# Patient Record
Sex: FEMALE | Race: WHITE | NOT HISPANIC OR LATINO | Employment: UNEMPLOYED | URBAN - METROPOLITAN AREA
[De-identification: names, ages, dates, MRNs, and addresses within clinical notes are randomized per-mention and may not be internally consistent; named-entity substitution may affect disease eponyms.]

---

## 2019-11-12 ENCOUNTER — HOSPITAL ENCOUNTER (OUTPATIENT)
Facility: MEDICAL CENTER | Age: 49
End: 2019-11-12
Attending: FAMILY MEDICINE
Payer: OTHER GOVERNMENT

## 2019-11-12 ENCOUNTER — OFFICE VISIT (OUTPATIENT)
Dept: MEDICAL GROUP | Facility: PHYSICIAN GROUP | Age: 49
End: 2019-11-12
Payer: OTHER GOVERNMENT

## 2019-11-12 VITALS
DIASTOLIC BLOOD PRESSURE: 82 MMHG | OXYGEN SATURATION: 97 % | RESPIRATION RATE: 18 BRPM | HEART RATE: 74 BPM | WEIGHT: 172 LBS | HEIGHT: 65 IN | SYSTOLIC BLOOD PRESSURE: 140 MMHG | TEMPERATURE: 97.8 F | BODY MASS INDEX: 28.66 KG/M2

## 2019-11-12 DIAGNOSIS — Z92.29 HISTORY OF OPIATE THERAPY: ICD-10-CM

## 2019-11-12 DIAGNOSIS — Z13.1 SCREENING FOR DIABETES MELLITUS: ICD-10-CM

## 2019-11-12 DIAGNOSIS — Z12.11 SCREENING FOR COLORECTAL CANCER: ICD-10-CM

## 2019-11-12 DIAGNOSIS — G44.219 EPISODIC TENSION-TYPE HEADACHE, NOT INTRACTABLE: ICD-10-CM

## 2019-11-12 DIAGNOSIS — C43.9 MALIGNANT MELANOMA, UNSPECIFIED SITE (HCC): ICD-10-CM

## 2019-11-12 DIAGNOSIS — Z12.31 ENCOUNTER FOR SCREENING MAMMOGRAM FOR BREAST CANCER: ICD-10-CM

## 2019-11-12 DIAGNOSIS — E78.49 OTHER HYPERLIPIDEMIA: ICD-10-CM

## 2019-11-12 DIAGNOSIS — E04.1 THYROID NODULE: ICD-10-CM

## 2019-11-12 DIAGNOSIS — I10 ESSENTIAL HYPERTENSION: ICD-10-CM

## 2019-11-12 DIAGNOSIS — Z12.12 SCREENING FOR COLORECTAL CANCER: ICD-10-CM

## 2019-11-12 PROBLEM — G44.209 TENSION TYPE HEADACHE: Status: ACTIVE | Noted: 2019-11-12

## 2019-11-12 PROCEDURE — 82274 ASSAY TEST FOR BLOOD FECAL: CPT

## 2019-11-12 PROCEDURE — 99203 OFFICE O/P NEW LOW 30 MIN: CPT | Performed by: FAMILY MEDICINE

## 2019-11-12 RX ORDER — LOSARTAN POTASSIUM 25 MG/1
25 TABLET ORAL DAILY
COMMUNITY
End: 2019-11-12

## 2019-11-12 RX ORDER — EZETIMIBE 10 MG/1
10 TABLET ORAL DAILY
COMMUNITY
End: 2019-11-12

## 2019-11-12 RX ORDER — AMITRIPTYLINE HYDROCHLORIDE 50 MG/1
50 TABLET, FILM COATED ORAL NIGHTLY
COMMUNITY
End: 2019-11-12 | Stop reason: SDUPTHER

## 2019-11-12 RX ORDER — BUTALBITAL, ACETAMINOPHEN AND CAFFEINE 50; 325; 40 MG/1; MG/1; MG/1
1 TABLET ORAL EVERY 4 HOURS PRN
Qty: 30 TAB | Refills: 0 | Status: SHIPPED | OUTPATIENT
Start: 2019-11-12 | End: 2019-12-12

## 2019-11-12 RX ORDER — AMITRIPTYLINE HYDROCHLORIDE 50 MG/1
50 TABLET, FILM COATED ORAL EVERY EVENING
Qty: 90 TAB | Refills: 3 | Status: SHIPPED | OUTPATIENT
Start: 2019-11-12 | End: 2020-12-14 | Stop reason: SDUPTHER

## 2019-11-12 RX ORDER — LOSARTAN POTASSIUM 50 MG/1
50 TABLET ORAL DAILY
Qty: 90 TAB | Refills: 3 | Status: SHIPPED | OUTPATIENT
Start: 2019-11-12 | End: 2020-06-15

## 2019-11-12 RX ORDER — PROPRANOLOL HYDROCHLORIDE 120 MG/1
120 CAPSULE, EXTENDED RELEASE ORAL DAILY
COMMUNITY
End: 2019-11-12 | Stop reason: SDUPTHER

## 2019-11-12 RX ORDER — BUTALBITAL, ACETAMINOPHEN AND CAFFEINE 50; 325; 40 MG/1; MG/1; MG/1
1 TABLET ORAL EVERY 4 HOURS PRN
COMMUNITY
End: 2019-11-12 | Stop reason: SDUPTHER

## 2019-11-12 RX ORDER — PROPRANOLOL HYDROCHLORIDE 120 MG/1
120 CAPSULE, EXTENDED RELEASE ORAL DAILY
Qty: 90 CAP | Refills: 3 | Status: SHIPPED | OUTPATIENT
Start: 2019-11-12 | End: 2020-11-30 | Stop reason: SDUPTHER

## 2019-11-12 RX ORDER — ATORVASTATIN CALCIUM 10 MG/1
10 TABLET, FILM COATED ORAL DAILY
Qty: 90 TAB | Refills: 3 | Status: SHIPPED | OUTPATIENT
Start: 2019-11-12 | End: 2020-06-15

## 2019-11-12 SDOH — HEALTH STABILITY: MENTAL HEALTH: HOW OFTEN DO YOU HAVE A DRINK CONTAINING ALCOHOL?: MONTHLY OR LESS

## 2019-11-12 ASSESSMENT — PATIENT HEALTH QUESTIONNAIRE - PHQ9: CLINICAL INTERPRETATION OF PHQ2 SCORE: 0

## 2019-11-12 NOTE — PROGRESS NOTES
cc: Medication refills      Subjective:     Asher Cortez is a 49 y.o. female presenting for the following:     In April 2018 patient did have a changing skin lesion that was removed and found to be melanoma.  This was on her upper back.  She treated this with excision only and it was recommended that she have a wide excision but she declined this.  She declines any further referrals currently.    Severe tension-type headaches: Chronic recurrent headaches that she was a teenager.  She was previously just on Fioricet as needed but was taking this too regularly.  She was then started on propranolol and amitriptyline at night and this was very helpful.  She now only needs Fioricet a few times a month.  She is gone through about 25 tablets in the last 5 months.     Patient was previously on regular twice daily Norco.  She did stop this medication and did have some withdrawal effects.  Takes kratom semi-regularly, she did start this for the withdrawal effects from opiates.    Patient does have a diagnosis of hyperlipidemia.  She claims her total number was higher than 300 on the last check.  She has been on a statin in the past and does not remember why she was switched to Zetia but she denies having any side effects with simvastatin.    Review of systems:  All others reviewed and are negative.       Current Outpatient Medications:   •  atorvastatin (LIPITOR) 10 MG Tab, Take 1 Tab by mouth every day., Disp: 90 Tab, Rfl: 3  •  amitriptyline (ELAVIL) 50 MG Tab, Take 1 Tab by mouth every evening., Disp: 90 Tab, Rfl: 3  •  losartan (COZAAR) 50 MG Tab, Take 1 Tab by mouth every day., Disp: 90 Tab, Rfl: 3  •  propranolol CR (INDERAL LA) 120 MG CAPSULE SR 24 HR, Take 1 Cap by mouth every day., Disp: 90 Cap, Rfl: 3  •  acetaminophen/caffeine/butalbital 325-40-50 mg (FIORICET) -40 MG Tab, Take 1 Tab by mouth every four hours as needed for Headache for up to 30 days., Disp: 30 Tab, Rfl: 0    Allergies, past medical  "history, past surgical history, family history, social history reviewed and updated    Objective:     Vitals: /82 (BP Location: Left arm, Patient Position: Sitting, BP Cuff Size: Adult)   Pulse 74   Temp 36.6 °C (97.8 °F) (Temporal)   Resp 18   Ht 1.651 m (5' 5\")   Wt 78 kg (172 lb)   SpO2 97%   BMI 28.62 kg/m²   General: Alert, pleasant, NAD  HEENT: Normocephalic.   EOMI, no icterus or pallor.  Conjunctivae and lids normal. External ears normal. Oropharynx non-erythematous, mucous membranes moist.    Neck supple.  No thyromegaly or masses palpated. No cervical or supraclavicular lymphadenopathy.  Heart: Regular rate and rhythm.  S1 and S2 normal.  No murmurs appreciated.  Respiratory: Normal respiratory effort.  Clear to auscultation bilaterally.  Abdomen: Non-distended, soft  Skin: Warm, dry, no rashes.  Well-healed scar roughly 2 inches long on back.  Musculoskeletal: Gait is normal.  Moves all extremities well.  Extremities: No leg edema.    Neurological: No tremors, sensation grossly intact,  tone/strength normal, gait is normal, CN2-12 grossly intact  Psych:  Affect is normal, judgement is good, memory is intact, grooming is appropriate.    Assessment/Plan:     Asher was seen today for establish care and medication refill.    Diagnoses and all orders for this visit:    Malignant melanoma, unspecified site (HCC): Per patient report a wide excision and lymph node biopsy was recommended to her in April 2019 but she declined this and she declines any referral currently.    Episodic tension-type headache, not intractable: Patient taking regular amitriptyline and propranolol for prophylaxis and Fioricet only as needed.  Averaging 4-5 tablets/month.  She is aware that this is an addictive drug and not to take this with alcohol.  -     acetaminophen/caffeine/butalbital 325-40-50 mg (FIORICET) -40 MG Tab; Take 1 Tab by mouth every four hours as needed for Headache for up to 30 days.    Other " hyperlipidemia: Very elevated in the past.  Will stop Zetia and restart a statin, as patient denied having any side effects with them in the past.  -     Lipid Profile; Future    Essential hypertension well-controlled chronic problem with losartan.  -     Comp Metabolic Panel; Future    Screening for diabetes mellitus  -     HEMOGLOBIN A1C; Future    Encounter for screening mammogram for breast cancer: Denies breast pain or masses, breast skin changes, or nipple discharge.   -     MA-SCREENING MAMMO BILAT W/TOMOSYNTHESIS W/CAD; Future    Screening for colorectal cancer  -     OCCULT BLOOD FECES IMMUNOASSAY (FIT); Future    History of opiate therapy: Previously on regular Norco did stop this.    Thyroid nodule: Per patient report she does have a thyroid nodule found incidentally on MRI.  This has not been monitored in more than a year.  Will check now.  -     US-SOFT TISSUES OF HEAD - NECK; Future    Other orders  -     atorvastatin (LIPITOR) 10 MG Tab; Take 1 Tab by mouth every day.  -     amitriptyline (ELAVIL) 50 MG Tab; Take 1 Tab by mouth every evening.  -     losartan (COZAAR) 50 MG Tab; Take 1 Tab by mouth every day.  -     propranolol CR (INDERAL LA) 120 MG CAPSULE SR 24 HR; Take 1 Cap by mouth every day.        Return in about 6 months (around 5/12/2020), or if symptoms worsen or fail to improve.

## 2019-11-14 DIAGNOSIS — Z12.12 SCREENING FOR COLORECTAL CANCER: ICD-10-CM

## 2019-11-14 DIAGNOSIS — Z12.11 SCREENING FOR COLORECTAL CANCER: ICD-10-CM

## 2019-11-14 LAB — HEMOCCULT STL QL IA: NEGATIVE

## 2019-12-10 ENCOUNTER — HOSPITAL ENCOUNTER (OUTPATIENT)
Dept: RADIOLOGY | Facility: MEDICAL CENTER | Age: 49
End: 2019-12-10
Attending: FAMILY MEDICINE
Payer: OTHER GOVERNMENT

## 2019-12-10 DIAGNOSIS — E04.1 THYROID NODULE: ICD-10-CM

## 2019-12-10 DIAGNOSIS — Z12.31 ENCOUNTER FOR SCREENING MAMMOGRAM FOR BREAST CANCER: ICD-10-CM

## 2019-12-10 PROCEDURE — 76536 US EXAM OF HEAD AND NECK: CPT

## 2019-12-10 PROCEDURE — 77067 SCR MAMMO BI INCL CAD: CPT

## 2019-12-12 ENCOUNTER — TELEPHONE (OUTPATIENT)
Dept: RADIOLOGY | Facility: MEDICAL CENTER | Age: 49
End: 2019-12-12

## 2020-06-10 ENCOUNTER — HOSPITAL ENCOUNTER (OUTPATIENT)
Dept: LAB | Facility: MEDICAL CENTER | Age: 50
End: 2020-06-10
Attending: FAMILY MEDICINE
Payer: OTHER GOVERNMENT

## 2020-06-10 DIAGNOSIS — I10 ESSENTIAL HYPERTENSION: ICD-10-CM

## 2020-06-10 DIAGNOSIS — E78.49 OTHER HYPERLIPIDEMIA: ICD-10-CM

## 2020-06-10 DIAGNOSIS — Z13.1 SCREENING FOR DIABETES MELLITUS: ICD-10-CM

## 2020-06-10 PROCEDURE — 80053 COMPREHEN METABOLIC PANEL: CPT

## 2020-06-10 PROCEDURE — 83036 HEMOGLOBIN GLYCOSYLATED A1C: CPT

## 2020-06-10 PROCEDURE — 80061 LIPID PANEL: CPT

## 2020-06-10 PROCEDURE — 36415 COLL VENOUS BLD VENIPUNCTURE: CPT

## 2020-06-11 LAB
ALBUMIN SERPL BCP-MCNC: 4.4 G/DL (ref 3.2–4.9)
ALBUMIN/GLOB SERPL: 1.6 G/DL
ALP SERPL-CCNC: 119 U/L (ref 30–99)
ALT SERPL-CCNC: 12 U/L (ref 2–50)
ANION GAP SERPL CALC-SCNC: 8 MMOL/L (ref 7–16)
AST SERPL-CCNC: 16 U/L (ref 12–45)
BILIRUB SERPL-MCNC: 0.3 MG/DL (ref 0.1–1.5)
BUN SERPL-MCNC: 12 MG/DL (ref 8–22)
CALCIUM SERPL-MCNC: 9.3 MG/DL (ref 8.5–10.5)
CHLORIDE SERPL-SCNC: 106 MMOL/L (ref 96–112)
CHOLEST SERPL-MCNC: 384 MG/DL (ref 100–199)
CO2 SERPL-SCNC: 23 MMOL/L (ref 20–33)
CREAT SERPL-MCNC: 0.81 MG/DL (ref 0.5–1.4)
EST. AVERAGE GLUCOSE BLD GHB EST-MCNC: 114 MG/DL
GLOBULIN SER CALC-MCNC: 2.7 G/DL (ref 1.9–3.5)
GLUCOSE SERPL-MCNC: 76 MG/DL (ref 65–99)
HBA1C MFR BLD: 5.6 % (ref 0–5.6)
HDLC SERPL-MCNC: 58 MG/DL
LDLC SERPL CALC-MCNC: 298 MG/DL
POTASSIUM SERPL-SCNC: 4.6 MMOL/L (ref 3.6–5.5)
PROT SERPL-MCNC: 7.1 G/DL (ref 6–8.2)
SODIUM SERPL-SCNC: 137 MMOL/L (ref 135–145)
TRIGL SERPL-MCNC: 140 MG/DL (ref 0–149)

## 2020-06-15 ENCOUNTER — OFFICE VISIT (OUTPATIENT)
Dept: MEDICAL GROUP | Facility: PHYSICIAN GROUP | Age: 50
End: 2020-06-15
Payer: OTHER GOVERNMENT

## 2020-06-15 VITALS
HEART RATE: 64 BPM | BODY MASS INDEX: 28.66 KG/M2 | OXYGEN SATURATION: 94 % | WEIGHT: 172 LBS | DIASTOLIC BLOOD PRESSURE: 92 MMHG | HEIGHT: 65 IN | TEMPERATURE: 97.5 F | SYSTOLIC BLOOD PRESSURE: 146 MMHG

## 2020-06-15 DIAGNOSIS — E78.01 FAMILIAL HYPERCHOLESTEREMIA: ICD-10-CM

## 2020-06-15 DIAGNOSIS — N95.1 MENOPAUSAL AND FEMALE CLIMACTERIC STATES: ICD-10-CM

## 2020-06-15 DIAGNOSIS — F41.9 ANXIETY: ICD-10-CM

## 2020-06-15 DIAGNOSIS — G44.219 EPISODIC TENSION-TYPE HEADACHE, NOT INTRACTABLE: ICD-10-CM

## 2020-06-15 DIAGNOSIS — I10 ESSENTIAL HYPERTENSION: ICD-10-CM

## 2020-06-15 PROCEDURE — 99214 OFFICE O/P EST MOD 30 MIN: CPT | Performed by: FAMILY MEDICINE

## 2020-06-15 RX ORDER — ATORVASTATIN CALCIUM 40 MG/1
40 TABLET, FILM COATED ORAL DAILY
Qty: 90 TAB | Refills: 1 | Status: SHIPPED | OUTPATIENT
Start: 2020-06-15 | End: 2021-03-02

## 2020-06-15 RX ORDER — PROPRANOLOL HCL 60 MG
60 CAPSULE, EXTENDED RELEASE 24HR ORAL EVERY EVENING
Qty: 90 CAP | Refills: 2 | Status: SHIPPED | OUTPATIENT
Start: 2020-06-15 | End: 2020-12-14 | Stop reason: SDUPTHER

## 2020-06-15 RX ORDER — BUTALBITAL, ACETAMINOPHEN AND CAFFEINE 50; 325; 40 MG/1; MG/1; MG/1
1 TABLET ORAL EVERY 4 HOURS PRN
Qty: 30 TAB | Refills: 0 | Status: SHIPPED | OUTPATIENT
Start: 2020-06-15 | End: 2021-05-07 | Stop reason: SDUPTHER

## 2020-06-15 RX ORDER — LOSARTAN POTASSIUM 25 MG/1
25 TABLET ORAL DAILY
Qty: 90 TAB | Refills: 3 | Status: SHIPPED | OUTPATIENT
Start: 2020-06-15 | End: 2020-12-14

## 2020-06-15 RX ORDER — ESTRADIOL 0.1 MG/G
CREAM VAGINAL
Qty: 1 TUBE | Refills: 2 | Status: SHIPPED | OUTPATIENT
Start: 2020-06-15 | End: 2021-05-24

## 2020-06-15 ASSESSMENT — PATIENT HEALTH QUESTIONNAIRE - PHQ9
CLINICAL INTERPRETATION OF PHQ2 SCORE: 5
SUM OF ALL RESPONSES TO PHQ QUESTIONS 1-9: 14
5. POOR APPETITE OR OVEREATING: 3 - NEARLY EVERY DAY

## 2020-06-15 NOTE — PROGRESS NOTES
cc: anxiety       Subjective:     Asher Galeas is a 49 y.o. female presenting for the following:     Anxiety: patient has now been taking propranolol in the morning and this has helped her anxiety quite a bit. Also, she has stopped getting on social media and this has helped dramatically.  She does have some worry but she is now sleeping well and is able to concentrate make decisions.    Hypertension: Patient ran out of the losartan and admits she was not very good at taking it regularly.  However, she did not have side effect when she was taking this.Patient denies any chest pain, shortness of breath, palpitations, swelling, or lightheadedness.    Hyperlipidemia- patient did indeed have elevated cholesterol with an LDL of 298.  She did take her atorvastatin 10 mg without side effect.  She believes she has been on a higher dose of this in the past without side effect.  She does have a brother who has had a bypass graft in his 50s and her father did have a heart attack at a young age.    Review of systems:  All others reviewed and are negative.       Current Outpatient Medications:   •  propranolol LA (INDERAL LA) 60 MG CAPSULE SR 24 HR, Take 1 Cap by mouth every evening., Disp: 90 Cap, Rfl: 2  •  losartan (COZAAR) 25 MG Tab, Take 1 Tab by mouth every day., Disp: 90 Tab, Rfl: 3  •  atorvastatin (LIPITOR) 40 MG Tab, Take 1 Tab by mouth every day., Disp: 90 Tab, Rfl: 1  •  acetaminophen/caffeine/butalbital 325-40-50 mg (FIORICET) -40 MG Tab, Take 1 Tab by mouth every four hours as needed for Headache for up to 30 days., Disp: 30 Tab, Rfl: 0  •  estradiol (ESTRACE) 0.1 MG/GM vaginal cream, 1 g VAGINALLY daily for 2 weeks, then use 1 g 3 times a week for 3 months, Disp: 1 Tube, Rfl: 2  •  aspirin 81 MG tablet, Take 81 mg by mouth every day., Disp: , Rfl:   •  amitriptyline (ELAVIL) 50 MG Tab, Take 1 Tab by mouth every evening., Disp: 90 Tab, Rfl: 3  •  propranolol CR (INDERAL LA) 120 MG CAPSULE  "SR 24 HR, Take 1 Cap by mouth every day., Disp: 90 Cap, Rfl: 3    Allergies, past medical history, past surgical history, family history, social history reviewed and updated    Objective:     Vitals: /92 (BP Location: Left arm, Patient Position: Sitting, BP Cuff Size: Large adult)   Pulse 64   Temp 36.4 °C (97.5 °F)   Ht 1.651 m (5' 5\")   Wt 78 kg (172 lb)   SpO2 94%   BMI 28.62 kg/m²   General: Alert, pleasant, NAD  HEENT: Normocephalic.   EOMI, no icterus or pallor.    Heart: Regular rate and rhythm.  S1 and S2 normal.  No murmurs appreciated.  Respiratory: Normal respiratory effort.  Clear to auscultation bilaterally.   Neurological: sensation grossly intact,  tone/strength normal, gait is normal, CN2-12 grossly intact  Psych:  Affect is normal, judgement is good, grooming is appropriate.    Assessment/Plan:     Asher was seen today for menopause, anxiety, hypertension and migraine.    Diagnoses and all orders for this visit:    Patient feels that her anxiety is much better with propranolol but blood pressure is not well controlled today.  Will add a 60 mg propranolol in the evening and restart losartan at 25 mg.  Patient warned of common side effects of these.  Essential hypertension  -     propranolol LA (INDERAL LA) 60 MG CAPSULE SR 24 HR; Take 1 Cap by mouth every evening.  -     losartan (COZAAR) 25 MG Tab; Take 1 Tab by mouth every day.  Anxiety  -     propranolol LA (INDERAL LA) 60 MG CAPSULE SR 24 HR; Take 1 Cap by mouth every evening.    Episodic tension-type headache, not intractable: Patient uses about 30 tablets/year of Fioricet.  This is very helpful to abort her severe tension headache.  She knows to not take this with alcohol or take more than is prescribed.  She has not had sedation or severe side effect with this in the past.  -     acetaminophen/caffeine/butalbital 325-40-50 mg (FIORICET) -40 MG Tab; Take 1 Tab by mouth every four hours as needed for Headache for up to 30 " days.    Menopausal and female climacteric states: Patient has had vaginal dryness since menopause.  No history of estrogen sensitive cancers.  Will trial topical estrogen cream.  Patient warned of risk benefits of this.  -     estradiol (ESTRACE) 0.1 MG/GM vaginal cream; 1 g VAGINALLY daily for 2 weeks, then use 1 g 3 times a week for 3 months    Familial hypercholesteremia: We will increase atorvastatin from 10 mg to 40 mg.  Patient has taken a baby aspirin daily in the past without side effect so I would recommend she restart this.  Patient declines referral to lipid clinic now.  Will increase statin and recheck cholesterol least yearly.  -     atorvastatin (LIPITOR) 40 MG Tab; Take 1 Tab by mouth every day.      Return in about 4 weeks (around 7/13/2020), or if symptoms worsen or fail to improve, for Pap.

## 2020-06-24 ENCOUNTER — OFFICE VISIT (OUTPATIENT)
Dept: MEDICAL GROUP | Facility: PHYSICIAN GROUP | Age: 50
End: 2020-06-24
Payer: OTHER GOVERNMENT

## 2020-06-24 ENCOUNTER — HOSPITAL ENCOUNTER (OUTPATIENT)
Facility: MEDICAL CENTER | Age: 50
End: 2020-06-24
Attending: FAMILY MEDICINE
Payer: OTHER GOVERNMENT

## 2020-06-24 VITALS
HEIGHT: 66 IN | HEART RATE: 77 BPM | OXYGEN SATURATION: 94 % | SYSTOLIC BLOOD PRESSURE: 132 MMHG | TEMPERATURE: 99.9 F | WEIGHT: 175.3 LBS | BODY MASS INDEX: 28.17 KG/M2 | DIASTOLIC BLOOD PRESSURE: 80 MMHG

## 2020-06-24 DIAGNOSIS — Z12.4 SCREENING FOR MALIGNANT NEOPLASM OF CERVIX: ICD-10-CM

## 2020-06-24 DIAGNOSIS — Z01.419 WELL WOMAN EXAM WITH ROUTINE GYNECOLOGICAL EXAM: ICD-10-CM

## 2020-06-24 DIAGNOSIS — N88.8 MASS OF CERVIX: ICD-10-CM

## 2020-06-24 DIAGNOSIS — E78.01 FAMILIAL HYPERCHOLESTEREMIA: ICD-10-CM

## 2020-06-24 DIAGNOSIS — Z11.51 SCREENING FOR HPV (HUMAN PAPILLOMAVIRUS): ICD-10-CM

## 2020-06-24 PROCEDURE — 99396 PREV VISIT EST AGE 40-64: CPT | Performed by: FAMILY MEDICINE

## 2020-06-24 PROCEDURE — 87624 HPV HI-RISK TYP POOLED RSLT: CPT

## 2020-06-24 PROCEDURE — 88175 CYTOPATH C/V AUTO FLUID REDO: CPT

## 2020-06-24 NOTE — PROGRESS NOTES
S:  Asher Cortez is a 49 y.o.,femalewho presents today for her Pap and GYN exam.  Her LMP was 6 weeks ago.  She is having irregular menses. This has started since 48.  Her form of contraception is  with vasectomy.    She is using estradiol vaginal cream. It is working for the vaginal dryness.     She has had an Abnormal Pap previously. About 5 years ago, and did a colposcopy. She was returned to normal screening.     Her last Mammogram was done: 12/2019    GYN ROS:  normal menses, no abnormal bleeding, pelvic pain or discharge, no breast pain or new or enlarging lumps on self exam    Patient Active Problem List    Diagnosis Date Noted   • Anxiety 06/15/2020   • Menopausal and female climacteric states 06/15/2020   • Melanoma (HCC) 11/12/2019   • Tension type headache 11/12/2019   • Familial hypercholesteremia 11/12/2019   • Essential hypertension 11/12/2019   • History of opiate therapy 11/12/2019   • Thyroid nodule 11/12/2019     Current Outpatient Medications on File Prior to Visit   Medication Sig Dispense Refill   • propranolol LA (INDERAL LA) 60 MG CAPSULE SR 24 HR Take 1 Cap by mouth every evening. 90 Cap 2   • losartan (COZAAR) 25 MG Tab Take 1 Tab by mouth every day. 90 Tab 3   • atorvastatin (LIPITOR) 40 MG Tab Take 1 Tab by mouth every day. 90 Tab 1   • acetaminophen/caffeine/butalbital 325-40-50 mg (FIORICET) -40 MG Tab Take 1 Tab by mouth every four hours as needed for Headache for up to 30 days. 30 Tab 0   • estradiol (ESTRACE) 0.1 MG/GM vaginal cream 1 g VAGINALLY daily for 2 weeks, then use 1 g 3 times a week for 3 months 1 Tube 2   • amitriptyline (ELAVIL) 50 MG Tab Take 1 Tab by mouth every evening. 90 Tab 3   • propranolol CR (INDERAL LA) 120 MG CAPSULE SR 24 HR Take 1 Cap by mouth every day. 90 Cap 3   • aspirin 81 MG tablet Take 81 mg by mouth every day.       No current facility-administered medications on file prior to visit.      Social History     Tobacco Use  "  • Smoking status: Former Smoker     Packs/day: 0.00   • Smokeless tobacco: Never Used   Substance Use Topics   • Alcohol use: Yes     Frequency: Monthly or less       O: /80 (BP Location: Right arm, Patient Position: Sitting, BP Cuff Size: Adult)   Pulse 77   Temp 37.7 °C (99.9 °F) (Temporal)   Ht 1.676 m (5' 6\")   Wt 79.5 kg (175 lb 4.8 oz)   SpO2 94%   BMI 28.29 kg/m²   Vitals Noted and Reviewed  Lungs: normal to auscultation  Heart: regular rate and rhythm  Vulva: grossly unremarkable, no lesions or masses noted  Vagina: no abnormal discharge, normal color  Cervix: nonparous, nonfriable, smooth, ~2mm mass at cervical os. , Pap was performed  Uterus: Normal shape, position and consistency  Bimanual exam: No uteromegaly, negative chandelier sign, adnexa freely movable and without enlargements bilaterally  Rectal: not performed    Assessment/Plan:   Pap processed and sent to the lab.  Small, smooth mass from cervix, likely benign polyp but will refer to gynecology for further evaluation.    -patient is taking atorvastatin daily now. Denies side effect. BP well controlled today.     Recommend follow up 6 months prn      "

## 2020-06-25 LAB
CYTOLOGY REG CYTOL: NORMAL
HPV HR 12 DNA CVX QL NAA+PROBE: NEGATIVE
HPV16 DNA SPEC QL NAA+PROBE: NEGATIVE
HPV18 DNA SPEC QL NAA+PROBE: NEGATIVE
SPECIMEN SOURCE: NORMAL

## 2020-11-30 RX ORDER — PROPRANOLOL HYDROCHLORIDE 120 MG/1
120 CAPSULE, EXTENDED RELEASE ORAL DAILY
Qty: 90 CAP | Refills: 0 | Status: SHIPPED | OUTPATIENT
Start: 2020-11-30 | End: 2020-12-14 | Stop reason: SDUPTHER

## 2020-12-14 ENCOUNTER — OFFICE VISIT (OUTPATIENT)
Dept: MEDICAL GROUP | Facility: PHYSICIAN GROUP | Age: 50
End: 2020-12-14
Payer: OTHER GOVERNMENT

## 2020-12-14 VITALS
TEMPERATURE: 98.6 F | DIASTOLIC BLOOD PRESSURE: 98 MMHG | OXYGEN SATURATION: 97 % | SYSTOLIC BLOOD PRESSURE: 168 MMHG | WEIGHT: 178 LBS | BODY MASS INDEX: 28.61 KG/M2 | HEART RATE: 86 BPM | HEIGHT: 66 IN

## 2020-12-14 DIAGNOSIS — Z12.12 SCREENING FOR COLORECTAL CANCER: ICD-10-CM

## 2020-12-14 DIAGNOSIS — Z12.31 ENCOUNTER FOR SCREENING MAMMOGRAM FOR MALIGNANT NEOPLASM OF BREAST: ICD-10-CM

## 2020-12-14 DIAGNOSIS — F32.A ANXIETY AND DEPRESSION: ICD-10-CM

## 2020-12-14 DIAGNOSIS — F41.9 ANXIETY AND DEPRESSION: ICD-10-CM

## 2020-12-14 DIAGNOSIS — C43.59 MALIGNANT MELANOMA OF TORSO EXCLUDING BREAST (HCC): ICD-10-CM

## 2020-12-14 DIAGNOSIS — G44.219 EPISODIC TENSION-TYPE HEADACHE, NOT INTRACTABLE: ICD-10-CM

## 2020-12-14 DIAGNOSIS — E04.1 THYROID NODULE: ICD-10-CM

## 2020-12-14 DIAGNOSIS — Z12.11 SCREEN FOR COLON CANCER: ICD-10-CM

## 2020-12-14 DIAGNOSIS — I10 ESSENTIAL HYPERTENSION: ICD-10-CM

## 2020-12-14 DIAGNOSIS — E55.9 VITAMIN D DEFICIENCY: ICD-10-CM

## 2020-12-14 DIAGNOSIS — E78.01 FAMILIAL HYPERCHOLESTEREMIA: ICD-10-CM

## 2020-12-14 DIAGNOSIS — Z12.11 SCREENING FOR COLORECTAL CANCER: ICD-10-CM

## 2020-12-14 DIAGNOSIS — Z13.21 ENCOUNTER FOR VITAMIN DEFICIENCY SCREENING: ICD-10-CM

## 2020-12-14 DIAGNOSIS — F41.9 ANXIETY: ICD-10-CM

## 2020-12-14 PROCEDURE — 99214 OFFICE O/P EST MOD 30 MIN: CPT | Performed by: NURSE PRACTITIONER

## 2020-12-14 RX ORDER — AMITRIPTYLINE HYDROCHLORIDE 50 MG/1
50 TABLET, FILM COATED ORAL EVERY EVENING
Qty: 90 TAB | Refills: 3 | Status: SHIPPED | OUTPATIENT
Start: 2020-12-14 | End: 2022-02-02 | Stop reason: SDUPTHER

## 2020-12-14 RX ORDER — PROPRANOLOL HYDROCHLORIDE 120 MG/1
120 CAPSULE, EXTENDED RELEASE ORAL DAILY
Qty: 90 CAP | Refills: 0 | Status: SHIPPED | OUTPATIENT
Start: 2020-12-14 | End: 2021-03-12 | Stop reason: SDUPTHER

## 2020-12-14 RX ORDER — PROPRANOLOL HCL 60 MG
60 CAPSULE, EXTENDED RELEASE 24HR ORAL EVERY EVENING
Qty: 90 CAP | Refills: 2 | Status: SHIPPED | OUTPATIENT
Start: 2020-12-14 | End: 2021-10-08 | Stop reason: SDUPTHER

## 2020-12-14 RX ORDER — LOSARTAN POTASSIUM 50 MG/1
50 TABLET ORAL DAILY
Qty: 90 TAB | Refills: 3 | Status: SHIPPED | OUTPATIENT
Start: 2020-12-14 | End: 2020-12-28

## 2020-12-15 NOTE — PROGRESS NOTES
Chief Complaint   Patient presents with   • Medication Refill     bp meds          Subjective:     Asher Cortez is a 50 y.o. female presenting to establish care.    Melanoma:  Pt reports diagnosis of melanoma in 2019 with local excision done in [place], declined testing lymph at the time, needs to establish with local provider.      Tension headaches / anxiety: tried to taper off amitriptyline but experienced significant rebound anxiety and hypertension. Also takes propranolol routinely. On Kraton.    Hypertension: chronic, uncontrolled. On propranolol but has been out.  Hoping her BP will regulate with amitriptyline and beta blocker on board.  Denies chest pain / HA / swelling.     Hypercholesteremia: persistently elevated lipids; reports significant family history with difficulty controlling regardless of diet and treatment. Is interested in new alternative agents if these are appropriate.    Due for screening colonoscopy and routine labs.         Review of systems:      Denies chest pain, shortness of breath, sore throat, difficulty swallowing, new cough, dizziness, severe headache, altered cognition, changes in bowel or bladder habits,fatigue, myalgias, painful or swollen lymph nodes.       Current Outpatient Medications:   •  losartan (COZAAR) 50 MG Tab, Take 1 Tab by mouth every day., Disp: 90 Tab, Rfl: 3  •  propranolol CR (INDERAL LA) 120 MG CAPSULE SR 24 HR, Take 1 Cap by mouth every day., Disp: 90 Cap, Rfl: 0  •  propranolol LA (INDERAL LA) 60 MG CAPSULE SR 24 HR, Take 1 Cap by mouth every evening., Disp: 90 Cap, Rfl: 2  •  amitriptyline (ELAVIL) 50 MG Tab, Take 1 Tab by mouth every evening., Disp: 90 Tab, Rfl: 3  •  atorvastatin (LIPITOR) 40 MG Tab, Take 1 Tab by mouth every day., Disp: 90 Tab, Rfl: 1  •  estradiol (ESTRACE) 0.1 MG/GM vaginal cream, 1 g VAGINALLY daily for 2 weeks, then use 1 g 3 times a week for 3 months, Disp: 1 Tube, Rfl: 2  •  aspirin 81 MG tablet, Take 81 mg by  "mouth every day., Disp: , Rfl:     Allergies, past medical history, past surgical history, family history, social history reviewed and updated    Objective:     Vitals: BP (!) 168/98 (BP Location: Right arm, Patient Position: Sitting, BP Cuff Size: Adult)   Pulse 86   Temp 37 °C (98.6 °F) (Temporal)   Ht 1.676 m (5' 6\")   Wt 80.7 kg (178 lb)   SpO2 97%   BMI 28.73 kg/m²   General: Alert, cooperative, dressed appropriately for weather / situation  Eyes:Normocephalic.  EOMI, no icterus or pallor.  Conjunctivae clear without erythema / irritation.  ENT:  External ears developed; Bilat TMs visualized; appear pearly without bulging, effusion, or erythema; crisp light reflex    Lymph: Neck supple, absent of cervical or supraclavicular lymphadenopathy.  Thyroid palpated, free of masses or goiter.   Heart: Regular rate and rhythm.  S1 and S2 normal.  No murmurs auscultated; no murmurs / bruits heard over bilateral carotids.  Bilateral radial pulses strong and equal.  Bilateral posterior tibial pulses strong and equal.  Respiratory: Normal respiratory effort.  Clear to auscultation bilaterally.  AP ratio 1:2   Abdomen: Non-distended;   Skin: Visible skin intact, dry, without rash.  Musculoskeletal: Gait is normal.  Bilateral  strength strong equal.  Moves extremities freely and equally bilaterally  Neuro:  AAOx3 Visual tracking intact, no nystagmus;   Psych:  Affect/mood is normal, judgement is good, memory is intact, grooming is appropriate.    Assessment/Plan:     Asher was seen today for medication refill.    Diagnoses and all orders for this visit:    Malignant melanoma of torso excluding breast (HCC)  Comments:  Reviewed importance of close follow up and benefit of additional workup if indicated; referral to derm placed.  Orders:  -     CBC WITH DIFFERENTIAL; Future  -     Comp Metabolic Panel; Future  -     REFERRAL TO DERMATOLOGY    Essential hypertension  Comments:  Restart meds and return for BP check; " may need additional medications for optimal control  Orders:  -     TSH WITH REFLEX TO FT4; Future  -     losartan (COZAAR) 50 MG Tab; Take 1 Tab by mouth every day.  -     propranolol CR (INDERAL LA) 120 MG CAPSULE SR 24 HR; Take 1 Cap by mouth every day.  -     propranolol LA (INDERAL LA) 60 MG CAPSULE SR 24 HR; Take 1 Cap by mouth every evening.    Familial hypercholesteremia  Comments:  Discussed benefit of new agents with statin resistant dyslipidemia.  Pt very interested in discussing these with pharmacist.   Orders:  -     REFERRAL TO PHARMACOTHERAPY SERVICE    Screening for colorectal cancer  -     REFERRAL TO GI FOR COLONOSCOPY    Screen for colon cancer  -     REFERRAL TO GI FOR COLONOSCOPY    Encounter for screening mammogram for malignant neoplasm of breast  -     MA-SCREENING MAMMO BILAT W/TOMOSYNTHESIS W/CAD; Future    Thyroid nodule  -     TSH WITH REFLEX TO FT4; Future    Vitamin D deficiency  -     VITAMIN D,25 HYDROXY; Future    Encounter for vitamin deficiency screening  -     VITAMIN D,25 HYDROXY; Future  -     CBC WITH DIFFERENTIAL; Future  -     Comp Metabolic Panel; Future    Anxiety  -     propranolol LA (INDERAL LA) 60 MG CAPSULE SR 24 HR; Take 1 Cap by mouth every evening.    Episodic tension-type headache, not intractable  -     amitriptyline (ELAVIL) 50 MG Tab; Take 1 Tab by mouth every evening.    Anxiety and depression  -     amitriptyline (ELAVIL) 50 MG Tab; Take 1 Tab by mouth every evening.          Return in about 6 months (around 6/14/2021).    Patient verbalized understanding and agreed to plan of care.  Encouraged to contact me with needs via [employer] or by phone if needed.      I have placed the above orders and discussed them with an approved delegating provider.  The MA is performing the below orders under the direction of Dr Javed.    Please note that this dictation was created using voice recognition software. I have made every reasonable attempt to correct obvious errors,  but I expect that there are errors of grammar and possibly content that I did not discover before finalizing the note.

## 2020-12-15 NOTE — PATIENT INSTRUCTIONS
Drink 2L of water a day    Take 1-2 doses of over the counter miralax for 2 weeks until bowel movements are soft / formed; then slowly decrease

## 2020-12-16 DIAGNOSIS — E78.01 FAMILIAL HYPERCHOLESTEREMIA: ICD-10-CM

## 2020-12-17 NOTE — PROGRESS NOTES
Patient is requesting vascular medicine consultation rather than seeing a pharmacist.  Will arrange.    Michael J Bloch, MD  Certified Clinical Lipid Specialist  Medial Director, [facility]    Cc: TARIQ Shipman

## 2020-12-18 ENCOUNTER — TELEPHONE (OUTPATIENT)
Dept: VASCULAR LAB | Facility: MEDICAL CENTER | Age: 50
End: 2020-12-18

## 2020-12-21 ENCOUNTER — NON-PROVIDER VISIT (OUTPATIENT)
Dept: MEDICAL GROUP | Facility: PHYSICIAN GROUP | Age: 50
End: 2020-12-21
Payer: OTHER GOVERNMENT

## 2020-12-21 VITALS — DIASTOLIC BLOOD PRESSURE: 100 MMHG | SYSTOLIC BLOOD PRESSURE: 146 MMHG

## 2020-12-21 NOTE — PROGRESS NOTES
Asher Hohleen Dana is a 50 y.o. female here for a non-provider visit for Blood pressure check    If abnormal was an in office provider notified today (if so, indicate provider)? Yes  Routed to PCP? Yes

## 2020-12-28 DIAGNOSIS — I10 ESSENTIAL HYPERTENSION: ICD-10-CM

## 2020-12-28 RX ORDER — LOSARTAN POTASSIUM 100 MG/1
100 TABLET ORAL DAILY
Qty: 90 TAB | Refills: 3 | Status: SHIPPED | OUTPATIENT
Start: 2020-12-28 | End: 2022-01-27 | Stop reason: SDUPTHER

## 2021-03-02 ENCOUNTER — OFFICE VISIT (OUTPATIENT)
Dept: VASCULAR LAB | Facility: MEDICAL CENTER | Age: 51
End: 2021-03-02
Attending: INTERNAL MEDICINE
Payer: OTHER GOVERNMENT

## 2021-03-02 VITALS
WEIGHT: 180 LBS | DIASTOLIC BLOOD PRESSURE: 81 MMHG | HEART RATE: 76 BPM | BODY MASS INDEX: 28.93 KG/M2 | SYSTOLIC BLOOD PRESSURE: 124 MMHG | HEIGHT: 66 IN

## 2021-03-02 DIAGNOSIS — I10 ESSENTIAL HYPERTENSION: ICD-10-CM

## 2021-03-02 DIAGNOSIS — E78.01 FAMILIAL HYPERCHOLESTEREMIA: ICD-10-CM

## 2021-03-02 PROCEDURE — 99204 OFFICE O/P NEW MOD 45 MIN: CPT | Performed by: INTERNAL MEDICINE

## 2021-03-02 PROCEDURE — 99212 OFFICE O/P EST SF 10 MIN: CPT

## 2021-03-02 RX ORDER — ROSUVASTATIN CALCIUM 40 MG/1
40 TABLET, COATED ORAL DAILY
Qty: 30 TABLET | Refills: 11 | Status: SHIPPED | OUTPATIENT
Start: 2021-03-02 | End: 2022-09-30 | Stop reason: SDUPTHER

## 2021-03-02 NOTE — PROGRESS NOTES
[facility] - [facility] Visit  Date of Service: 03/02/21    Asher Galeas has been referred for evaluation and management of dyslipidemia    Referral Source: PCP    HPI  History of ASCVD: No  Other Established (non-atherosclerotic) Vascular Disease, if Present: None  Age at Initial Diagnosis of Dyslipidemia: early 30s    Current Prescription Lipid Lowering Medications - including dose:   Statin: atorva 40 - not taking it  Non-Statin: None  Current Lipid Lowering and Related Supplements:   none  Any Current Side Effects Potentially Related to Lipid Lowering therapy?   No  Current Adherence to Lipid Lowering Therapies   Completely non-adherent  Previously Attempted Interventions for Lipids - including outcome  Statin: None    Outcome: N/A  Non-Statin: ezetimibe   Outcome: tolerated well  Any Previous History of Statin Intolerance?   No  Baseline Lipids Prior to Treatment:   Shown here:  LDL-C: 298  HDL-C: 58  Trigs: 140 Date: June 2020  Other Pertinent History:   Doesn't feel that atorvastatin has helped her cholesterol  No known renal disease or endocrine issues  History of other CV risk factors:   HTN - Long h/o high bp - currently on inderal and losartan. Not checking bp at home  No problem with sugars    PAST MEDICAL HISTORY:  has a past medical history of Cancer (HCC), Head ache, Hyperlipidemia, Hypertension, and Migraine.    PAST SURGICAL HISTORY:  has a past surgical history that includes tonsillectomy.    CURRENT MEDICATIONS:   Current Outpatient Medications:   •  losartan, 100 mg, Oral, DAILY, Taking  •  propranolol CR, 120 mg, Oral, DAILY, Taking  •  propranolol LA, 60 mg, Oral, Q EVENING, Taking  •  amitriptyline, 50 mg, Oral, Q EVENING, Taking  •  atorvastatin, 40 mg, Oral, DAILY, Taking  •  estradiol, 1 g VAGINALLY daily for 2 weeks, then use 1 g 3 times a week for 3 months, Taking  •  aspirin, 81 mg, Oral, DAILY    ALLERGIES: Patient has no known allergies.    FAMILY HISTORY:    Mom - cholesterol over 300  2 brothers - both with MI in 40s    SOCIAL HISTORY   Social History     Tobacco Use   Smoking Status Former Smoker   • Packs/day: 0.00   Smokeless Tobacco Never Used   Tobacco Comment    former light smoker; quit when brother had bypass     Change in weight: Stable  Exercise habits: moderate regular exercise program  Diet: relatively heart health    Physical Exam   Constitutional: She is oriented to person, place, and time. No distress.   HENT:   Head: Normocephalic and atraumatic.   Eyes: Conjunctivae and EOM are normal. No scleral icterus.   Neck:   No carotid bruits   Cardiovascular: Normal rate, regular rhythm, normal heart sounds and intact distal pulses.   No murmur heard.  Pulmonary/Chest: Effort normal and breath sounds normal. No respiratory distress. She has no wheezes. She has no rales.   Abdominal: Soft. She exhibits no distension.   Musculoskeletal:         General: Edema present. No tenderness.   Neurological: She is alert and oriented to person, place, and time. No cranial nerve deficit. Coordination normal.   Skin: She is not diaphoretic. No pallor.   Psychiatric: She has a normal mood and affect. Her behavior is normal.   Vitals reviewed.    DATA REVIEW:  Most Recent Lipid Panel:   Lab Results   Component Value Date    CHOLSTRLTOT 384 (H) 06/10/2020    TRIGLYCERIDE 140 06/10/2020    HDL 58 06/10/2020     (H) 06/10/2020     Other Pertinent Blood Work:   Lab Results   Component Value Date    SODIUM 137 06/10/2020    POTASSIUM 4.6 06/10/2020    CHLORIDE 106 06/10/2020    CO2 23 06/10/2020    ANION 8.0 06/10/2020    GLUCOSE 76 06/10/2020    BUN 12 06/10/2020    CREATININE 0.81 06/10/2020    CALCIUM 9.3 06/10/2020    ASTSGOT 16 06/10/2020    ALTSGPT 12 06/10/2020    ALKPHOSPHAT 119 (H) 06/10/2020    TBILIRUBIN 0.3 06/10/2020    ALBUMIN 4.4 06/10/2020    AGRATIO 1.6 06/10/2020     ASSESSMENT AND PLAN  Patient Type, check all that apply:   Primary  Prevention  Established Atherosclerotic Cardiovascular Disease (ASCVD)  No  Other Established (non-atherosclerotic) Vascular Disease, if Present:  None  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):   Yes  Based on high LDL-c at young age and fh of premature cad in multiple family members - pathogenesis, risk and cascade screening discussed in depth  ACC/AHA Indication for Statin Therapy, missael all that apply:  LDL-C at baseline >190 mg/dl: Indication for High intensity statin   Calculated Risk for ASCVD, if applicable    N/A  Other Significant Risk Markers, if any, missael all that apply   Family history of premature ASCVD in first degree relative  Goal LDL-C and nonHDL-C based on Clinic Protocol  LDL-C:   <100 mg/dL  Lifestyle Recommendations From Today’s Visit:    Exercise: continue decent exercise habits  Eating Plan: Concentrate on  Low sat/Trans fat  Statin Therapy Recommendations from Today’s Visit:   - start rosuvastatin 40 mg daily  Non-Statin Medications Recommendations from Today’s Visit:   Consider based on response to statin and pcsk9i  Indication for PCSK9 Inhibitor, if applicable:  FH with suboptimal control of LDL-C despite maximally tolerated statin   - likely recommend adding next visit.  Patient is reluctant. Patient sent to FHFoundation website for further information.   Supplements Recommended at this visit:   None currently  Recommendations for Other Cardiovascular Risk Factors, missael all that apply:   - HTN - unclear level of control.  Feels inderal helps mood and HAs so will continue. Continue losartan. Check lytes, gfr, urine for ma. Start home bp monitoring. Consider addition of dhp ccb if above 130/80 at home  - Antiplatelet therapy - recommend starting low dose asa 81mg daily given CV risk.     Studies Ordered at Todays Visit: None  Blood Work Ordered At Today’s visit: Lipid panel, lp(a), cmp, tsh   Follow-Up: 8 weeks    Total time: 45-59min - chart review/prep, review of other  providers' records, imaging/lab review, face-to-face time for history/examination, ordering, prescribing,  review of results/meds/ treatment plan with patient/family/caregiver, documentation in EMR, care coordination (as needed)    Michael J Bloch, M.D.    CC:  MERRITT Ansari

## 2021-03-12 DIAGNOSIS — I10 ESSENTIAL HYPERTENSION: ICD-10-CM

## 2021-03-16 RX ORDER — PROPRANOLOL HYDROCHLORIDE 120 MG/1
120 CAPSULE, EXTENDED RELEASE ORAL DAILY
Qty: 90 CAPSULE | Refills: 0 | Status: SHIPPED | OUTPATIENT
Start: 2021-03-16 | End: 2021-03-23 | Stop reason: SDUPTHER

## 2021-03-23 DIAGNOSIS — I10 ESSENTIAL HYPERTENSION: ICD-10-CM

## 2021-03-23 RX ORDER — PROPRANOLOL HYDROCHLORIDE 120 MG/1
120 CAPSULE, EXTENDED RELEASE ORAL DAILY
Qty: 90 CAPSULE | Refills: 0 | Status: SHIPPED | OUTPATIENT
Start: 2021-03-23 | End: 2021-10-08 | Stop reason: SDUPTHER

## 2021-05-03 ENCOUNTER — APPOINTMENT (OUTPATIENT)
Dept: VASCULAR LAB | Facility: MEDICAL CENTER | Age: 51
End: 2021-05-03
Attending: INTERNAL MEDICINE
Payer: OTHER GOVERNMENT

## 2021-05-07 ENCOUNTER — OFFICE VISIT (OUTPATIENT)
Dept: MEDICAL GROUP | Facility: PHYSICIAN GROUP | Age: 51
End: 2021-05-07
Payer: OTHER GOVERNMENT

## 2021-05-07 VITALS
BODY MASS INDEX: 28.77 KG/M2 | SYSTOLIC BLOOD PRESSURE: 156 MMHG | HEIGHT: 66 IN | HEART RATE: 75 BPM | TEMPERATURE: 98.7 F | WEIGHT: 179 LBS | DIASTOLIC BLOOD PRESSURE: 100 MMHG | OXYGEN SATURATION: 94 %

## 2021-05-07 DIAGNOSIS — R60.9 LIPEDEMA: ICD-10-CM

## 2021-05-07 DIAGNOSIS — E78.01 FAMILIAL HYPERCHOLESTEREMIA: ICD-10-CM

## 2021-05-07 DIAGNOSIS — G44.219 EPISODIC TENSION-TYPE HEADACHE, NOT INTRACTABLE: ICD-10-CM

## 2021-05-07 DIAGNOSIS — E78.49 OTHER HYPERLIPIDEMIA: ICD-10-CM

## 2021-05-07 DIAGNOSIS — R60.9 SWELLING: ICD-10-CM

## 2021-05-07 DIAGNOSIS — I10 ESSENTIAL HYPERTENSION: ICD-10-CM

## 2021-05-07 PROCEDURE — 99214 OFFICE O/P EST MOD 30 MIN: CPT | Performed by: NURSE PRACTITIONER

## 2021-05-07 RX ORDER — BUTALBITAL, ACETAMINOPHEN AND CAFFEINE 50; 325; 40 MG/1; MG/1; MG/1
1 TABLET ORAL EVERY 4 HOURS PRN
Qty: 30 TABLET | Refills: 0 | Status: SHIPPED | OUTPATIENT
Start: 2021-05-07 | End: 2021-06-06

## 2021-05-07 NOTE — PROGRESS NOTES
Chief Complaint   Patient presents with   • Leg Swelling   • Medication Refill         Subjective:     Asher Galeas is a 50 y.o. female presenting for follow-up.    Familial hypercholesterolemia: Chronic and uncontrolled.  Notably dyslipidemic.  Established with vascular medicine and on high-dose rosuvastatin.  We have discussed importance of increasing physical activity to further reduce blood pressure and cholesterol.  She is considering additional treatment with vascular medicine but is hesitant at this time.  States several of her relatives with significantly high cholesterol have lived well into their 90s and low 100s.    Hypertension: Chronic and uncontrolled.  Notably hypertensive today.  States that her blood pressure is labile despite medication.  She feels quite stressed out and rushed today and she is attributing her blood pressure to that.  Takes propranolol twice daily and losartan in the mornings.  Denies headache, no vision changes, chest pain, claudication.    Knee swelling: New issue for the patient that she has noticed over the last couple of months.  She feels firm fatty tissue on the medial aspects of bilateral knees.  No trauma or injury.  No lower extremity edema below that site.  She is wondering if it could be lipomatosis.    Due for routine labs, orders already in place.  Due for mammogram.    Review of systems:      Denies chest pain, shortness of breath, sore throat, difficulty swallowing, new cough, dizziness, severe headache, altered cognition, changes in bowel or bladder habits, decreased sensation, painful or swollen lymph nodes.       Current Outpatient Medications:   •  butalbital/apap/caffeine -40 mg (FIORICET) -40 MG Tab, Take 1 tablet by mouth every four hours as needed for Headache for up to 30 days., Disp: 30 tablet, Rfl: 0  •  propranolol CR (INDERAL LA) 120 MG CAPSULE SR 24 HR, Take 1 capsule by mouth every day., Disp: 90 capsule, Rfl: 0  •   "rosuvastatin (CRESTOR) 40 MG tablet, Take 1 tablet by mouth every day., Disp: 30 tablet, Rfl: 11  •  losartan (COZAAR) 100 MG Tab, Take 1 Tab by mouth every day., Disp: 90 Tab, Rfl: 3  •  propranolol LA (INDERAL LA) 60 MG CAPSULE SR 24 HR, Take 1 Cap by mouth every evening., Disp: 90 Cap, Rfl: 2  •  amitriptyline (ELAVIL) 50 MG Tab, Take 1 Tab by mouth every evening., Disp: 90 Tab, Rfl: 3  •  estradiol (ESTRACE) 0.1 MG/GM vaginal cream, 1 g VAGINALLY daily for 2 weeks, then use 1 g 3 times a week for 3 months (Patient not taking: Reported on 5/7/2021), Disp: 1 Tube, Rfl: 2  •  aspirin 81 MG tablet, Take 81 mg by mouth every day., Disp: , Rfl:     Allergies, past medical history, past surgical history, family history, social history reviewed and updated    Objective:     Vitals: /100 (BP Location: Left arm, Patient Position: Sitting, BP Cuff Size: Adult)   Pulse 75   Temp 37.1 °C (98.7 °F) (Temporal)   Ht 1.676 m (5' 6\")   Wt 81.2 kg (179 lb)   LMP  (Within Months)   SpO2 94%   BMI 28.89 kg/m²   General: Alert, cooperative, dressed appropriately for weather / situation  Eyes:Normocephalic.  EOMI, no icterus or pallor.  Conjunctivae clear without erythema / irritation.  ENT:  External ears developed;   Heart: Regular rate and rhythm.  S1 and S2 normal.  No murmurs auscultated; no murmurs / bruits heard over bilateral carotids.  Bilateral radial pulses strong and equal.  Bilateral posterior tibial pulses strong and equal.  Respiratory: Normal respiratory effort.  Clear to auscultation bilaterally.  AP ratio 1:2   Abdomen: Non-distended;   Skin: Visible skin intact, dry, without rash.  Musculoskeletal: Significant swelling bilateral medial knees, firm to touch, no induration present, no notable erythema present, no bilateral lower extremity edema below the knees.  Gait is normal.  Bilateral  strength strong equal.  Moves extremities freely and equally bilaterally  Neuro:  AAOx3 Visual tracking intact, " no nystagmus;   Psych:  Affect/mood is normal, judgement is good, memory is intact, grooming is appropriate.    Assessment/Plan:     Asher was seen today for leg swelling and medication refill.    Diagnoses and all orders for this visit:    Familial hypercholesteremia  Comments:  Continue following with vascular, coronary calcium CT ordered for further evaluation.  Continue rosuvastatin and consider additional treatment  Orders:  -     CT-HEART W/O CONT EVAL CALCIUM; Future    Essential hypertension  Comments:  Check blood pressure daily, continue current regimen.  Return for BP check with home machine in 1 week.  Report 1 to 2 weeks of blood pressure data via [employer]  Orders:  -     CT-HEART W/O CONT EVAL CALCIUM; Future    Swelling  Comments:  Will obtain vascular ultrasound bilateral lower extremities to rule out venous disorder or DVT  Orders:  -     US-EXTREMITY VENOUS LOWER BILAT; Future  -     US-EXTREMITY NON VASCULAR BILATERAL; Future    Lipedema  Comments:  Soft tissue ultrasound also ordered for further evaluation.  If negative for any clear abnormalities will consider referral to GEN surge or plastics for input  Orders:  -     US-EXTREMITY VENOUS LOWER BILAT; Future  -     US-EXTREMITY NON VASCULAR BILATERAL; Future    Episodic tension-type headache, not intractable  Comments:  Advised good sleep routine, tight blood pressure control, use migraine medicine as needed sparingly.  Orders:  -     butalbital/apap/caffeine -40 mg (FIORICET) -40 MG Tab; Take 1 tablet by mouth every four hours as needed for Headache for up to 30 days.    Other hyperlipidemia  -     CT-HEART W/O CONT EVAL CALCIUM; Future          Return in about 4 weeks (around 6/4/2021).    Patient verbalized understanding and agreed to plan of care.  Encouraged to contact me with needs via [employer] or by phone if needed.      I have placed the above orders and discussed them with an approved delegating provider.  The MA is  performing the below orders under the direction of Dr Javed.    Please note that this dictation was created using voice recognition software. I have made every reasonable attempt to correct obvious errors, but I expect that there are errors of grammar and possibly content that I did not discover before finalizing the note.

## 2021-05-24 ENCOUNTER — HOSPITAL ENCOUNTER (OUTPATIENT)
Dept: LAB | Facility: MEDICAL CENTER | Age: 51
End: 2021-05-24
Attending: NURSE PRACTITIONER
Payer: OTHER GOVERNMENT

## 2021-05-24 ENCOUNTER — OFFICE VISIT (OUTPATIENT)
Dept: MEDICAL GROUP | Facility: PHYSICIAN GROUP | Age: 51
End: 2021-05-24
Payer: OTHER GOVERNMENT

## 2021-05-24 ENCOUNTER — HOSPITAL ENCOUNTER (OUTPATIENT)
Dept: LAB | Facility: MEDICAL CENTER | Age: 51
End: 2021-05-24
Attending: INTERNAL MEDICINE
Payer: OTHER GOVERNMENT

## 2021-05-24 VITALS
OXYGEN SATURATION: 96 % | HEIGHT: 66 IN | SYSTOLIC BLOOD PRESSURE: 148 MMHG | DIASTOLIC BLOOD PRESSURE: 88 MMHG | HEART RATE: 98 BPM | BODY MASS INDEX: 27.48 KG/M2 | TEMPERATURE: 99.1 F | WEIGHT: 171 LBS

## 2021-05-24 VITALS — DIASTOLIC BLOOD PRESSURE: 106 MMHG | SYSTOLIC BLOOD PRESSURE: 150 MMHG

## 2021-05-24 DIAGNOSIS — I10 ESSENTIAL HYPERTENSION: ICD-10-CM

## 2021-05-24 DIAGNOSIS — F41.9 ANXIETY: ICD-10-CM

## 2021-05-24 DIAGNOSIS — E78.01 FAMILIAL HYPERCHOLESTEREMIA: ICD-10-CM

## 2021-05-24 DIAGNOSIS — E55.9 VITAMIN D DEFICIENCY: ICD-10-CM

## 2021-05-24 DIAGNOSIS — G62.9 POLYNEUROPATHY: ICD-10-CM

## 2021-05-24 DIAGNOSIS — E04.1 THYROID NODULE: ICD-10-CM

## 2021-05-24 DIAGNOSIS — Z13.21 ENCOUNTER FOR VITAMIN DEFICIENCY SCREENING: ICD-10-CM

## 2021-05-24 DIAGNOSIS — C43.59 MALIGNANT MELANOMA OF TORSO EXCLUDING BREAST (HCC): ICD-10-CM

## 2021-05-24 LAB
ALBUMIN SERPL BCP-MCNC: 4.3 G/DL (ref 3.2–4.9)
ALBUMIN SERPL BCP-MCNC: 4.3 G/DL (ref 3.2–4.9)
ALBUMIN/GLOB SERPL: 1.5 G/DL
ALBUMIN/GLOB SERPL: 1.5 G/DL
ALP SERPL-CCNC: 127 U/L (ref 30–99)
ALP SERPL-CCNC: 127 U/L (ref 30–99)
ALT SERPL-CCNC: 13 U/L (ref 2–50)
ALT SERPL-CCNC: 13 U/L (ref 2–50)
ANION GAP SERPL CALC-SCNC: 10 MMOL/L (ref 7–16)
ANION GAP SERPL CALC-SCNC: 9 MMOL/L (ref 7–16)
AST SERPL-CCNC: 12 U/L (ref 12–45)
AST SERPL-CCNC: 13 U/L (ref 12–45)
BASOPHILS # BLD AUTO: 0.7 % (ref 0–1.8)
BASOPHILS # BLD: 0.04 K/UL (ref 0–0.12)
BILIRUB SERPL-MCNC: 0.3 MG/DL (ref 0.1–1.5)
BILIRUB SERPL-MCNC: 0.3 MG/DL (ref 0.1–1.5)
BUN SERPL-MCNC: 11 MG/DL (ref 8–22)
BUN SERPL-MCNC: 14 MG/DL (ref 8–22)
CALCIUM SERPL-MCNC: 9.5 MG/DL (ref 8.5–10.5)
CALCIUM SERPL-MCNC: 9.5 MG/DL (ref 8.5–10.5)
CHLORIDE SERPL-SCNC: 107 MMOL/L (ref 96–112)
CHLORIDE SERPL-SCNC: 107 MMOL/L (ref 96–112)
CHOLEST SERPL-MCNC: 308 MG/DL (ref 100–199)
CO2 SERPL-SCNC: 23 MMOL/L (ref 20–33)
CO2 SERPL-SCNC: 24 MMOL/L (ref 20–33)
CREAT SERPL-MCNC: 0.95 MG/DL (ref 0.5–1.4)
CREAT SERPL-MCNC: 0.99 MG/DL (ref 0.5–1.4)
CREAT UR-MCNC: 60.2 MG/DL
EOSINOPHIL # BLD AUTO: 0.17 K/UL (ref 0–0.51)
EOSINOPHIL NFR BLD: 3 % (ref 0–6.9)
ERYTHROCYTE [DISTWIDTH] IN BLOOD BY AUTOMATED COUNT: 44.9 FL (ref 35.9–50)
FASTING STATUS PATIENT QL REPORTED: NORMAL
FASTING STATUS PATIENT QL REPORTED: NORMAL
GLOBULIN SER CALC-MCNC: 2.8 G/DL (ref 1.9–3.5)
GLOBULIN SER CALC-MCNC: 2.8 G/DL (ref 1.9–3.5)
GLUCOSE SERPL-MCNC: 91 MG/DL (ref 65–99)
GLUCOSE SERPL-MCNC: 94 MG/DL (ref 65–99)
HCT VFR BLD AUTO: 38.5 % (ref 37–47)
HDLC SERPL-MCNC: 57 MG/DL
HGB BLD-MCNC: 12.5 G/DL (ref 12–16)
IMM GRANULOCYTES # BLD AUTO: 0.01 K/UL (ref 0–0.11)
IMM GRANULOCYTES NFR BLD AUTO: 0.2 % (ref 0–0.9)
LDLC SERPL CALC-MCNC: 228 MG/DL
LYMPHOCYTES # BLD AUTO: 2.96 K/UL (ref 1–4.8)
LYMPHOCYTES NFR BLD: 52.1 % (ref 22–41)
MCH RBC QN AUTO: 29.3 PG (ref 27–33)
MCHC RBC AUTO-ENTMCNC: 32.5 G/DL (ref 33.6–35)
MCV RBC AUTO: 90.2 FL (ref 81.4–97.8)
MICROALBUMIN UR-MCNC: <1.2 MG/DL
MICROALBUMIN/CREAT UR: NORMAL MG/G (ref 0–30)
MONOCYTES # BLD AUTO: 0.44 K/UL (ref 0–0.85)
MONOCYTES NFR BLD AUTO: 7.7 % (ref 0–13.4)
NEUTROPHILS # BLD AUTO: 2.06 K/UL (ref 2–7.15)
NEUTROPHILS NFR BLD: 36.3 % (ref 44–72)
NRBC # BLD AUTO: 0 K/UL
NRBC BLD-RTO: 0 /100 WBC
PLATELET # BLD AUTO: 257 K/UL (ref 164–446)
PMV BLD AUTO: 11.2 FL (ref 9–12.9)
POTASSIUM SERPL-SCNC: 3.8 MMOL/L (ref 3.6–5.5)
POTASSIUM SERPL-SCNC: 3.8 MMOL/L (ref 3.6–5.5)
PROT SERPL-MCNC: 7.1 G/DL (ref 6–8.2)
PROT SERPL-MCNC: 7.1 G/DL (ref 6–8.2)
RBC # BLD AUTO: 4.27 M/UL (ref 4.2–5.4)
SODIUM SERPL-SCNC: 140 MMOL/L (ref 135–145)
SODIUM SERPL-SCNC: 140 MMOL/L (ref 135–145)
TRIGL SERPL-MCNC: 113 MG/DL (ref 0–149)
TSH SERPL DL<=0.005 MIU/L-ACNC: 3.8 UIU/ML (ref 0.38–5.33)
TSH SERPL DL<=0.005 MIU/L-ACNC: 3.88 UIU/ML (ref 0.38–5.33)
WBC # BLD AUTO: 5.7 K/UL (ref 4.8–10.8)

## 2021-05-24 PROCEDURE — 80053 COMPREHEN METABOLIC PANEL: CPT

## 2021-05-24 PROCEDURE — 85025 COMPLETE CBC W/AUTO DIFF WBC: CPT

## 2021-05-24 PROCEDURE — 82043 UR ALBUMIN QUANTITATIVE: CPT

## 2021-05-24 PROCEDURE — 84443 ASSAY THYROID STIM HORMONE: CPT | Mod: 91

## 2021-05-24 PROCEDURE — 83695 ASSAY OF LIPOPROTEIN(A): CPT

## 2021-05-24 PROCEDURE — 82306 VITAMIN D 25 HYDROXY: CPT

## 2021-05-24 PROCEDURE — 84443 ASSAY THYROID STIM HORMONE: CPT

## 2021-05-24 PROCEDURE — 80053 COMPREHEN METABOLIC PANEL: CPT | Mod: 91

## 2021-05-24 PROCEDURE — 80061 LIPID PANEL: CPT

## 2021-05-24 PROCEDURE — 36415 COLL VENOUS BLD VENIPUNCTURE: CPT

## 2021-05-24 PROCEDURE — 99214 OFFICE O/P EST MOD 30 MIN: CPT | Performed by: NURSE PRACTITIONER

## 2021-05-24 PROCEDURE — 82570 ASSAY OF URINE CREATININE: CPT

## 2021-05-24 RX ORDER — NIFEDIPINE 30 MG
30 TABLET, EXTENDED RELEASE ORAL DAILY
Qty: 30 TABLET | Refills: 2 | Status: SHIPPED | OUTPATIENT
Start: 2021-05-24 | End: 2021-10-08

## 2021-05-24 RX ORDER — ALPRAZOLAM 0.5 MG/1
0.5 TABLET ORAL NIGHTLY PRN
Qty: 30 TABLET | Refills: 0 | Status: SHIPPED | OUTPATIENT
Start: 2021-05-24 | End: 2021-06-23

## 2021-05-24 ASSESSMENT — PATIENT HEALTH QUESTIONNAIRE - PHQ9: CLINICAL INTERPRETATION OF PHQ2 SCORE: 0

## 2021-05-24 ASSESSMENT — FIBROSIS 4 INDEX: FIB4 SCORE: 0.67

## 2021-05-24 NOTE — PROGRESS NOTES
Chief Complaint   Patient presents with   • Follow-Up     hypertension   • Itching         Subjective:     Asher Galeas is a 50 y.o. female presenting for follow-up.    Hypertension: Chronic and uncontrolled.  Patient is monitoring her blood pressure routinely at home and is persistently getting diastolic blood pressures greater than 100.  148/88 today.  She is currently on losartan 100 mg as well as propranolol 120 mg extended release.  She has previously tried diuretics but did not tolerate them due to urinary frequency.  Has tried amlodipine in the past but experienced significant lower extremity swelling.    Nerve pain: Patient reports progressive nerve pain starting at the bases of her feet that is now extended over her whole body.  She describes it as hot electric pinpricks that then lead to significant itching.  No rash or skin changes present.      Anxiety and depression: Chronic and uncontrolled.  Patient reports significant anhedonia and overall lack of self-care.  She is showering once weekly and notes that she is neglecting self-care tasks such as brushing her teeth.  She does feel like at times she gets into a manic state where she is easily distracted, attempting to do multiple tasks at once, and feels like she crashes afterwards.  Does have a significant social history of traumatic events.  Not currently established with mental health provider.  Not experiencing any suicidal or homicidal ideation, not engaging in any intentional self-harm.     Review of systems:      Denies chest pain, shortness of breath, sore throat, difficulty swallowing, new cough, dizziness, severe headache, altered cognition, changes in bowel or bladder habits,  changes in vision, myalgias, painful or swollen lymph nodes.       Current Outpatient Medications:   •  ALPRAZolam (XANAX) 0.5 MG Tab, Take 1 tablet by mouth at bedtime as needed for Sleep or Anxiety for up to 30 days., Disp: 30 tablet, Rfl: 0  •   "NIFEdipine (ADALAT CC) 30 MG CR tablet, Take 1 tablet by mouth every day., Disp: 30 tablet, Rfl: 2  •  butalbital/apap/caffeine -40 mg (FIORICET) -40 MG Tab, Take 1 tablet by mouth every four hours as needed for Headache for up to 30 days., Disp: 30 tablet, Rfl: 0  •  propranolol CR (INDERAL LA) 120 MG CAPSULE SR 24 HR, Take 1 capsule by mouth every day., Disp: 90 capsule, Rfl: 0  •  rosuvastatin (CRESTOR) 40 MG tablet, Take 1 tablet by mouth every day., Disp: 30 tablet, Rfl: 11  •  losartan (COZAAR) 100 MG Tab, Take 1 Tab by mouth every day., Disp: 90 Tab, Rfl: 3  •  propranolol LA (INDERAL LA) 60 MG CAPSULE SR 24 HR, Take 1 Cap by mouth every evening., Disp: 90 Cap, Rfl: 2  •  amitriptyline (ELAVIL) 50 MG Tab, Take 1 Tab by mouth every evening., Disp: 90 Tab, Rfl: 3  •  aspirin 81 MG tablet, Take 81 mg by mouth every day., Disp: , Rfl:     Allergies, past medical history, past surgical history, family history, social history reviewed and updated    Objective:     Vitals: /88 (BP Location: Left arm, Patient Position: Sitting, BP Cuff Size: Adult)   Pulse 98   Temp 37.3 °C (99.1 °F) (Temporal)   Ht 1.676 m (5' 6\")   Wt 77.6 kg (171 lb)   SpO2 96%   BMI 27.60 kg/m²   Constitutional: Alert, no distress, well-groomed.  Skin: Warm, dry, good turgor, no rashes in visible areas.  Eye: Equal, round and reactive, conjunctiva clear, lids normal.  ENMT: Lips without lesions, good dentition, moist mucous membranes.  Neck: Trachea midline, no masses, no thyromegaly.  Respiratory: Unlabored respiratory effort, no cough.  MSK: Normal gait, moves all extremities.  Neuro: Grossly non-focal.   Psych: Alert and oriented x3, normal affect and mood.    Assessment/Plan:     Asher was seen today for follow-up and itching.    Diagnoses and all orders for this visit:    Polyneuropathy  Comments:  Further diagnostics are indicated, discussed modalities with patient, referral placed to neurology  Orders:  -     " REFERRAL TO NEUROLOGY    Anxiety  Comments:  Continue TCA, additional medication management needed.  Discussed risk of serotonin toxicity with multiple serotonin modulators.  Referral to behavioral health.  As she is experiencing significant insomnia and racing thoughts, will provide a small dose of alprazolam to use as needed in the interim.  Discussed high risk of dependence of this medication class and advised conservative use.  Orders:  -     REFERRAL TO BEHAVIORAL HEALTH  -     ALPRAZolam (XANAX) 0.5 MG Tab; Take 1 tablet by mouth at bedtime as needed for Sleep or Anxiety for up to 30 days.    Essential hypertension  Comments:  Continue current regimen and monitor blood pressure routinely.  As she is still above goal, will do starting dose of nifedipine.  Orders:  -     NIFEdipine (ADALAT CC) 30 MG CR tablet; Take 1 tablet by mouth every day.    In accordance with [place] Bill 474, this patient complies with all of the following:    -I have made a good amie effort to review previous and current medical records    -Physical and psychological exam is been done including risk of abuse, mental health assessment.    -Current treatment plan available in patient's chart    -Alternative treatment options have been discussed, attempted, and found to be insufficient.    Potential risks and benefits reviewed at length including but not limited to risk of dependency, addiction, overdose    -Controlled substance agreement reviewed and completed along with a urine drug screen yearly.    -Discussed common side effects and mitigation strategies of them.    -PDMP reviewed    -Advised to avoid use of any other sedative agents concurrently with controlled substances including prescriptions, alcohol, marijuana.        Return in about 3 months (around 8/24/2021).    Patient verbalized understanding and agreed to plan of care.  Encouraged to contact me with needs via [employer] or by phone if needed.      I have placed the above orders  and discussed them with an approved delegating provider.  The MA is performing the below orders under the direction of Dr Javed.    Please note that this dictation was created using voice recognition software. I have made every reasonable attempt to correct obvious errors, but I expect that there are errors of grammar and possibly content that I did not discover before finalizing the note.

## 2021-05-24 NOTE — PATIENT INSTRUCTIONS
[facility] Crow[facility]    [address]  [address]  [place] [address]  [phone]    [facility]?  [address]   [facility], [address]  [place] [address]  Direct: [phone]    Fax: [phone]  [email or web address]

## 2021-05-26 LAB
25(OH)D3 SERPL-MCNC: 21 NG/ML (ref 30–80)
LPA SERPL-MCNC: 25 MG/DL

## 2021-06-01 ENCOUNTER — HOSPITAL ENCOUNTER (OUTPATIENT)
Dept: RADIOLOGY | Facility: MEDICAL CENTER | Age: 51
End: 2021-06-01
Attending: NURSE PRACTITIONER
Payer: OTHER GOVERNMENT

## 2021-06-01 DIAGNOSIS — R60.9 SWELLING: ICD-10-CM

## 2021-06-01 DIAGNOSIS — R60.9 LIPEDEMA: ICD-10-CM

## 2021-06-01 PROCEDURE — 93970 EXTREMITY STUDY: CPT

## 2021-06-01 PROCEDURE — 76881 US COMPL JOINT R-T W/IMG: CPT

## 2021-06-02 ENCOUNTER — PATIENT MESSAGE (OUTPATIENT)
Dept: MEDICAL GROUP | Facility: PHYSICIAN GROUP | Age: 51
End: 2021-06-02

## 2021-06-02 VITALS — DIASTOLIC BLOOD PRESSURE: 89 MMHG | SYSTOLIC BLOOD PRESSURE: 134 MMHG

## 2021-06-02 DIAGNOSIS — I10 ESSENTIAL HYPERTENSION: ICD-10-CM

## 2021-06-03 VITALS — SYSTOLIC BLOOD PRESSURE: 102 MMHG | DIASTOLIC BLOOD PRESSURE: 68 MMHG

## 2021-06-08 ENCOUNTER — DOCUMENTATION (OUTPATIENT)
Dept: VASCULAR LAB | Facility: MEDICAL CENTER | Age: 51
End: 2021-06-08

## 2021-06-08 NOTE — PROGRESS NOTES
[facility]    Received HTN referral from MERRITT Ansari on 6/1/21    Scheduled NP for 6/17 @ 1:30p at [facility]    Insurance: [employer]  PCP: [facility]  Locations to be seen: [facility], any    [facility] at [phone], fax [phone]    Nydia Danielson, AmandaD

## 2021-06-18 ENCOUNTER — TELEPHONE (OUTPATIENT)
Dept: VASCULAR LAB | Facility: MEDICAL CENTER | Age: 51
End: 2021-06-18

## 2021-06-18 NOTE — TELEPHONE ENCOUNTER
Pt cancelled HTN visit with the pharmacist.  Left VM message to reschedule  Alix Hui, Clinical Pharmacist, CDE, CACP

## 2021-06-21 ENCOUNTER — OFFICE VISIT (OUTPATIENT)
Dept: NEUROLOGY | Facility: MEDICAL CENTER | Age: 51
End: 2021-06-21
Attending: PSYCHIATRY & NEUROLOGY
Payer: OTHER GOVERNMENT

## 2021-06-21 VITALS
HEART RATE: 72 BPM | BODY MASS INDEX: 28.06 KG/M2 | OXYGEN SATURATION: 94 % | WEIGHT: 174.6 LBS | TEMPERATURE: 97.8 F | HEIGHT: 66 IN | DIASTOLIC BLOOD PRESSURE: 100 MMHG | SYSTOLIC BLOOD PRESSURE: 148 MMHG

## 2021-06-21 DIAGNOSIS — L29.9 PRURITIC DISORDER: ICD-10-CM

## 2021-06-21 PROCEDURE — 99212 OFFICE O/P EST SF 10 MIN: CPT | Performed by: PSYCHIATRY & NEUROLOGY

## 2021-06-21 PROCEDURE — 99203 OFFICE O/P NEW LOW 30 MIN: CPT | Performed by: PSYCHIATRY & NEUROLOGY

## 2021-06-21 RX ORDER — HYDROXYZINE 50 MG/1
TABLET, FILM COATED ORAL
COMMUNITY
End: 2022-10-13

## 2021-06-21 ASSESSMENT — FIBROSIS 4 INDEX: FIB4 SCORE: 0.67

## 2021-06-21 NOTE — PROGRESS NOTES
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 The symptoms \"systemic itching and pinging\" (not normal itches)- feels internal and there are no outwards redness on the skin. The symptoms are random and can be along the outside of both arms (either one or the other) and even on various parts of her legs, scalp (no specific area on her scalp).    Fabrics can feel abrasive and irritating to her legs (such as 100% cotton sheet).    The seams in her clothes will irritate during a itch vs a not itchy phase.    She feels there is a psychological component and right before her recent trip, her subjective anxiety was raised and if she is distracted the symptoms can be nearly absent or absent.    Going on the recent trips required more socialness that she liked and she noticed that she was worrying about going on these 2 trips ([place]- High School Graduation and then [place] to visit her parents- the mother is the sole care taker for her 92 year old father).   Asher was \"busy all the time\" that she did not notice at being itchy at all during that week.  Even at night during this time, she did not notice any itching events or episodes during that 1 week period of time.    She has had up to 5 years without any episodes of itching over the last 30 years.    Never had pruritis inside the mouth or on the buttock in her adult life.    Atarax given 5 years ago for an itching episode and did not help.    No history of numbness.tingling of burning sensation(s) of the extremities x 4 or the facial areas lasting days to weeks in her adult life.    No history of vertiginous events lasting days to weeks and resolving in her adult life.    No history of facial numbness/pain or Trigeminal Neuralgic events in her adult " life.    No involuntary movements of the limbs in the recent years (tremors,myoclonus) nor any cramps,spasms or stiffness of the limbs.    Patient Active Problem List    Diagnosis Date Noted   • Anxiety 06/15/2020   • Menopausal and female climacteric states 06/15/2020   • Melanoma (HCC) 11/12/2019   • Tension type headache 11/12/2019   • Familial hypercholesteremia 11/12/2019   • Essential hypertension 11/12/2019   • History of opiate therapy 11/12/2019   • Thyroid nodule 11/12/2019       Past medical history:   Past Medical History:   Diagnosis Date   • Cancer (HCC)     back   • Head ache    • Hyperlipidemia    • Hypertension    • Migraine        Past surgical history:   Past Surgical History:   Procedure Laterality Date   • TONSILLECTOMY           Social history:   Social History     Socioeconomic History   • Marital status:      Spouse name: Not on file   • Number of children: Not on file   • Years of education: Not on file   • Highest education level: Not on file   Occupational History   • Not on file   Tobacco Use   • Smoking status: Former Smoker     Packs/day: 0.00   • Smokeless tobacco: Never Used   • Tobacco comment: former light smoker; quit when brother had bypass   Vaping Use   • Vaping Use: Never used   Substance and Sexual Activity   • Alcohol use: Yes   • Drug use: Not on file     Comment: kratom   • Sexual activity: Yes     Partners: Male   Other Topics Concern   • Not on file   Social History Narrative   • Not on file     Social Determinants of Health     Financial Resource Strain:    • Difficulty of Paying Living Expenses:    Food Insecurity:    • Worried About Running Out of Food in the Last Year:    • Ran Out of Food in the Last Year:    Transportation Needs:    • Lack of Transportation (Medical):    • Lack of Transportation (Non-Medical):    Physical Activity:    • Days of Exercise per Week:    • Minutes of Exercise per Session:    Stress:    • Feeling of Stress :    Social Connections:     • Frequency of Communication with Friends and Family:    • Frequency of Social Gatherings with Friends and Family:    • Attends Yazdanism Services:    • Active Member of Clubs or Organizations:    • Attends Club or Organization Meetings:    • Marital Status:    Intimate Partner Violence:    • Fear of Current or Ex-Partner:    • Emotionally Abused:    • Physically Abused:    • Sexually Abused:        Family history:   Family History   Problem Relation Age of Onset   • Hyperlipidemia Mother    • Heart Disease Sister    • Hypertension Sister    • Hyperlipidemia Sister    • Hyperlipidemia Brother    • Heart Disease Brother    • Hypertension Brother          Current medications:   Current Outpatient Medications   Medication   • ALPRAZolam (XANAX) 0.5 MG Tab   • propranolol CR (INDERAL LA) 120 MG CAPSULE SR 24 HR   • rosuvastatin (CRESTOR) 40 MG tablet   • losartan (COZAAR) 100 MG Tab   • propranolol LA (INDERAL LA) 60 MG CAPSULE SR 24 HR   • amitriptyline (ELAVIL) 50 MG Tab   • NIFEdipine (ADALAT CC) 30 MG CR tablet   • aspirin 81 MG tablet     No current facility-administered medications for this visit.       Medication Allergy:  No Known Allergies          ROS:  (In the last 2-4 weeks unless otherwise stated for an additional period of time).    Constitutional: Denies fevers,chills,sweats,involuntary and unprovoked/progressive  weight loss.  Eyes: Denies changes in vision (blurring,double or loss of) nor any eye pain(s)- static,evolving or with eye movements.  Ears/Nose/Throat/Mouth: Denies nasal congestion,sore throat,ear pain(s) or changes in hearing ability.  No tinnitus.  Cardiovascular: Denies chest pain/pressure at rest or with exertion such as climbing stairs or walking. No  palpitations. There has been no  orthostatic lightheadedness/faintness events.  Respiratory: Denies SOB with exertion or when sleeping (while laying down).  Gastrointestinal/Hepatic: Denies abdominal pain, nausea, vomiting, diarrhea,  "constipation or GI bleeding   Genitourinary: Denies bladder dysfunction, dysuria or frequency   Musculoskeletal/Rheum: Denies joint pain and swelling   Skin/Breast: Denies rash, denies breast lumps or discharge   Neurological: Denies new or evolving headaches, new or evolving confusion/disorientation, seizure like events, or focal weakness/parasthesias.  There has been no falls or near falls.  Psychiatric: Denies feeling anxious,depressed,suicidal or having auditory/visual hallucinations.  Endocrine: Denies hx of diabetes or thyroid dysfunction   Heme/Oncology/ Denies easy or spontaneous bruising/bleeding from nose,skin  or a known bleeding disorder   Allergic/Immunologic: Denies hx of allergies           Physical examination:   Vitals:    06/21/21 1042   BP: 148/100   BP Location: Right arm   Pulse: 72   Temp: 36.6 °C (97.8 °F)   TempSrc: Temporal   SpO2: 94%   Weight: 79.2 kg (174 lb 9.7 oz)   Height: 1.676 m (5' 6\")       Normal cephalic atraumatic.  General: Full Range of Movement around the Neck in all directions without restrictions or discrete pain evoked triggers.  No lower extremity edema.  No skin lesions seen.      Neurological  Exam:      Mental status: Awake, alert and fully oriented to person, place, time and situation. Normal attention, concentration and fund of knowledge for education level.  Did not appear/act combative,irritable,anxious,paranoid/delusional or aggressive to or with me.  Speech and language: Speech is fluent without errors, clear, intact to repetition and intact to naming.     Follows 3 step motor commands in sequence without significant delay and correctly.    Cranial nerve exam:  II: Pupils are equally round and reactive to light. Visual fields are intact by confrontation.  III, IV, VI: EOMI, no diplopia, no ptosis.  V: Sensation to light touch is normal over V1-3 distributions bilaterally.  .  VII: Facial movements are symmetrical. There is no facial droop. .  IX: Palate elevates " symmetrically, uvula is midline. Dysarthria is not present.  XI: Shoulder shrug are symmetrical and strong.   XII: Tongue protrudes midline. and No tongue fasciculations.        Motor exam:  Muscle tone is normal in all 4 limbs. and No abnormal movements appreciated.    Muscle strength:    Neck Flexors/Extensors: 5/5       Right  Left  Deltoid   5/5  5/5      Biceps   5/5  5/5  Triceps  5/5  5/5   Wrist extensors 5/5  5/5  Wrist flexors  5/5  5/5     5/5  5/5  Interossei  5/5  5/5  Thenar (APB)  5/5  5/5   Hip flexors  5/5  5/5  Quadriceps  5/5  5/5    Hamstrings  5/5  5/5  Dorsiflexors  5/5  5/5  Plantarflexors  5/5  5/5  Toe extension  5/5  5/5  NT = not tested    Sensory exam:  Intact to Proprioception in bilateral lower extremity.    Vibratory threshold at great toes - 12 seconds at the great toes, 14 seconds at the knees.      Reflexes:       Right  Left  Biceps   2/4  2/4  Triceps  2/4  2/4  Brachioradialis 2/4  2/4  Knee jerk  2/4  2/4  Ankle jerk  2/4  2/4     Frontal release signs are normal.    bilateral toes are downgoing to plantar stimulation..    Coordination (finger-to-nose, heel/knee/shin, rapid alternating movements) was normal.     There was no truncal ataxia, no tremors, and no dysmetria.     Station and gait - Easily stands up from exam chair without retropulsion,veering,leaning,swaying (to either side).   Arm swing symmetrical.    No Rombergism.      Labs and Tests: Reviewed from the last 3 months.     NEUROIMAGING: (No recent Brain Imaging)      Impression/Plans/Recommendations:    1. Chronic Episodic Pruritis- since early 20(s)    Description of events of generalized but asymmetrical pruritis that can go away for many years at a time and return with or associated with self admitted worry/anxiety suggests the potential for psychosomatic pruritis.    Most recently she was visiting her parents in [place] for 1 week and had no symptoms which she agrees was because she was distracted.    I  reviewed the potential causes of period pruritis that date back nearly 30 years and the most potential concerning issue would be a CNS condition such as M.S. so I have recommended she have a screening Brain MRI to see if there is evidence for demyelinative dz.  (historically she has not had any other discrete MS type events such as optic neuritis, partial transverse myelitis, trigeminal neuralgia, unexplained urinary urgency/frequency, excessive fatigue in the heat).    I do not feel that her symptoms and the periodicity over 30 years and being sympto free for many years at a time is a consequence or related to large or more specifically small fiber polyneuropathy-> I reviewed this specific issue with Asher today.    Plans:    A. Check ferritin level given low normal HCT (recent).    B. Recheck BUN/Creatinine.    C. Brain MRI without gado (screen for CNS demyelination)          I have performed  a history and physical exam and a directed /focused  ROS today.    Total time spent today or this patient's care was 38  minutes   and included reviewing diagnostic workup to date (labs and imaging that include interpreting such tests relevant to this patient's neurological condition) and ordering additional tests as well as counseling and educating Asher and  documenting the clinical information in the EMR.    Follow up PRN at this point.    Angus Chandra MD  [employer] [employer].   [facility]

## 2021-06-22 VITALS — DIASTOLIC BLOOD PRESSURE: 87 MMHG | SYSTOLIC BLOOD PRESSURE: 123 MMHG

## 2021-06-30 ENCOUNTER — DOCUMENTATION (OUTPATIENT)
Dept: VASCULAR LAB | Facility: MEDICAL CENTER | Age: 51
End: 2021-06-30

## 2021-06-30 NOTE — PROGRESS NOTES
[facility]     Received HTN referral from MERRITT Ansari on 6/1/21     Called pt to schedule appt to establish care - no answer. LVM.     Insurance: Eliel  PCP: [facility]  Locations to be seen: [place]OR[place]IN Luevano, any     [facility] at [phone], fax [phone]

## 2021-07-09 ENCOUNTER — DOCUMENTATION (OUTPATIENT)
Dept: VASCULAR LAB | Facility: MEDICAL CENTER | Age: 51
End: 2021-07-09

## 2021-07-09 NOTE — Clinical Note
Starr,  She declined to see us for hypertensive/cholesterol management at this time.  She will continue to manage it with diet and exercise.  She will consider an appointment with us in the future if needed.  Thanks,  Dmitriy

## 2021-07-09 NOTE — PROGRESS NOTES
She would prefer not to see us in clinic for hypertensive and cholesterol management at this time.  She would consider it in the future if lifestyle changes do not help.    Yvon Barrera, PharmD, MS, BCACP, [facility]  Phone: [phone],  Fax: [phone]  On call: [phone]    This note was created using voice recognition software (Dragon). The accuracy of the dictation is limited by the abilities of the software. I have reviewed the note prior to signing, however some errors in grammar and context are still possible. If you have any questions related to this note please do not hesitate to contact our office.

## 2021-07-11 ENCOUNTER — HOSPITAL ENCOUNTER (OUTPATIENT)
Dept: RADIOLOGY | Facility: MEDICAL CENTER | Age: 51
End: 2021-07-11
Attending: PSYCHIATRY & NEUROLOGY
Payer: OTHER GOVERNMENT

## 2021-07-11 DIAGNOSIS — L29.9 PRURITIC DISORDER: ICD-10-CM

## 2021-07-11 PROCEDURE — 70551 MRI BRAIN STEM W/O DYE: CPT

## 2021-07-20 ENCOUNTER — APPOINTMENT (OUTPATIENT)
Dept: BEHAVIORAL HEALTH | Facility: CLINIC | Age: 51
End: 2021-07-20
Payer: OTHER GOVERNMENT

## 2021-10-08 ENCOUNTER — OFFICE VISIT (OUTPATIENT)
Dept: MEDICAL GROUP | Facility: PHYSICIAN GROUP | Age: 51
End: 2021-10-08
Payer: OTHER GOVERNMENT

## 2021-10-08 VITALS
OXYGEN SATURATION: 91 % | HEART RATE: 67 BPM | RESPIRATION RATE: 12 BRPM | SYSTOLIC BLOOD PRESSURE: 122 MMHG | TEMPERATURE: 98.7 F | WEIGHT: 178 LBS | HEIGHT: 66 IN | BODY MASS INDEX: 28.61 KG/M2 | DIASTOLIC BLOOD PRESSURE: 90 MMHG

## 2021-10-08 DIAGNOSIS — E78.01 FAMILIAL HYPERCHOLESTEREMIA: ICD-10-CM

## 2021-10-08 DIAGNOSIS — I10 ESSENTIAL HYPERTENSION: ICD-10-CM

## 2021-10-08 DIAGNOSIS — G44.219 EPISODIC TENSION-TYPE HEADACHE, NOT INTRACTABLE: ICD-10-CM

## 2021-10-08 DIAGNOSIS — G25.81 RESTLESS LEGS SYNDROME (RLS): ICD-10-CM

## 2021-10-08 DIAGNOSIS — F41.9 ANXIETY: ICD-10-CM

## 2021-10-08 PROCEDURE — 99214 OFFICE O/P EST MOD 30 MIN: CPT | Performed by: NURSE PRACTITIONER

## 2021-10-08 RX ORDER — PROPRANOLOL HCL 60 MG
60 CAPSULE, EXTENDED RELEASE 24HR ORAL EVERY EVENING
Qty: 90 CAPSULE | Refills: 1 | Status: SHIPPED | OUTPATIENT
Start: 2021-10-08 | End: 2021-12-08 | Stop reason: SDUPTHER

## 2021-10-08 RX ORDER — CYCLOBENZAPRINE HCL 10 MG
10 TABLET ORAL NIGHTLY PRN
Qty: 30 TABLET | Refills: 1 | Status: SHIPPED | OUTPATIENT
Start: 2021-10-08 | End: 2022-07-28

## 2021-10-08 RX ORDER — PROPRANOLOL HYDROCHLORIDE 120 MG/1
120 CAPSULE, EXTENDED RELEASE ORAL DAILY
Qty: 90 CAPSULE | Refills: 1 | Status: SHIPPED | OUTPATIENT
Start: 2021-10-08 | End: 2022-04-28 | Stop reason: SDUPTHER

## 2021-10-08 RX ORDER — BUTALBITAL, ACETAMINOPHEN AND CAFFEINE 50; 325; 40 MG/1; MG/1; MG/1
1 TABLET ORAL EVERY 4 HOURS PRN
COMMUNITY
End: 2021-10-08 | Stop reason: SDUPTHER

## 2021-10-08 RX ORDER — BUTALBITAL, ACETAMINOPHEN AND CAFFEINE 50; 325; 40 MG/1; MG/1; MG/1
1 TABLET ORAL EVERY 4 HOURS PRN
Qty: 30 TABLET | Refills: 0 | Status: SHIPPED | OUTPATIENT
Start: 2021-10-08 | End: 2021-11-07

## 2021-10-08 ASSESSMENT — FIBROSIS 4 INDEX: FIB4 SCORE: 0.69

## 2021-10-08 NOTE — ASSESSMENT & PLAN NOTE
This is a chronic condition, stable.  She does reports previously saw neurologist and started amitriptyline.  She does endorse amitriptyline helps with decreasing frequency and lowering headache intensity.  She reports tried to stopped amitriptyline and it does increase her anxiety very high.   > Continue butalbital PRN for headache relief.  Continue supportive measures for headache.  Consent for opiate prescription today.  Consider UDA/controlled substance agreement if continue use.   Obtained and reviewed patient utilization report from [facility] pharmacy database on 10/8/2021 2:58 PM  prior to writing prescription for controlled substance II, III or IV per [place] bill . Based on assessment of the report, the prescription is medically necessary.

## 2021-10-08 NOTE — ASSESSMENT & PLAN NOTE
This is a new condition.  She reports having severe restless leg to bilateral knees/ankles nightly.  She reports difficulty sleeping.  She does endorse that she had clonidine from previous provider, which helped with her sleep; and her previous PCP provided her with Xanax for additional anxiety/sleep aid.  She reports having low energy as she is unable to sleep. She does endorse trying flexeril twice in the last two weeks and reports helped significantly with her RLS and sleep.   > Trial flexeril 10 mg nightly.  Follow up in 2 months for efficacy.

## 2021-10-08 NOTE — PROGRESS NOTES
Subjective  Chief Complaint  Establish care to manage her chronic conditions    History of Present Illness  Asher Galeas is a 51 y.o. female. This patient is here today to establish care.    Patient Active Problem List    Diagnosis Date Noted   • Restless legs syndrome (RLS) 10/08/2021   • Anxiety 06/15/2020   • Menopausal and female climacteric states 06/15/2020   • Melanoma (HCC) 11/12/2019   • Tension type headache 11/12/2019   • Familial hypercholesteremia 11/12/2019   • Essential hypertension 11/12/2019   • History of opiate therapy 11/12/2019   • Thyroid nodule 11/12/2019     Past Medical History    Allergies: Patient has no known allergies.  Past Medical History:   Diagnosis Date   • Cancer (HCC)     back   • Head ache    • Hyperlipidemia    • Hypertension    • Migraine      Past Surgical History:   Procedure Laterality Date   • TONSILLECTOMY       Current Outpatient Medications Ordered in Epic   Medication Sig Dispense Refill   • cyclobenzaprine (FLEXERIL) 10 mg Tab Take 1 Tablet by mouth at bedtime as needed for Muscle Spasms (restless leg at night). 30 Tablet 1   • propranolol CR (INDERAL LA) 120 MG CAPSULE SR 24 HR Take 1 Capsule by mouth every day. 90 Capsule 1   • propranolol LA (INDERAL LA) 60 MG CAPSULE SR 24 HR Take 1 Capsule by mouth every evening. 90 Capsule 1   • butalbital/apap/caffeine -40 mg (FIORICET) -40 MG Tab Take 1 Tablet by mouth every four hours as needed for Headache or Migraine for up to 30 days. 30 Tablet 0   • hydrOXYzine HCl (ATARAX) 50 MG Tab      • rosuvastatin (CRESTOR) 40 MG tablet Take 1 tablet by mouth every day. 30 tablet 11   • losartan (COZAAR) 100 MG Tab Take 1 Tab by mouth every day. 90 Tab 3   • amitriptyline (ELAVIL) 50 MG Tab Take 1 Tab by mouth every evening. 90 Tab 3     No current Epic-ordered facility-administered medications on file.     Family History:    Family History   Problem Relation Age of Onset   • Hyperlipidemia Mother    •  Heart Disease Sister    • Hypertension Sister    • Hyperlipidemia Sister    • Hyperlipidemia Brother    • Heart Disease Brother    • Hypertension Brother    • Cancer Neg Hx    • Diabetes Neg Hx    • Stroke Neg Hx       Personal/Social History:    Social History     Tobacco Use   • Smoking status: Former Smoker     Packs/day: 0.00   • Smokeless tobacco: Never Used   • Tobacco comment: former light smoker; quit when brother had bypass   Vaping Use   • Vaping Use: Never used   Substance Use Topics   • Alcohol use: Yes   • Drug use: Not on file     Comment: kratom - stopped 2 weeks ago as of 10/8/2021     Social History     Social History Narrative   • Not on file      Current Outpatient Medications   Medication Sig Dispense Refill   • cyclobenzaprine (FLEXERIL) 10 mg Tab Take 1 Tablet by mouth at bedtime as needed for Muscle Spasms (restless leg at night). 30 Tablet 1   • propranolol CR (INDERAL LA) 120 MG CAPSULE SR 24 HR Take 1 Capsule by mouth every day. 90 Capsule 1   • propranolol LA (INDERAL LA) 60 MG CAPSULE SR 24 HR Take 1 Capsule by mouth every evening. 90 Capsule 1   • butalbital/apap/caffeine -40 mg (FIORICET) -40 MG Tab Take 1 Tablet by mouth every four hours as needed for Headache or Migraine for up to 30 days. 30 Tablet 0   • hydrOXYzine HCl (ATARAX) 50 MG Tab      • rosuvastatin (CRESTOR) 40 MG tablet Take 1 tablet by mouth every day. 30 tablet 11   • losartan (COZAAR) 100 MG Tab Take 1 Tab by mouth every day. 90 Tab 3   • amitriptyline (ELAVIL) 50 MG Tab Take 1 Tab by mouth every evening. 90 Tab 3     No current facility-administered medications for this visit.     Review of Systems  General: Negative for fever/chills.   Eyes:  Negative for vision changes.  ENT:  Negative for hearing changes.   Respiratory:  Negative for cough and dyspnea.    Cardiovascular:  Negative for chest pain and palpitations.  Gastrointestinal:  Negative for nausea/vomiting.   Genitourinary:  Negative for dysuria.  "  Musculoskeletal:  See below.  Skin:  Negative for rash.   Neurological:  Negative for numbness/tingling.   Heme/Lymph:  Does not bruise/bleed easily.    Objective  Physical Exam  /90 (BP Location: Left arm, Patient Position: Sitting, BP Cuff Size: Adult)   Pulse 67   Temp 37.1 °C (98.7 °F) (Temporal)   Resp 12   Ht 1.676 m (5' 6\")   Wt 80.7 kg (178 lb)   SpO2 91%  Body mass index is 28.73 kg/m².  General:  Alert and oriented.  Well appearing.  NAD.  Head:  Normocephalic.   Eyes:  Eyes conjunctiva clear lids without ptosis.    ENT: Ears normal shape and contour.   Neck: Supple without JVD.   Pulmonary:  Normal effort.  Clear to ausculation without rales, ronchi, or wheezing.  Cardiovascular:  Regular rate and rhythm without murmur, rubs or gallop.  Radial pulses are intact and equal bilaterally.  Gastrointestinal: Abdomen soft, nontender, nondistended. Normal bowel sounds. Liver and spleen are not palpable.  Musculoskeletal:  No extremity cyanosis, clubbing, or edema.  Skin:  Warm and dry.  No obvious lesions.  Neurologic: Grossly intact.  Sensation intact.   Psych: Normal mood and affect. Alert and oriented x3. Judgment and insight is normal.    Assessment/Plan   51 y.o. female presenting with the following.     1. Familial hypercholesteremia     2. Episodic tension-type headache, not intractable  butalbital/apap/caffeine -40 mg (FIORICET) -40 MG Tab    Consent for Opiate Prescription   3. Restless legs syndrome (RLS)  cyclobenzaprine (FLEXERIL) 10 mg Tab   4. Essential hypertension  propranolol CR (INDERAL LA) 120 MG CAPSULE SR 24 HR    propranolol LA (INDERAL LA) 60 MG CAPSULE SR 24 HR   5. Anxiety  propranolol LA (INDERAL LA) 60 MG CAPSULE SR 24 HR     Tension type headache  This is a chronic condition, stable.  She does reports previously saw neurologist and started amitriptyline.  She does endorse amitriptyline helps with decreasing frequency and lowering headache intensity.  She " reports tried to stopped amitriptyline and it does increase her anxiety very high.   > Continue butalbital PRN for headache relief.  Continue supportive measures for headache.  Consent for opiate prescription today.  Consider UDA/controlled substance agreement if continue use.   Obtained and reviewed patient utilization report from [facility] [place] pharmacy database on 10/8/2021 2:58 PM  prior to writing prescription for controlled substance II, III or IV per [place] bill . Based on assessment of the report, the prescription is medically necessary.     Essential hypertension  This is a chronic condition, stable.  She reports she stopped use of Kratom about 2.5 weeks ago, and reports her blood pressure has much improved since then.  She reports she used to use opiates daily (previously Norco), then switched to Kratom and has stopped.  She reports significant heart palpitations when not taking propranolol daily.  She does endorse that takes losartan as needed for SBP >140/90.  She denies cardiac symptoms today.    > Advised to take losartan daily along with propranolol as prescribed to continue good blood pressure management.  Congratulated patient on cessation from Kratom use, and advised to continue no use of it.      Familial hypercholesteremia  This is a chronic condition, not controlled.  Currently taking rosuvstatin 40 mg daily; and does follow with [facility] vascular medicine.  Last lipid panel from 5/2021 shows some improvement.   > Continue rosuvastatin 40 mg daily; continue diet and lifestyle measures.     Restless legs syndrome (RLS)  This is a new condition.  She reports having severe restless leg to bilateral knees/ankles nightly.  She reports difficulty sleeping.  She does endorse that she had clonidine from previous provider, which helped with her sleep; and her previous PCP provided her with Xanax for additional anxiety/sleep aid.  She reports having low energy as she is unable to sleep. She does endorse  trying flexeril twice in the last two weeks and reports helped significantly with her RLS and sleep.   > Trial flexeril 10 mg nightly.  Follow up in 2 months for efficacy.      Return in about 2 months (around 12/8/2021), or if symptoms worsen or fail to improve, for medication refill.    Health Maintenance: Completed    I have placed the below orders and discussed them with an approved delegating provider.  The MA is performing the below orders under the direction of Dr. Javed.    Please note that this dictation was created using voice recognition software. I have worked with consultants from the vendor as well as technical experts from [facility] to optimize the interface. I have made every reasonable attempt to correct obvious errors, but I expect that there are errors of grammar and possibly content that I did not discover before finalizing the note.    PETROS Romo  [facility]

## 2021-10-08 NOTE — ASSESSMENT & PLAN NOTE
This is a chronic condition, stable.  She reports she stopped use of Kratom about 2.5 weeks ago, and reports her blood pressure has much improved since then.  She reports she used to use opiates daily (previously Norco), then switched to Kratom and has stopped.  She reports significant heart palpitations when not taking propranolol daily.  She does endorse that takes losartan as needed for SBP >140/90.  She denies cardiac symptoms today.    > Advised to take losartan daily along with propranolol as prescribed to continue good blood pressure management.  Congratulated patient on cessation from Kratom use, and advised to continue no use of it.

## 2021-10-08 NOTE — ASSESSMENT & PLAN NOTE
This is a chronic condition, not controlled.  Currently taking rosuvstatin 40 mg daily; and does follow with [facility] vascular medicine.  Last lipid panel from 5/2021 shows some improvement.   > Continue rosuvastatin 40 mg daily; continue diet and lifestyle measures.

## 2021-11-17 ENCOUNTER — OFFICE VISIT (OUTPATIENT)
Dept: MEDICAL GROUP | Facility: PHYSICIAN GROUP | Age: 51
End: 2021-11-17
Payer: OTHER GOVERNMENT

## 2021-11-17 VITALS
DIASTOLIC BLOOD PRESSURE: 86 MMHG | SYSTOLIC BLOOD PRESSURE: 142 MMHG | BODY MASS INDEX: 28.12 KG/M2 | WEIGHT: 175 LBS | RESPIRATION RATE: 12 BRPM | HEART RATE: 76 BPM | TEMPERATURE: 98 F | HEIGHT: 66 IN | OXYGEN SATURATION: 96 %

## 2021-11-17 DIAGNOSIS — K08.89 TOOTH PAIN: ICD-10-CM

## 2021-11-17 PROCEDURE — 99213 OFFICE O/P EST LOW 20 MIN: CPT | Performed by: NURSE PRACTITIONER

## 2021-11-17 RX ORDER — GABAPENTIN 100 MG/1
200 CAPSULE ORAL 2 TIMES DAILY
Qty: 60 CAPSULE | Refills: 0 | Status: SHIPPED | OUTPATIENT
Start: 2021-11-17 | End: 2021-12-08

## 2021-11-17 ASSESSMENT — FIBROSIS 4 INDEX: FIB4 SCORE: 0.69

## 2021-11-17 NOTE — PROGRESS NOTES
"Subjective  Chief Complaint  Chief Complaint   Patient presents with   • Follow-Up     nerve pain jaw area      History of Present Illness  Asher presents today with the following.  No problems updated.  Past Medical History    Allergies: Patient has no known allergies.  Past Medical History:   Diagnosis Date   • Cancer (HCC)     back   • Head ache    • Hyperlipidemia    • Hypertension    • Migraine      Current Outpatient Medications Ordered in Epic   Medication Sig Dispense Refill   • gabapentin (NEURONTIN) 100 MG Cap Take 2 Capsules by mouth 2 times a day. 60 Capsule 0   • cyclobenzaprine (FLEXERIL) 10 mg Tab Take 1 Tablet by mouth at bedtime as needed for Muscle Spasms (restless leg at night). 30 Tablet 1   • propranolol CR (INDERAL LA) 120 MG CAPSULE SR 24 HR Take 1 Capsule by mouth every day. 90 Capsule 1   • propranolol LA (INDERAL LA) 60 MG CAPSULE SR 24 HR Take 1 Capsule by mouth every evening. 90 Capsule 1   • hydrOXYzine HCl (ATARAX) 50 MG Tab      • rosuvastatin (CRESTOR) 40 MG tablet Take 1 tablet by mouth every day. 30 tablet 11   • losartan (COZAAR) 100 MG Tab Take 1 Tab by mouth every day. 90 Tab 3   • amitriptyline (ELAVIL) 50 MG Tab Take 1 Tab by mouth every evening. 90 Tab 3     No current Epic-ordered facility-administered medications on file.     Review of Systems  See below.     Objective  Physical Exam  /86 (BP Location: Left arm, Patient Position: Sitting, BP Cuff Size: Adult)   Pulse 76   Temp 36.7 °C (98 °F) (Temporal)   Resp 12   Ht 1.676 m (5' 6\")   Wt 79.4 kg (175 lb)   SpO2 96%  Body mass index is 28.25 kg/m².  General: Alert, no distress, well-groomed.  Eye: Equal, round and reactive, conjunctiva clear, lids normal.  ENMT: Lips without lesions, good dentition, moist mucous membranes.  Respiratory: Unlabored respiratory effort, no cough.  Musculoskeletal: Normal gait, moves all extremities.  Psych: Alert and oriented x3, normal affect and mood.    Assessment/Plan  51 y.o. " female with the following issues.    1. Tooth pain  gabapentin (NEURONTIN) 100 MG Cap      Tooth pain  This is a new condition.  She reports in August, September had fillings and crown placed.  She reports that she has been experiencing intermittent pain, nerve/teeth pain since dental work.  She has been referred to an endodontist by her dentist on Tuesday next week, 11/23/2021, as her dentist believes she has fibromyalgia.  She reports when she was at the dentist, her pain was 9/10; today, pain intermittent comes and goes and it is manageable at this time.  She reports that she took an old prescription for gabapentin of her dog's; and she reports that her pain was very well manageable and lasted for about 4 hours.  She does also report that she took 800 mg total of ibuprofen.  She also took an additional gabapentin last night after she returned back to the dentist.  She reports she woke up this morning without pain; however, did start to come back.  Currently, her pain exists to her left jaw/TMJ area and then into her teeth.  Her appointment is at [facility].   > Discussion of fibromyalgia criterion; will review further at upcoming visit so that we may discuss further along with treatment options.   As gabapentin working effectively, start gabapentin 200 mg BID for nerve/tooth pain.  Advised to keep appointment with endodontics.     Return in about 3 weeks (around 12/8/2021), or if symptoms worsen or fail to improve, for already scheduled appointment.    I have placed the below orders and discussed them with an approved delegating provider.  The MA is performing the below orders under the direction of Dr. Javed.    Please note that this dictation was created using voice recognition software. I have worked with consultants from the vendor as well as technical experts from [employer] to optimize the interface. I have made every reasonable attempt to correct obvious errors, but I expect that there are  errors of grammar and possibly content that I did not discover before finalizing the note.    PETROS Romo  [facility]

## 2021-12-08 ENCOUNTER — OFFICE VISIT (OUTPATIENT)
Dept: MEDICAL GROUP | Facility: PHYSICIAN GROUP | Age: 51
End: 2021-12-08
Payer: OTHER GOVERNMENT

## 2021-12-08 VITALS
TEMPERATURE: 98.4 F | WEIGHT: 177 LBS | HEART RATE: 81 BPM | RESPIRATION RATE: 12 BRPM | HEIGHT: 66 IN | OXYGEN SATURATION: 98 % | SYSTOLIC BLOOD PRESSURE: 122 MMHG | BODY MASS INDEX: 28.45 KG/M2 | DIASTOLIC BLOOD PRESSURE: 86 MMHG

## 2021-12-08 DIAGNOSIS — F41.9 ANXIETY: ICD-10-CM

## 2021-12-08 DIAGNOSIS — G25.81 RESTLESS LEGS SYNDROME (RLS): ICD-10-CM

## 2021-12-08 DIAGNOSIS — M79.7 FIBROMYALGIA: ICD-10-CM

## 2021-12-08 DIAGNOSIS — I10 ESSENTIAL HYPERTENSION: ICD-10-CM

## 2021-12-08 PROCEDURE — 99214 OFFICE O/P EST MOD 30 MIN: CPT | Performed by: NURSE PRACTITIONER

## 2021-12-08 RX ORDER — PROPRANOLOL HCL 60 MG
60 CAPSULE, EXTENDED RELEASE 24HR ORAL EVERY EVENING
Qty: 90 CAPSULE | Refills: 1 | Status: SHIPPED | OUTPATIENT
Start: 2021-12-08 | End: 2022-04-28 | Stop reason: SDUPTHER

## 2021-12-08 RX ORDER — GABAPENTIN 100 MG/1
100 CAPSULE ORAL
COMMUNITY
End: 2022-01-27 | Stop reason: SDUPTHER

## 2021-12-08 ASSESSMENT — FIBROSIS 4 INDEX: FIB4 SCORE: 0.69

## 2021-12-08 NOTE — PROGRESS NOTES
Subjective  Chief Complaint  Chief Complaint   Patient presents with   • Follow-Up     History of Present Illness  Asher presents today with the following:      Widespread pain:  She reports widespread pain for several years to multiple areas of the body.  She does endorse fatigue, waking un-refreshed, cognitive concerns (mulitple thoughts, rapid thoughts changing), headache, depression.  She is present today to discuss fibromyalgia.  She does endorse allover itching, which is likely all over nerve pain.  She was seen for neurology for this concern.    RLS:  She reports that she has been taking gabapentin rather than flexeril for her restless leg.  She reports that her tooth pain went away after starting gabapentin.  She does endorse that the gabapentin 100 mg nightly has made her sleepy.  She reports that it also helps with her anxiety.  She reports overall sleeping better. She reports with 200 mg of gabapentin that she was not functional the next day.  She reports minor muscle spasms to her left jaw since; but reports that overall her pain is doing well.      Past Medical History    Allergies: Patient has no known allergies.  Past Medical History:   Diagnosis Date   • Cancer (HCC)     back   • Head ache    • Hyperlipidemia    • Hypertension    • Migraine      Current Outpatient Medications Ordered in Epic   Medication Sig Dispense Refill   • propranolol LA (INDERAL LA) 60 MG CAPSULE SR 24 HR Take 1 Capsule by mouth every evening. 90 Capsule 1   • gabapentin (NEURONTIN) 100 MG Cap Take 100 mg by mouth at bedtime.     • cyclobenzaprine (FLEXERIL) 10 mg Tab Take 1 Tablet by mouth at bedtime as needed for Muscle Spasms (restless leg at night). 30 Tablet 1   • propranolol CR (INDERAL LA) 120 MG CAPSULE SR 24 HR Take 1 Capsule by mouth every day. 90 Capsule 1   • hydrOXYzine HCl (ATARAX) 50 MG Tab      • rosuvastatin (CRESTOR) 40 MG tablet Take 1 tablet by mouth every day. 30 tablet 11   • losartan (COZAAR) 100 MG Tab  "Take 1 Tab by mouth every day. 90 Tab 3   • amitriptyline (ELAVIL) 50 MG Tab Take 1 Tab by mouth every evening. 90 Tab 3     No current Epic-ordered facility-administered medications on file.     Review of Systems  See below.     Objective  Physical Exam  /86 (BP Location: Right arm, Patient Position: Sitting, BP Cuff Size: Adult)   Pulse 81   Temp 36.9 °C (98.4 °F) (Temporal)   Resp 12   Ht 1.676 m (5' 6\")   Wt 80.3 kg (177 lb)   SpO2 98%  Body mass index is 28.57 kg/m².  General: Alert, no distress, well-groomed.  Skin: Warm, dry, good turgor, no rashes in visible areas.  Eye: Equal, round and reactive, conjunctiva clear, lids normal.  ENMT: Lips without lesions, good dentition, moist mucous membranes.  Neck: Trachea midline, no masses, no thyromegaly.  Respiratory: Unlabored respiratory effort, no cough.  Musculoskeletal: Normal gait, moves all extremities.  Neuro: Grossly non-focal.   Psych: Alert and oriented x3, normal affect and mood.    Assessment/Plan  1. Restless legs syndrome (RLS)  New condition - chronic in nature, stable.  Continue gabapentin 100 mg nightly.     2. Fibromyalgia  [employer] Rheumat[employer] preliminary diagnostic criteria completed.  Widespread pain noted to:  Neck, jaw (left), shoulder girdle (left), chest, abdomen, upper back, lower back, left hip, right hip, left lower leg, right lower leg.  Symptom severity scale: Fatigue-severe; waking unrefreshed-moderate; cognitive symptoms-slight.  Subsequently also has had pain/cramps in lower abdomen, depression, headache over the last 6 months.  Meets criteria for fibromyalgia:  No other disorder would otherwise explain pain, symptoms have been present for several years, widespread pain index of 11, and symptom severity scale of 6.  Reports gabapentin has significantly improved her pain, anxiety, RLS, sleep overall.    > Continue gabapentin 100 mg nightly.  Advised on low impact aerobic exercise.  Advised if symptoms worsen, " may consider increase dose of gabapentin and physical therapy.     3. Essential hypertension  4. Anxiety  Chronic, stable.  Requesting for refill of propranolol.    - propranolol LA (INDERAL LA) 60 MG CAPSULE SR 24 HR; Take 1 Capsule by mouth every evening.  Dispense: 90 Capsule; Refill: 1    Return in about 6 months (around 6/8/2022), or if symptoms worsen or fail to improve.    I have placed the below orders and discussed them with an approved delegating provider.  The MA is performing the below orders under the direction of Dr. Javed.    Please note that this dictation was created using voice recognition software. I have worked with consultants from the vendor as well as technical experts from [facility] to optimize the interface. I have made every reasonable attempt to correct obvious errors, but I expect that there are errors of grammar and possibly content that I did not discover before finalizing the note.    PETROS Romo  [facility]

## 2022-02-02 DIAGNOSIS — G44.219 EPISODIC TENSION-TYPE HEADACHE, NOT INTRACTABLE: ICD-10-CM

## 2022-02-02 DIAGNOSIS — F41.9 ANXIETY AND DEPRESSION: ICD-10-CM

## 2022-02-02 DIAGNOSIS — F32.A ANXIETY AND DEPRESSION: ICD-10-CM

## 2022-02-02 RX ORDER — AMITRIPTYLINE HYDROCHLORIDE 50 MG/1
50 TABLET, FILM COATED ORAL EVERY EVENING
Qty: 90 TABLET | Refills: 0 | Status: SHIPPED | OUTPATIENT
Start: 2022-02-02 | End: 2022-04-28 | Stop reason: SDUPTHER

## 2022-02-20 ENCOUNTER — OFFICE VISIT (OUTPATIENT)
Dept: URGENT CARE | Facility: PHYSICIAN GROUP | Age: 52
End: 2022-02-20
Payer: OTHER GOVERNMENT

## 2022-02-20 VITALS
BODY MASS INDEX: 27.38 KG/M2 | TEMPERATURE: 98.8 F | RESPIRATION RATE: 16 BRPM | DIASTOLIC BLOOD PRESSURE: 112 MMHG | HEART RATE: 84 BPM | HEIGHT: 66 IN | OXYGEN SATURATION: 97 % | WEIGHT: 170.4 LBS | SYSTOLIC BLOOD PRESSURE: 170 MMHG

## 2022-02-20 DIAGNOSIS — I10 ELEVATED BLOOD PRESSURE READING IN OFFICE WITH DIAGNOSIS OF HYPERTENSION: ICD-10-CM

## 2022-02-20 DIAGNOSIS — R22.0 LEFT FACIAL SWELLING: ICD-10-CM

## 2022-02-20 DIAGNOSIS — K04.7 DENTAL INFECTION: ICD-10-CM

## 2022-02-20 PROCEDURE — 99214 OFFICE O/P EST MOD 30 MIN: CPT | Performed by: PHYSICIAN ASSISTANT

## 2022-02-20 RX ORDER — AMOXICILLIN 500 MG/1
500 CAPSULE ORAL 3 TIMES DAILY
Qty: 21 CAPSULE | Refills: 0 | Status: SHIPPED | OUTPATIENT
Start: 2022-02-20 | End: 2022-02-27

## 2022-02-20 ASSESSMENT — FIBROSIS 4 INDEX: FIB4 SCORE: 0.69

## 2022-02-20 ASSESSMENT — ENCOUNTER SYMPTOMS
SORE THROAT: 0
SPUTUM PRODUCTION: 0
FEVER: 0
SHORTNESS OF BREATH: 0
DIZZINESS: 0
CHILLS: 0
VOMITING: 0
BLURRED VISION: 0
ABDOMINAL PAIN: 0
NAUSEA: 0
NECK PAIN: 0
WHEEZING: 0
HEADACHES: 1
MYALGIAS: 0
COUGH: 0

## 2022-02-20 NOTE — PROGRESS NOTES
Subjective:   Asher James is a 51 y.o. female who presents for Facial Swelling (L side of face is swollen, spasms, painful/tender, headache, x2 days )      HPI  51 y.o. female presents to urgent care with new problem to provider of left sided facial swelling and dental pain onset 2 days ago. Hx of dental caries with most recent dental procedure done about 2 months ago. No fevers. Reports associated headaches, jaw pain, and sinus pain. No congestion or cough. Hx of TMJ, presenting pain is worse with chewing gum. Denies other associated aggravating or alleviating factors.     Review of Systems   Constitutional: Negative for chills and fever.   HENT: Negative for sore throat.         Dental pain, left sided facial swelling   Eyes: Negative for blurred vision.   Respiratory: Negative for cough, sputum production, shortness of breath and wheezing.    Cardiovascular: Negative for chest pain.   Gastrointestinal: Negative for abdominal pain, nausea and vomiting.   Musculoskeletal: Negative for myalgias and neck pain.   Neurological: Positive for headaches. Negative for dizziness.       Patient Active Problem List   Diagnosis   • Melanoma (HCC)   • Tension type headache   • Familial hypercholesteremia   • Essential hypertension   • History of opiate therapy   • Thyroid nodule   • Anxiety   • Menopausal and female climacteric states   • Restless legs syndrome (RLS)     Past Surgical History:   Procedure Laterality Date   • TONSILLECTOMY       Social History     Tobacco Use   • Smoking status: Former Smoker     Packs/day: 0.00   • Smokeless tobacco: Never Used   • Tobacco comment: former light smoker; quit when brother had bypass   Vaping Use   • Vaping Use: Never used   Substance Use Topics   • Alcohol use: Yes     Comment: Rarely       Family History   Problem Relation Age of Onset   • Hyperlipidemia Mother    • Heart Disease Sister    • Hypertension Sister    • Hyperlipidemia Sister    • Hyperlipidemia  "Brother    • Heart Disease Brother    • Hypertension Brother    • Cancer Neg Hx    • Diabetes Neg Hx    • Stroke Neg Hx       (Allergies, Medications, & Tobacco/Substance Use were reconciled by the Medical Assistant and reviewed by myself. The family history is prepopulated)     Objective:     BP (!) 170/112 (BP Location: Left arm, Patient Position: Sitting, BP Cuff Size: Adult)   Pulse 84   Temp 37.1 °C (98.8 °F) (Temporal)   Resp 16   Ht 1.676 m (5' 6\")   Wt 77.3 kg (170 lb 6.4 oz)   SpO2 97%   BMI 27.50 kg/m²     Physical Exam  Vitals reviewed.   Constitutional:       General: She is not in acute distress.     Appearance: Normal appearance. She is well-developed. She is not ill-appearing.   HENT:      Head: Normocephalic and atraumatic.      Nose: Nose normal.      Mouth/Throat:      Mouth: Mucous membranes are moist.      Pharynx: Oropharynx is clear.     Eyes:      Conjunctiva/sclera: Conjunctivae normal.   Cardiovascular:      Rate and Rhythm: Normal rate.   Pulmonary:      Effort: Pulmonary effort is normal. No respiratory distress.   Musculoskeletal:      Cervical back: Normal range of motion and neck supple.   Skin:     General: Skin is warm and dry.   Neurological:      Mental Status: She is alert and oriented to person, place, and time.   Psychiatric:         Mood and Affect: Mood normal.         Behavior: Behavior normal.         Thought Content: Thought content normal.         Judgment: Judgment normal.         Assessment/Plan:     1. Dental infection  amoxicillin (AMOXIL) 500 MG Cap   2. Left facial swelling     3. Elevated blood pressure reading in office with diagnosis of hypertension       Recommend NSAIDs for pain and inflammation. Take entire course of antibiotics as directed. Follow up with dentistry for definitive treatment.      Differential diagnosis, natural history, supportive care, and indications for immediate follow-up discussed.    Advised the patient to follow-up with the primary " care physician for recheck, reevaluation, and consideration of further management.  Patient verbalized understanding of treatment plan and has no further questions regarding care.     Hx of hypertension. Your blood pressure is elevated here in Urgent Care. Please monitor your blood pressure over the next several days. If your blood pressure continues to be 120/80 or higher please contact your physician for blood pressure management. Discussed ED precautions including HA, visual disturbances, or focal neurological deficits.       I personally reviewed prior external notes and test results pertinent to today's visit.   Please note that this dictation was created using voice recognition software. I have made a reasonable attempt to correct obvious errors, but I expect that there are errors of grammar and possibly content that I did not discover before finalizing the note.    This note was electronically signed by Alaina Abarca PA-C

## 2022-04-28 ENCOUNTER — PATIENT MESSAGE (OUTPATIENT)
Dept: MEDICAL GROUP | Facility: PHYSICIAN GROUP | Age: 52
End: 2022-04-28
Payer: OTHER GOVERNMENT

## 2022-04-28 DIAGNOSIS — I10 ESSENTIAL HYPERTENSION: ICD-10-CM

## 2022-04-28 DIAGNOSIS — F41.9 ANXIETY: ICD-10-CM

## 2022-04-28 DIAGNOSIS — F41.9 ANXIETY AND DEPRESSION: ICD-10-CM

## 2022-04-28 DIAGNOSIS — F32.A ANXIETY AND DEPRESSION: ICD-10-CM

## 2022-04-28 DIAGNOSIS — G44.219 EPISODIC TENSION-TYPE HEADACHE, NOT INTRACTABLE: ICD-10-CM

## 2022-04-28 RX ORDER — AMITRIPTYLINE HYDROCHLORIDE 50 MG/1
50 TABLET, FILM COATED ORAL EVERY EVENING
Qty: 90 TABLET | Refills: 0 | Status: SHIPPED | OUTPATIENT
Start: 2022-04-28 | End: 2022-08-30 | Stop reason: SDUPTHER

## 2022-05-02 RX ORDER — PROPRANOLOL HCL 60 MG
60 CAPSULE, EXTENDED RELEASE 24HR ORAL EVERY EVENING
Qty: 90 CAPSULE | Refills: 1 | Status: SHIPPED | OUTPATIENT
Start: 2022-05-02 | End: 2022-11-08

## 2022-05-02 RX ORDER — PROPRANOLOL HYDROCHLORIDE 120 MG/1
120 CAPSULE, EXTENDED RELEASE ORAL DAILY
Qty: 90 CAPSULE | Refills: 1 | Status: SHIPPED | OUTPATIENT
Start: 2022-05-02 | End: 2022-08-30

## 2022-07-12 ENCOUNTER — TELEPHONE (OUTPATIENT)
Dept: SCHEDULING | Facility: IMAGING CENTER | Age: 52
End: 2022-07-12

## 2022-07-18 PROCEDURE — 83690 ASSAY OF LIPASE: CPT

## 2022-07-18 PROCEDURE — 80053 COMPREHEN METABOLIC PANEL: CPT

## 2022-07-18 PROCEDURE — 84703 CHORIONIC GONADOTROPIN ASSAY: CPT

## 2022-07-18 PROCEDURE — 85025 COMPLETE CBC W/AUTO DIFF WBC: CPT

## 2022-07-18 PROCEDURE — 99284 EMERGENCY DEPT VISIT MOD MDM: CPT

## 2022-07-18 ASSESSMENT — FIBROSIS 4 INDEX: FIB4 SCORE: 0.69

## 2022-07-19 ENCOUNTER — HOSPITAL ENCOUNTER (EMERGENCY)
Facility: MEDICAL CENTER | Age: 52
End: 2022-07-19
Attending: EMERGENCY MEDICINE
Payer: OTHER GOVERNMENT

## 2022-07-19 VITALS
RESPIRATION RATE: 20 BRPM | TEMPERATURE: 97 F | DIASTOLIC BLOOD PRESSURE: 126 MMHG | BODY MASS INDEX: 26.01 KG/M2 | WEIGHT: 161.82 LBS | OXYGEN SATURATION: 100 % | SYSTOLIC BLOOD PRESSURE: 179 MMHG | HEART RATE: 75 BPM | HEIGHT: 66 IN

## 2022-07-19 DIAGNOSIS — R10.9 FLANK PAIN: ICD-10-CM

## 2022-07-19 DIAGNOSIS — R19.7 DIARRHEA, UNSPECIFIED TYPE: ICD-10-CM

## 2022-07-19 DIAGNOSIS — R11.2 NAUSEA AND VOMITING, INTRACTABILITY OF VOMITING NOT SPECIFIED, UNSPECIFIED VOMITING TYPE: ICD-10-CM

## 2022-07-19 LAB
ALBUMIN SERPL BCP-MCNC: 4.9 G/DL (ref 3.2–4.9)
ALBUMIN/GLOB SERPL: 1.6 G/DL
ALP SERPL-CCNC: 127 U/L (ref 30–99)
ALT SERPL-CCNC: 8 U/L (ref 2–50)
ANION GAP SERPL CALC-SCNC: 16 MMOL/L (ref 7–16)
APPEARANCE UR: CLEAR
AST SERPL-CCNC: 17 U/L (ref 12–45)
BACTERIA #/AREA URNS HPF: NEGATIVE /HPF
BASOPHILS # BLD AUTO: 0.6 % (ref 0–1.8)
BASOPHILS # BLD: 0.08 K/UL (ref 0–0.12)
BILIRUB SERPL-MCNC: 0.3 MG/DL (ref 0.1–1.5)
BILIRUB UR QL STRIP.AUTO: NEGATIVE
BUN SERPL-MCNC: 21 MG/DL (ref 8–22)
CALCIUM SERPL-MCNC: 9.9 MG/DL (ref 8.5–10.5)
CHLORIDE SERPL-SCNC: 102 MMOL/L (ref 96–112)
CO2 SERPL-SCNC: 16 MMOL/L (ref 20–33)
COLOR UR: YELLOW
CREAT SERPL-MCNC: 0.9 MG/DL (ref 0.5–1.4)
EOSINOPHIL # BLD AUTO: 0.2 K/UL (ref 0–0.51)
EOSINOPHIL NFR BLD: 1.5 % (ref 0–6.9)
EPI CELLS #/AREA URNS HPF: NEGATIVE /HPF
ERYTHROCYTE [DISTWIDTH] IN BLOOD BY AUTOMATED COUNT: 43.3 FL (ref 35.9–50)
GFR SERPLBLD CREATININE-BSD FMLA CKD-EPI: 76 ML/MIN/1.73 M 2
GLOBULIN SER CALC-MCNC: 3 G/DL (ref 1.9–3.5)
GLUCOSE SERPL-MCNC: 111 MG/DL (ref 65–99)
GLUCOSE UR STRIP.AUTO-MCNC: NEGATIVE MG/DL
HCG SERPL QL: NEGATIVE
HCT VFR BLD AUTO: 41.9 % (ref 37–47)
HGB BLD-MCNC: 14 G/DL (ref 12–16)
HYALINE CASTS #/AREA URNS LPF: ABNORMAL /LPF
IMM GRANULOCYTES # BLD AUTO: 0.06 K/UL (ref 0–0.11)
IMM GRANULOCYTES NFR BLD AUTO: 0.5 % (ref 0–0.9)
KETONES UR STRIP.AUTO-MCNC: 15 MG/DL
LEUKOCYTE ESTERASE UR QL STRIP.AUTO: NEGATIVE
LIPASE SERPL-CCNC: 34 U/L (ref 11–82)
LYMPHOCYTES # BLD AUTO: 2.65 K/UL (ref 1–4.8)
LYMPHOCYTES NFR BLD: 20 % (ref 22–41)
MCH RBC QN AUTO: 30.6 PG (ref 27–33)
MCHC RBC AUTO-ENTMCNC: 33.4 G/DL (ref 33.6–35)
MCV RBC AUTO: 91.7 FL (ref 81.4–97.8)
MICRO URNS: ABNORMAL
MONOCYTES # BLD AUTO: 0.67 K/UL (ref 0–0.85)
MONOCYTES NFR BLD AUTO: 5.1 % (ref 0–13.4)
NEUTROPHILS # BLD AUTO: 9.59 K/UL (ref 2–7.15)
NEUTROPHILS NFR BLD: 72.3 % (ref 44–72)
NITRITE UR QL STRIP.AUTO: NEGATIVE
NRBC # BLD AUTO: 0 K/UL
NRBC BLD-RTO: 0 /100 WBC
PH UR STRIP.AUTO: 6.5 [PH] (ref 5–8)
PLATELET # BLD AUTO: 263 K/UL (ref 164–446)
PMV BLD AUTO: 11.4 FL (ref 9–12.9)
POTASSIUM SERPL-SCNC: 4.1 MMOL/L (ref 3.6–5.5)
PROT SERPL-MCNC: 7.9 G/DL (ref 6–8.2)
PROT UR QL STRIP: NEGATIVE MG/DL
RBC # BLD AUTO: 4.57 M/UL (ref 4.2–5.4)
RBC # URNS HPF: ABNORMAL /HPF
RBC UR QL AUTO: ABNORMAL
SODIUM SERPL-SCNC: 134 MMOL/L (ref 135–145)
SP GR UR STRIP.AUTO: 1.01
UROBILINOGEN UR STRIP.AUTO-MCNC: 0.2 MG/DL
WBC # BLD AUTO: 13.3 K/UL (ref 4.8–10.8)
WBC #/AREA URNS HPF: ABNORMAL /HPF

## 2022-07-19 PROCEDURE — 700111 HCHG RX REV CODE 636 W/ 250 OVERRIDE (IP): Performed by: EMERGENCY MEDICINE

## 2022-07-19 PROCEDURE — 81001 URINALYSIS AUTO W/SCOPE: CPT

## 2022-07-19 PROCEDURE — 96372 THER/PROPH/DIAG INJ SC/IM: CPT

## 2022-07-19 RX ORDER — NAPROXEN 500 MG/1
500 TABLET ORAL 2 TIMES DAILY WITH MEALS
Qty: 15 TABLET | Refills: 0 | Status: SHIPPED
Start: 2022-07-19 | End: 2022-08-30

## 2022-07-19 RX ORDER — KETOROLAC TROMETHAMINE 30 MG/ML
15 INJECTION, SOLUTION INTRAMUSCULAR; INTRAVENOUS ONCE
Status: COMPLETED | OUTPATIENT
Start: 2022-07-19 | End: 2022-07-19

## 2022-07-19 RX ORDER — ONDANSETRON 4 MG/1
4 TABLET, ORALLY DISINTEGRATING ORAL EVERY 6 HOURS PRN
Qty: 10 TABLET | Refills: 0 | Status: SHIPPED
Start: 2022-07-19 | End: 2022-07-24

## 2022-07-19 RX ORDER — LOPERAMIDE HYDROCHLORIDE 2 MG/1
2 CAPSULE ORAL 4 TIMES DAILY PRN
Qty: 30 CAPSULE | Refills: 0 | Status: SHIPPED
Start: 2022-07-19 | End: 2022-08-30

## 2022-07-19 RX ORDER — CYCLOBENZAPRINE HCL 10 MG
10 TABLET ORAL 3 TIMES DAILY PRN
Qty: 30 TABLET | Refills: 0 | Status: SHIPPED
Start: 2022-07-19 | End: 2022-07-28 | Stop reason: SDUPTHER

## 2022-07-19 RX ADMIN — KETOROLAC TROMETHAMINE 15 MG: 30 INJECTION, SOLUTION INTRAMUSCULAR; INTRAVENOUS at 02:34

## 2022-07-19 NOTE — ED TRIAGE NOTES
"Chief Complaint   Patient presents with   • Nausea/Vomiting/Diarrhea   • Abdominal Pain   • Flank Pain     Left side.     Pt arrives with multiple complaints. Pt states she ate dinner consisting of fish and salad at 1700. At 1900 she began having N/V/D. Pt states she either has food poisoning or is having diarrhea from a sugar substitute she used.   Pt also c/o of left flank pain. Described as a muscle cramp. States she thinks her back went out or she has a kidney stone.   Pt also complains of pressure on her urethra. States she may have a UTI or the pain could be from the diarrhea. Episodes of vomiting in ambulance.   4mg Zofran ODT given by [facility].    BP (!) 169/110   Pulse 68   Temp (!) 35.6 °C (96 °F) (Temporal)   Resp 18   Ht 1.676 m (5' 6\")   Wt 73.4 kg (161 lb 13.1 oz)   SpO2 97%   BMI 26.12 kg/m²     "

## 2022-07-19 NOTE — ED PROVIDER NOTES
ED Provider Note    ER Provider Note         CHIEF COMPLAINT  Chief Complaint   Patient presents with   • Nausea/Vomiting/Diarrhea   • Abdominal Pain   • Flank Pain     Left side.       HPI  Asher James is a 51 y.o. female who presents to the Emergency Department with some episodes of nausea as well as vomiting and diarrhea.  This is after she feels like she ate something weird.  The patient did mention some back pain that radiates to her flank this is now stopped.  She says it feels like a muscle cramp.  She says she has had kidney stones in the past and it feels nothing like this and currently she has no pain.  She denies any fevers or chills.  She does mention some dysuria.  She denies any chest pain in relation to this.  Her diarrhea was not bloody and not dark.  Her vomitus was nonbloody or bilious.  She describes no abdominal pain at this time.    REVIEW OF SYSTEMS  See HPI for further details. All other systems are negative.     PAST MEDICAL HISTORY   has a past medical history of Cancer (HCC), Head ache, Hyperlipidemia, Hypertension, and Migraine.    SURGICAL HISTORY   has a past surgical history that includes tonsillectomy.    SOCIAL HISTORY  Social History     Tobacco Use   • Smoking status: Former Smoker     Packs/day: 0.00   • Smokeless tobacco: Never Used   • Tobacco comment: former light smoker; quit when brother had bypass   Vaping Use   • Vaping Use: Never used   Substance Use Topics   • Alcohol use: Yes     Comment: Rarely       Social History     Substance and Sexual Activity   Drug Use Not on file    Comment: kratom - stopped 2 weeks ago as of 10/8/2021       FAMILY HISTORY  Family History   Problem Relation Age of Onset   • Hyperlipidemia Mother    • Heart Disease Sister    • Hypertension Sister    • Hyperlipidemia Sister    • Hyperlipidemia Brother    • Heart Disease Brother    • Hypertension Brother    • Cancer Neg Hx    • Diabetes Neg Hx    • Stroke Neg Hx        CURRENT  "MEDICATIONS  Home Medications     Reviewed by Heather Taylor R.N. (Registered Nurse) on 07/18/22 at 2311  Med List Status: Not Addressed   Medication Last Dose Status   amitriptyline (ELAVIL) 50 MG Tab  Active   cyclobenzaprine (FLEXERIL) 10 mg Tab  Active   gabapentin (NEURONTIN) 100 MG Cap  Active   hydrOXYzine HCl (ATARAX) 50 MG Tab  Active   losartan (COZAAR) 100 MG Tab  Active   propranolol CR (INDERAL LA) 120 MG CAPSULE SR 24 HR  Active   propranolol LA (INDERAL LA) 60 MG CAPSULE SR 24 HR  Active   rosuvastatin (CRESTOR) 40 MG tablet  Active                ALLERGIES  No Known Allergies    PHYSICAL EXAM  VITAL SIGNS: BP (!) 179/126   Pulse 75   Temp 36.1 °C (97 °F) (Temporal)   Resp 20   Ht 1.676 m (5' 6\")   Wt 73.4 kg (161 lb 13.1 oz)   SpO2 100%   BMI 26.12 kg/m²      Constitutional: Alert in no apparent distress.  HENT: No signs of trauma, Bilateral external ears normal, Nose normal.   Eyes: Pupils are equal, Conjunctiva normal, Non-icteric.   Neck: Normal range of motion, No tenderness, Supple, No stridor.   Lymphatic: No lymphadenopathy noted.   Cardiovascular: Regular rate and rhythm, nondisplaced PMI  Thorax & Lungs: No respiratory distress,  No chest tenderness.   Abdomen: Bowel sounds normal, Soft, No tenderness, No masses, No pulsatile masses. No peritoneal signs.  Skin: Warm, Dry, No erythema, No rash.   Back: No bony tenderness, No CVA tenderness.   Extremities: Intact distal pulses, No edema, No tenderness, No cyanosis.  Musculoskeletal: Good range of motion in all major joints. No tenderness to palpation or major deformities noted.   Neurologic: Alert , Normal motor function, Normal sensory function, No focal deficits noted.   Psychiatric: Affect normal, Judgment normal, Mood normal.     DIAGNOSTIC STUDIES / PROCEDURES        LABS  Labs Reviewed   CBC WITH DIFFERENTIAL - Abnormal; Notable for the following components:       Result Value    WBC 13.3 (*)     MCHC 33.4 (*)     " Neutrophils-Polys 72.30 (*)     Lymphocytes 20.00 (*)     Neutrophils (Absolute) 9.59 (*)     All other components within normal limits   COMP METABOLIC PANEL - Abnormal; Notable for the following components:    Sodium 134 (*)     Co2 16 (*)     Glucose 111 (*)     Alkaline Phosphatase 127 (*)     All other components within normal limits   URINALYSIS,CULTURE IF INDICATED - Abnormal; Notable for the following components:    Ketones 15 (*)     Occult Blood Trace (*)     All other components within normal limits    Narrative:     Indication for culture:->Patient WITHOUT an indwelling Chaparro  catheter in place with new onset of Dysuria, Frequency,  Urgency, and/or Suprapubic pain   URINE MICROSCOPIC (W/UA) - Abnormal; Notable for the following components:    RBC 2-5 (*)     All other components within normal limits    Narrative:     Indication for culture:->Patient WITHOUT an indwelling Chaparro  catheter in place with new onset of Dysuria, Frequency,  Urgency, and/or Suprapubic pain   LIPASE   HCG QUAL SERUM   ESTIMATED GFR       All labs reviewed by me.    RADIOLOGY  No orders to display        The radiologist's interpretation of all radiological studies have been reviewed by me.    COURSE & MEDICAL DECISION MAKING  Pertinent Labs & Imaging studies reviewed. (See chart for details)    This is a 51 y.o. female that presents with nausea as well as vomiting and diarrhea.  At this time the patient is in no pain.  She did have some back pain and I think this is likely musculoskeletal.  Is not tender at this time.  Do not believe she has a kidney stone given she has had some in the past and this feels very mild and is now resolved.  This could be pyelonephritis.  We will get a urinalysis.  We will evaluate the patient for pregnancy as well as pancreatitis and urinary tract infection.  We will reassess after this.      1:32 AM - Patient seen and examined at bedside.     Patient is not pregnant.  The patient does have some trace  blood in the urine.  An ultrasound was performed at bedside and shows no hydronephrosis.  This could be a small kidney stone versus musculoskeletal issue.  There is no infection in the urine.  I did send her home with nausea medicine as well as pain medication and medication to help with muscle spasms.  I will give her strict return precautions and follow-up.          FINAL IMPRESSION  1. Nausea and vomiting, intractability of vomiting not specified, unspecified vomiting type    2. Flank pain    3. Diarrhea, unspecified type              Electronically signed by: Jerry Lama M.D., 7/19/2022

## 2022-07-21 ENCOUNTER — PATIENT MESSAGE (OUTPATIENT)
Dept: MEDICAL GROUP | Facility: PHYSICIAN GROUP | Age: 52
End: 2022-07-21
Payer: OTHER GOVERNMENT

## 2022-07-21 DIAGNOSIS — F41.9 ANXIETY AND DEPRESSION: ICD-10-CM

## 2022-07-21 DIAGNOSIS — G44.219 EPISODIC TENSION-TYPE HEADACHE, NOT INTRACTABLE: ICD-10-CM

## 2022-07-21 DIAGNOSIS — Z76.0 MEDICATION REFILL: ICD-10-CM

## 2022-07-21 DIAGNOSIS — R10.9 FLANK PAIN: ICD-10-CM

## 2022-07-21 DIAGNOSIS — F32.A ANXIETY AND DEPRESSION: ICD-10-CM

## 2022-07-22 ENCOUNTER — OFFICE VISIT (OUTPATIENT)
Dept: URGENT CARE | Facility: PHYSICIAN GROUP | Age: 52
End: 2022-07-22
Payer: OTHER GOVERNMENT

## 2022-07-22 VITALS
OXYGEN SATURATION: 97 % | HEART RATE: 83 BPM | DIASTOLIC BLOOD PRESSURE: 80 MMHG | SYSTOLIC BLOOD PRESSURE: 122 MMHG | BODY MASS INDEX: 25.88 KG/M2 | TEMPERATURE: 99.7 F | HEIGHT: 66 IN | RESPIRATION RATE: 16 BRPM | WEIGHT: 161 LBS

## 2022-07-22 DIAGNOSIS — M54.50 ACUTE LOW BACK PAIN WITHOUT SCIATICA, UNSPECIFIED BACK PAIN LATERALITY: ICD-10-CM

## 2022-07-22 DIAGNOSIS — G43.809 OTHER MIGRAINE WITHOUT STATUS MIGRAINOSUS, NOT INTRACTABLE: ICD-10-CM

## 2022-07-22 LAB
APPEARANCE UR: CLEAR
BILIRUB UR STRIP-MCNC: NEGATIVE MG/DL
COLOR UR AUTO: YELLOW
GLUCOSE UR STRIP.AUTO-MCNC: NEGATIVE MG/DL
KETONES UR STRIP.AUTO-MCNC: NEGATIVE MG/DL
LEUKOCYTE ESTERASE UR QL STRIP.AUTO: NEGATIVE
NITRITE UR QL STRIP.AUTO: NEGATIVE
PH UR STRIP.AUTO: 5.5 [PH] (ref 5–8)
PROT UR QL STRIP: NEGATIVE MG/DL
RBC UR QL AUTO: NEGATIVE
SP GR UR STRIP.AUTO: 1.02
UROBILINOGEN UR STRIP-MCNC: 0.2 MG/DL

## 2022-07-22 PROCEDURE — 81002 URINALYSIS NONAUTO W/O SCOPE: CPT | Performed by: NURSE PRACTITIONER

## 2022-07-22 PROCEDURE — 99214 OFFICE O/P EST MOD 30 MIN: CPT | Performed by: NURSE PRACTITIONER

## 2022-07-22 RX ORDER — BUTALBITAL, ACETAMINOPHEN AND CAFFEINE 300; 40; 50 MG/1; MG/1; MG/1
1 CAPSULE ORAL EVERY 4 HOURS PRN
Qty: 30 CAPSULE | Refills: 0 | Status: SHIPPED | OUTPATIENT
Start: 2022-07-22 | End: 2022-07-27

## 2022-07-22 ASSESSMENT — ENCOUNTER SYMPTOMS
FOCAL WEAKNESS: 0
ABDOMINAL PAIN: 0
DIARRHEA: 0
SENSORY CHANGE: 0
PALPITATIONS: 0
FEVER: 0
SHORTNESS OF BREATH: 0
NAUSEA: 0
BACK PAIN: 1
FLANK PAIN: 0
WEAKNESS: 0
ORTHOPNEA: 0
VOMITING: 0
DIZZINESS: 0
CHILLS: 0
CONSTIPATION: 0
TINGLING: 0
HEADACHES: 0
MYALGIAS: 1
FALLS: 0

## 2022-07-22 ASSESSMENT — FIBROSIS 4 INDEX: FIB4 SCORE: 1.21

## 2022-07-22 NOTE — PROGRESS NOTES
Subjective     Asher James is a 51 y.o. female who presents with GI Problem (Was vomiting, diarrhea, lower abdominal pressure, and passed a kidney stone, today still having a little back pain)            HPI  Patient states she was in the emergency room 3 days ago for nausea vomiting and back pain.  States it was most likely kidney stone.  They do not find anything on ultrasound.  Sent home with naproxen and Flexeril.  States she passed small black stones yesterday at home.  States still has mild low back pain.  States her primary doctor is out of town and came to urgent care for follow-up.  Denies any dysuria or hematuria at this time.  No longer has nausea vomiting or diarrhea.  [facility] [place] was unable to  her prescribed medications from the pharmacy as their system has been down.  [facility] [place] medications were not transferred to another pharmacy.  States she has had leftover Motrin 800 mg as well as Flexeril which she has used.     Patient requesting refill of her Fioricet (no codeine) as she has had severe headache from nausea vomiting and diarrhea.  Able to keep down fluids with no problems.     PMH:  has a past medical history of Cancer (HCC), Head ache, Hyperlipidemia, Hypertension, and Migraine.  MEDS:   Current Outpatient Medications:   •  acetaminophen/caffeine/butalbital 300-40-50 mg (FIORICET) -40 MG Cap capsule, Take 1 Capsule by mouth every four hours as needed for Headache for up to 5 days., Disp: 30 Capsule, Rfl: 0  •  loperamide (IMODIUM) 2 MG Cap, Take 1 Capsule by mouth 4 times a day as needed for Diarrhea., Disp: 30 Capsule, Rfl: 0  •  ondansetron (ZOFRAN ODT) 4 MG TABLET DISPERSIBLE, Take 1 Tablet by mouth every 6 hours as needed for Nausea for up to 5 days., Disp: 10 Tablet, Rfl: 0  •  cyclobenzaprine (FLEXERIL) 10 mg Tab, Take 1 Tablet by mouth 3 times a day as needed for Moderate Pain., Disp: 30 Tablet, Rfl: 0  •  naproxen (NAPROSYN) 500 MG Tab, Take 1 Tablet by mouth 2  times a day with meals., Disp: 15 Tablet, Rfl: 0  •  propranolol CR (INDERAL LA) 120 MG CAPSULE SR 24 HR, Take 1 Capsule by mouth every day., Disp: 90 Capsule, Rfl: 1  •  propranolol LA (INDERAL LA) 60 MG CAPSULE SR 24 HR, Take 1 Capsule by mouth every evening., Disp: 90 Capsule, Rfl: 1  •  amitriptyline (ELAVIL) 50 MG Tab, Take 1 Tablet by mouth every evening., Disp: 90 Tablet, Rfl: 0  •  losartan (COZAAR) 100 MG Tab, Take 1 Tablet by mouth every day., Disp: 90 Tablet, Rfl: 1  •  gabapentin (NEURONTIN) 100 MG Cap, Take 1 Capsule by mouth at bedtime., Disp: 90 Capsule, Rfl: 1  •  cyclobenzaprine (FLEXERIL) 10 mg Tab, Take 1 Tablet by mouth at bedtime as needed for Muscle Spasms (restless leg at night)., Disp: 30 Tablet, Rfl: 1  •  hydrOXYzine HCl (ATARAX) 50 MG Tab, , Disp: , Rfl:   •  rosuvastatin (CRESTOR) 40 MG tablet, Take 1 tablet by mouth every day., Disp: 30 tablet, Rfl: 11  ALLERGIES: No Known Allergies  SURGHX:   Past Surgical History:   Procedure Laterality Date   • TONSILLECTOMY       SOCHX:  reports that she has quit smoking. She smoked 0.00 packs per day. She has never used smokeless tobacco. She reports current alcohol use.  FH: Family history was reviewed, no pertinent findings to report    Review of Systems   Constitutional: Negative for chills, fever and malaise/fatigue.   Respiratory: Negative for shortness of breath.    Cardiovascular: Negative for chest pain, palpitations and orthopnea.   Gastrointestinal: Negative for abdominal pain, constipation, diarrhea, nausea and vomiting.   Genitourinary: Negative for dysuria, flank pain, frequency, hematuria and urgency.   Musculoskeletal: Positive for back pain and myalgias. Negative for falls and joint pain.   Skin: Negative for itching and rash.   Neurological: Negative for dizziness, tingling, sensory change, focal weakness, weakness and headaches.   All other systems reviewed and are negative.             Objective     /80 (BP Location: Right  "arm, Patient Position: Sitting, BP Cuff Size: Adult)   Pulse 83   Temp 37.6 °C (99.7 °F) (Temporal)   Resp 16   Ht 1.676 m (5' 6\")   Wt 73 kg (161 lb)   SpO2 97%   BMI 25.99 kg/m²      Physical Exam  Vitals reviewed.   Constitutional:       General: She is awake. She is not in acute distress.     Appearance: Normal appearance. She is well-developed. She is not ill-appearing, toxic-appearing or diaphoretic.   HENT:      Head: Normocephalic.   Cardiovascular:      Rate and Rhythm: Normal rate.   Pulmonary:      Effort: Pulmonary effort is normal.   Musculoskeletal:      Lumbar back: Normal.   Skin:     General: Skin is warm and dry.      Findings: No erythema or rash.   Neurological:      Mental Status: She is alert and oriented to person, place, and time.   Psychiatric:         Attention and Perception: Attention normal.         Mood and Affect: Mood normal.         Speech: Speech normal.         Behavior: Behavior normal. Behavior is cooperative.         Thought Content: Thought content normal.         Cognition and Memory: Cognition and memory normal.         Judgment: Judgment normal.                             Assessment & Plan        1. Acute low back pain without sciatica, unspecified back pain laterality    - POCT Urinalysis    2. Other migraine without status migrainosus, not intractable    - acetaminophen/caffeine/butalbital 300-40-50 mg (FIORICET) -40 MG Cap capsule; Take 1 Capsule by mouth every four hours as needed for Headache for up to 5 days.  Dispense: 30 Capsule; Refill: 0    -Take over the counter NSAID as needed for back discomfor-May use cool compresses for any swelling /throbbing pain and warm compresses for muscle stiffness   -May perform gentle back muscle stretches as tolerated after warm compresses to maintain mobility, avoid abdominal crunches, heavy lifting or repetitive motions  -May use Flexeril as directed when at home only due to drowsiness  -May apply topical analgesics " as needed (preferred lidocaine based topical like salon Pas)  -Perform proper body mechanics with lifting, twisting, bending and walking. Ask for assistance with heavy objects as needed   -Monitor for bowel/urination problems, hematuria, fever, increased flank/back pain- need re-evaluation  Follow up with primary care provider for further refills of headache meds on 8/30/2022    -Education provided: see above    -Return to urgent care as needed if new or worsening symptoms  -Patient expresses understanding to treatment and plan of care  -Questions were encouraged and answered  -Recommended over the counter meds were written down when requested   -Advised to follow-up with the primary care provider for recheck, reevaluation, and consideration of further management as needed     -Time spent evaluating this patient was at least 30 minutes. This time comprises of the following: preparing for visit/chart review, patient history taken into account pertinent to symptoms, patient counseling/education for needed treatment outpatient, exam/evaluation/treatment plan provided, ordering any appropriate lab/test/procedures/meds, independent interpretation of any clinic testing, medication management with any other listed medications and chart documentation.

## 2022-07-28 RX ORDER — CYCLOBENZAPRINE HCL 10 MG
10 TABLET ORAL 3 TIMES DAILY PRN
Qty: 30 TABLET | Refills: 0 | Status: SHIPPED | OUTPATIENT
Start: 2022-07-28 | End: 2023-04-23

## 2022-08-29 SDOH — ECONOMIC STABILITY: FOOD INSECURITY: WITHIN THE PAST 12 MONTHS, THE FOOD YOU BOUGHT JUST DIDN'T LAST AND YOU DIDN'T HAVE MONEY TO GET MORE.: PATIENT DECLINED

## 2022-08-29 SDOH — ECONOMIC STABILITY: TRANSPORTATION INSECURITY
IN THE PAST 12 MONTHS, HAS THE LACK OF TRANSPORTATION KEPT YOU FROM MEDICAL APPOINTMENTS OR FROM GETTING MEDICATIONS?: NO

## 2022-08-29 SDOH — ECONOMIC STABILITY: HOUSING INSECURITY: IN THE LAST 12 MONTHS, HOW MANY PLACES HAVE YOU LIVED?: 1

## 2022-08-29 SDOH — HEALTH STABILITY: MENTAL HEALTH
STRESS IS WHEN SOMEONE FEELS TENSE, NERVOUS, ANXIOUS, OR CAN'T SLEEP AT NIGHT BECAUSE THEIR MIND IS TROUBLED. HOW STRESSED ARE YOU?: RATHER MUCH

## 2022-08-29 SDOH — ECONOMIC STABILITY: HOUSING INSECURITY
IN THE LAST 12 MONTHS, WAS THERE A TIME WHEN YOU DID NOT HAVE A STEADY PLACE TO SLEEP OR SLEPT IN A SHELTER (INCLUDING NOW)?: NO

## 2022-08-29 SDOH — ECONOMIC STABILITY: FOOD INSECURITY: WITHIN THE PAST 12 MONTHS, YOU WORRIED THAT YOUR FOOD WOULD RUN OUT BEFORE YOU GOT MONEY TO BUY MORE.: PATIENT DECLINED

## 2022-08-29 SDOH — HEALTH STABILITY: PHYSICAL HEALTH: ON AVERAGE, HOW MANY MINUTES DO YOU ENGAGE IN EXERCISE AT THIS LEVEL?: 40 MIN

## 2022-08-29 SDOH — ECONOMIC STABILITY: INCOME INSECURITY: IN THE LAST 12 MONTHS, WAS THERE A TIME WHEN YOU WERE NOT ABLE TO PAY THE MORTGAGE OR RENT ON TIME?: PATIENT REFUSED

## 2022-08-29 SDOH — HEALTH STABILITY: PHYSICAL HEALTH: ON AVERAGE, HOW MANY DAYS PER WEEK DO YOU ENGAGE IN MODERATE TO STRENUOUS EXERCISE (LIKE A BRISK WALK)?: 4 DAYS

## 2022-08-29 SDOH — ECONOMIC STABILITY: TRANSPORTATION INSECURITY
IN THE PAST 12 MONTHS, HAS LACK OF RELIABLE TRANSPORTATION KEPT YOU FROM MEDICAL APPOINTMENTS, MEETINGS, WORK OR FROM GETTING THINGS NEEDED FOR DAILY LIVING?: NO

## 2022-08-29 SDOH — ECONOMIC STABILITY: INCOME INSECURITY: HOW HARD IS IT FOR YOU TO PAY FOR THE VERY BASICS LIKE FOOD, HOUSING, MEDICAL CARE, AND HEATING?: PATIENT DECLINED

## 2022-08-29 SDOH — ECONOMIC STABILITY: HOUSING INSECURITY
IN THE LAST 12 MONTHS, WAS THERE A TIME WHEN YOU DID NOT HAVE A STEADY PLACE TO SLEEP OR SLEPT IN A SHELTER (INCLUDING NOW)?: PATIENT REFUSED

## 2022-08-29 SDOH — ECONOMIC STABILITY: TRANSPORTATION INSECURITY
IN THE PAST 12 MONTHS, HAS LACK OF TRANSPORTATION KEPT YOU FROM MEETINGS, WORK, OR FROM GETTING THINGS NEEDED FOR DAILY LIVING?: NO

## 2022-08-29 ASSESSMENT — SOCIAL DETERMINANTS OF HEALTH (SDOH)
HOW HARD IS IT FOR YOU TO PAY FOR THE VERY BASICS LIKE FOOD, HOUSING, MEDICAL CARE, AND HEATING?: PATIENT DECLINED
HOW OFTEN DO YOU GET TOGETHER WITH FRIENDS OR RELATIVES?: NEVER
DO YOU BELONG TO ANY CLUBS OR ORGANIZATIONS SUCH AS CHURCH GROUPS UNIONS, FRATERNAL OR ATHLETIC GROUPS, OR SCHOOL GROUPS?: YES
DO YOU BELONG TO ANY CLUBS OR ORGANIZATIONS SUCH AS CHURCH GROUPS UNIONS, FRATERNAL OR ATHLETIC GROUPS, OR SCHOOL GROUPS?: YES
HOW OFTEN DO YOU ATTEND CHURCH OR RELIGIOUS SERVICES?: NEVER
HOW OFTEN DO YOU ATTENT MEETINGS OF THE CLUB OR ORGANIZATION YOU BELONG TO?: NEVER
HOW OFTEN DO YOU HAVE SIX OR MORE DRINKS ON ONE OCCASION: NEVER
IN A TYPICAL WEEK, HOW MANY TIMES DO YOU TALK ON THE PHONE WITH FAMILY, FRIENDS, OR NEIGHBORS?: NEVER
HOW OFTEN DO YOU HAVE A DRINK CONTAINING ALCOHOL: MONTHLY OR LESS
HOW OFTEN DO YOU ATTENT MEETINGS OF THE CLUB OR ORGANIZATION YOU BELONG TO?: NEVER
HOW MANY DRINKS CONTAINING ALCOHOL DO YOU HAVE ON A TYPICAL DAY WHEN YOU ARE DRINKING: 1 OR 2
HOW OFTEN DO YOU GET TOGETHER WITH FRIENDS OR RELATIVES?: NEVER
IN A TYPICAL WEEK, HOW MANY TIMES DO YOU TALK ON THE PHONE WITH FAMILY, FRIENDS, OR NEIGHBORS?: NEVER
HOW OFTEN DO YOU ATTEND CHURCH OR RELIGIOUS SERVICES?: NEVER
WITHIN THE PAST 12 MONTHS, YOU WORRIED THAT YOUR FOOD WOULD RUN OUT BEFORE YOU GOT THE MONEY TO BUY MORE: PATIENT DECLINED

## 2022-08-29 ASSESSMENT — LIFESTYLE VARIABLES
HOW OFTEN DO YOU HAVE SIX OR MORE DRINKS ON ONE OCCASION: NEVER
HOW MANY STANDARD DRINKS CONTAINING ALCOHOL DO YOU HAVE ON A TYPICAL DAY: 1 OR 2
HOW OFTEN DO YOU HAVE A DRINK CONTAINING ALCOHOL: MONTHLY OR LESS
SKIP TO QUESTIONS 9-10: 1
AUDIT-C TOTAL SCORE: 1

## 2022-08-30 ENCOUNTER — OFFICE VISIT (OUTPATIENT)
Dept: MEDICAL GROUP | Facility: PHYSICIAN GROUP | Age: 52
End: 2022-08-30
Payer: OTHER GOVERNMENT

## 2022-08-30 VITALS
HEART RATE: 70 BPM | HEIGHT: 66 IN | TEMPERATURE: 98.1 F | DIASTOLIC BLOOD PRESSURE: 106 MMHG | SYSTOLIC BLOOD PRESSURE: 144 MMHG | WEIGHT: 157 LBS | BODY MASS INDEX: 25.23 KG/M2 | OXYGEN SATURATION: 99 %

## 2022-08-30 DIAGNOSIS — N95.1 MENOPAUSAL VAGINAL DRYNESS: ICD-10-CM

## 2022-08-30 DIAGNOSIS — Z12.31 ENCOUNTER FOR SCREENING MAMMOGRAM FOR BREAST CANCER: ICD-10-CM

## 2022-08-30 DIAGNOSIS — Z76.89 ENCOUNTER TO ESTABLISH CARE: ICD-10-CM

## 2022-08-30 DIAGNOSIS — E55.9 VITAMIN D DEFICIENCY: ICD-10-CM

## 2022-08-30 DIAGNOSIS — E78.01 FAMILIAL HYPERCHOLESTEREMIA: ICD-10-CM

## 2022-08-30 DIAGNOSIS — I10 ESSENTIAL HYPERTENSION: ICD-10-CM

## 2022-08-30 DIAGNOSIS — N94.10 DYSPAREUNIA IN FEMALE: ICD-10-CM

## 2022-08-30 DIAGNOSIS — E87.1 HYPONATREMIA: ICD-10-CM

## 2022-08-30 DIAGNOSIS — R74.8 ELEVATED ALKALINE PHOSPHATASE LEVEL: ICD-10-CM

## 2022-08-30 DIAGNOSIS — G44.219 EPISODIC TENSION-TYPE HEADACHE, NOT INTRACTABLE: ICD-10-CM

## 2022-08-30 DIAGNOSIS — Z12.11 ENCOUNTER FOR SCREENING FOR MALIGNANT NEOPLASM OF COLON: ICD-10-CM

## 2022-08-30 DIAGNOSIS — Z85.820 HISTORY OF MELANOMA: ICD-10-CM

## 2022-08-30 DIAGNOSIS — F32.A ANXIETY AND DEPRESSION: ICD-10-CM

## 2022-08-30 DIAGNOSIS — F41.9 ANXIETY AND DEPRESSION: ICD-10-CM

## 2022-08-30 PROBLEM — Z87.442 HISTORY OF KIDNEY STONES: Status: ACTIVE | Noted: 2022-08-30

## 2022-08-30 PROCEDURE — 99214 OFFICE O/P EST MOD 30 MIN: CPT | Performed by: STUDENT IN AN ORGANIZED HEALTH CARE EDUCATION/TRAINING PROGRAM

## 2022-08-30 RX ORDER — AMITRIPTYLINE HYDROCHLORIDE 50 MG/1
50 TABLET, FILM COATED ORAL EVERY EVENING
Qty: 90 TABLET | Refills: 3 | Status: SHIPPED | OUTPATIENT
Start: 2022-08-30 | End: 2023-07-13

## 2022-08-30 RX ORDER — LOSARTAN POTASSIUM 100 MG/1
100 TABLET ORAL DAILY
Qty: 90 TABLET | Refills: 3 | Status: SHIPPED | OUTPATIENT
Start: 2022-08-30 | End: 2023-09-07 | Stop reason: SDUPTHER

## 2022-08-30 RX ORDER — AMLODIPINE BESYLATE 2.5 MG/1
2.5 TABLET ORAL DAILY
Qty: 90 TABLET | Refills: 0 | Status: SHIPPED | OUTPATIENT
Start: 2022-08-30 | End: 2022-09-30

## 2022-08-30 ASSESSMENT — PATIENT HEALTH QUESTIONNAIRE - PHQ9: CLINICAL INTERPRETATION OF PHQ2 SCORE: 0

## 2022-08-30 ASSESSMENT — FIBROSIS 4 INDEX: FIB4 SCORE: 1.21

## 2022-08-30 NOTE — PROGRESS NOTES
Subjective:     Chief Complaint   Patient presents with    Establish Care    Referral Needed     HISTORY OF THE PRESENT ILLNESS: Patient is a 51 y.o. female. This pleasant patient is here today to establish care and discuss referrals to GYN and dermatology. His/her prior PCP was PETROS Larkin.    Need for referrals  Patient has a history of melanoma status post excision a few years ago and requests routine follow-up with dermatology given her history.  Denies any current lesions of concern.  Her sunscreen on her face but otherwise not as she likes to be can.    Patient has dealt with dryness after menopause in addition to dyspareunia related to that and issues with orgasm.  Orgasm is easier with masturbation.  Tried topical estrogen which helped somewhat but she did not really like in the past so would like a referral to gynecology to discuss.    Essential hypertension  Chronic. Takes her inderal 60mg daily and losartan 100mg daily after exercising. Prior was taking 120mg daily, but was getting lightheaded so reduced. Diuretics cause too much urinary frequency. Had norvasc prior, may have caused edema-unsure why not taking.    Current Outpatient Medications Ordered in Epic   Medication Sig Dispense Refill    amLODIPine (NORVASC) 2.5 MG Tab Take 1 Tablet by mouth every day. 90 Tablet 0    losartan (COZAAR) 100 MG Tab Take 1 Tablet by mouth every day. 90 Tablet 3    amitriptyline (ELAVIL) 50 MG Tab Take 1 Tablet by mouth every evening. 90 Tablet 3    cyclobenzaprine (FLEXERIL) 10 mg Tab Take 1 Tablet by mouth 3 times a day as needed for Moderate Pain or Muscle Spasms. 30 Tablet 0    propranolol LA (INDERAL LA) 60 MG CAPSULE SR 24 HR Take 1 Capsule by mouth every evening. 90 Capsule 1    gabapentin (NEURONTIN) 100 MG Cap Take 1 Capsule by mouth at bedtime. 90 Capsule 1    hydrOXYzine HCl (ATARAX) 50 MG Tab       rosuvastatin (CRESTOR) 40 MG tablet Take 1 tablet by mouth every day. (Patient not taking: Reported on  "8/30/2022) 30 tablet 11     No current [facility]-ordered facility-administered medications on file.     Health Maintenance: Declinces vaccines.    ROS:   Gen: no fevers/chills, no changes in weight  Eyes: no changes in vision  ENT: no sore throat  Pulm: no sob, no cough  CV: no chest pain, no palpitations  GI: no nausea/vomiting, occasional diarrhea/constipation, pain in abdomin improved with BM. BRBPR 3 weeks ago, one time and otherwise denies.  : no dysuria  MSk: no myalgias  Skin: no rash  Neuro: no severe headaches, no numbness/tingling  Heme/Lymph: no easy bruising      Objective:     Exam: BP (!) 144/106 (BP Location: Left arm, Patient Position: Sitting, BP Cuff Size: Adult)   Pulse 70   Temp 36.7 °C (98.1 °F) (Temporal)   Ht 1.676 m (5' 6\")   Wt 71.2 kg (157 lb)   SpO2 99%  Body mass index is 25.34 kg/m².    General: Well developed, well nourished in no acute distress.  Head: Normocephalic and atraumatic.  Eyes: Conjunctivae and extraocular motions are normal. Pupils are equal, round. No scleral icterus.   Mouth/Throat: Wearing mask.  Neck: Supple. Thyroid is not enlarged.  Pulmonary: Clear to ausculation.  Normal effort. No rales, ronchi, or wheezing.  Cardiovascular: Regular rate and rhythm without murmur.  Abdomen: Soft, nontender, nondistended. Normal bowel sounds. No HSM.  Neurologic: No gross/focal deficits. Normal gait.   Lymph: No cervical or supraclavicular lymph nodes are palpable  Skin: Warm and dry.  No obvious lesions.  Musculoskeletal: No extremity cyanosis, clubbing. Trace pitting edema, more notable on right but bilat.  Psych:   Appearance: Clothing and grooming normal.  Mood: always low grade anxious  Behavior: No psychomotor abnormalities or impulsivity. Attention is good. Patient is pleasant and cooperative.   Eye contact: good   Affect: normal  Speech/thought content: Normal speech pattern. Normal insight and reasoning, no evidence of psychotic process.     Assessment & Plan:   51 y.o. " female with the following -    1. Encounter to establish care    2. History of melanoma  Referred as requested, stress importance of sun protection.  - Referral to Dermatology    3. Dyspareunia in female  4. Menopausal vaginal dryness  5. Orgasm dysfunction  Chronic, not improved with topical estrogen. Referred as requested.  - Referral to Gynecology    6. Essential hypertension  Chronic, not well controlled.  Previously on 120 mg Inderal but having some lightheadedness so we will will continue 60 mg daily of Inderal in addition to losartan 100 mg daily and add amlodipine at a low dose.  Discussed titration to blood pressure goal by increasing to maximum of 10 mg daily.  Discussed possible side effects and return precautions.  - CBC WITH DIFFERENTIAL; Future  - amLODIPine (NORVASC) 2.5 MG Tab; Take 1 Tablet by mouth every day.  Dispense: 90 Tablet; Refill: 0  - losartan (COZAAR) 100 MG Tab; Take 1 Tablet by mouth every day.  Dispense: 90 Tablet; Refill: 3    7. Hyponatremia  Noted in the ER, trend.  - Comp Metabolic Panel; Future    8. Familial hypercholesteremia  This is a chronic condition, patient admits that she is not good about taking her statin.  We will check lab work off medication and discuss plan.  - Comp Metabolic Panel; Future  - TSH WITH REFLEX TO FT4; Future  - Lipid Profile; Future  - Lipoprotein (a); Future    9. Elevated alkaline phosphatase level  This is a chronic condition, evaluate further with vitamin D and isoenzymes.  - Comp Metabolic Panel; Future  - ALKALINE PHOSPHATASE ISOENZYMES; Future    10. Vitamin D deficiency  Noted prior, trend.  - VITAMIN D,25 HYDROXY (DEFICIENCY); Future    11. Episodic tension-type headache, not intractable  This is a chronic condition, overall well controlled with Elavil 50 mg as below.  Prior followed by neurology. She does infrequently also use butalbital prn which works well, discussed that more frequent use can cause rebound HA. Discussed that this  medication may be be contributing to chronic fatigue (Inderal may be contributing as well) but for now she would like to continue at this current dose.    - amitriptyline (ELAVIL) 50 MG Tab; Take 1 Tablet by mouth every evening.  Dispense: 90 Tablet; Refill: 3    12. Anxiety and depression  Patient with chronic history of anxiety after reported breakdown at 18 years old and previously followed by both psychology and psychiatry who prescribed Valium and Remeron.  Not currently taking any medication aside from propranolol which she states is not very helpful for the anxiety but overall well controlled in general with Elavil at this time.  Declines behavioral health referral.  - amitriptyline (ELAVIL) 50 MG Tab; Take 1 Tablet by mouth every evening.  Dispense: 90 Tablet; Refill: 3    13. Encounter for screening for malignant neoplasm of colon  Recommended colonoscopy given alternating diarrhea with constipation and recent bright red rectal bleeding which has resolved.  Patient elects to start with cologuard first, gave return precautions.   - COLOGUARD (FIT DNA)    14. Encounter for screening mammogram for breast cancer  - MA-SCREENING MAMMO BILAT W/TOMOSYNTHESIS W/CAD; Future    Return if symptoms worsen or fail to improve, for Blood pressure.    Please note that this dictation was created using voice recognition software. I have made every reasonable attempt to correct obvious errors, but I expect that there are errors of grammar and possibly content that I did not discover before finalizing the note.

## 2022-08-30 NOTE — PATIENT INSTRUCTIONS
Patient would like communication of their results via:        Cell Phone:   Telephone Information:   Mobile [phone]     Okay to leave a message containing results? Yes   Try amlodipine 2.5mg daily, can increase weekly to max dose of 10mg daily.    Ideal blood pressure <130/80 and at least <140/90.

## 2022-08-30 NOTE — ASSESSMENT & PLAN NOTE
Chronic. Takes her inderal 60mg daily and losartan 100mg daily after exercising. Prior was taking 120mg daily, but was getting lightheaded so reduced. Diuretics cause too much urinary frequency. Had norvasc prior, may have caused edema-unsure why not taking.

## 2022-09-27 ENCOUNTER — HOSPITAL ENCOUNTER (OUTPATIENT)
Dept: LAB | Facility: MEDICAL CENTER | Age: 52
End: 2022-09-27
Attending: STUDENT IN AN ORGANIZED HEALTH CARE EDUCATION/TRAINING PROGRAM
Payer: OTHER GOVERNMENT

## 2022-09-27 DIAGNOSIS — E78.01 FAMILIAL HYPERCHOLESTEREMIA: ICD-10-CM

## 2022-09-27 DIAGNOSIS — E87.1 HYPONATREMIA: ICD-10-CM

## 2022-09-27 DIAGNOSIS — R74.8 ELEVATED ALKALINE PHOSPHATASE LEVEL: ICD-10-CM

## 2022-09-27 DIAGNOSIS — I10 ESSENTIAL HYPERTENSION: ICD-10-CM

## 2022-09-27 DIAGNOSIS — E55.9 VITAMIN D DEFICIENCY: ICD-10-CM

## 2022-09-27 LAB
25(OH)D3 SERPL-MCNC: 40 NG/ML (ref 30–100)
ALBUMIN SERPL BCP-MCNC: 4.4 G/DL (ref 3.2–4.9)
ALBUMIN/GLOB SERPL: 1.6 G/DL
ALP SERPL-CCNC: 115 U/L (ref 30–99)
ALT SERPL-CCNC: 9 U/L (ref 2–50)
ANION GAP SERPL CALC-SCNC: 12 MMOL/L (ref 7–16)
AST SERPL-CCNC: 11 U/L (ref 12–45)
BASOPHILS # BLD AUTO: 0.6 % (ref 0–1.8)
BASOPHILS # BLD: 0.03 K/UL (ref 0–0.12)
BILIRUB SERPL-MCNC: 0.3 MG/DL (ref 0.1–1.5)
BUN SERPL-MCNC: 14 MG/DL (ref 8–22)
CALCIUM SERPL-MCNC: 9.3 MG/DL (ref 8.5–10.5)
CHLORIDE SERPL-SCNC: 108 MMOL/L (ref 96–112)
CHOLEST SERPL-MCNC: 241 MG/DL (ref 100–199)
CO2 SERPL-SCNC: 22 MMOL/L (ref 20–33)
CREAT SERPL-MCNC: 0.89 MG/DL (ref 0.5–1.4)
EOSINOPHIL # BLD AUTO: 0.16 K/UL (ref 0–0.51)
EOSINOPHIL NFR BLD: 3.2 % (ref 0–6.9)
ERYTHROCYTE [DISTWIDTH] IN BLOOD BY AUTOMATED COUNT: 42.1 FL (ref 35.9–50)
FASTING STATUS PATIENT QL REPORTED: NORMAL
GFR SERPLBLD CREATININE-BSD FMLA CKD-EPI: 77 ML/MIN/1.73 M 2
GLOBULIN SER CALC-MCNC: 2.8 G/DL (ref 1.9–3.5)
GLUCOSE SERPL-MCNC: 87 MG/DL (ref 65–99)
HCT VFR BLD AUTO: 37.4 % (ref 37–47)
HDLC SERPL-MCNC: 58 MG/DL
HGB BLD-MCNC: 12.3 G/DL (ref 12–16)
IMM GRANULOCYTES # BLD AUTO: 0.01 K/UL (ref 0–0.11)
IMM GRANULOCYTES NFR BLD AUTO: 0.2 % (ref 0–0.9)
LDLC SERPL CALC-MCNC: 167 MG/DL
LYMPHOCYTES # BLD AUTO: 2.17 K/UL (ref 1–4.8)
LYMPHOCYTES NFR BLD: 43.8 % (ref 22–41)
MCH RBC QN AUTO: 29.8 PG (ref 27–33)
MCHC RBC AUTO-ENTMCNC: 32.9 G/DL (ref 33.6–35)
MCV RBC AUTO: 90.6 FL (ref 81.4–97.8)
MONOCYTES # BLD AUTO: 0.36 K/UL (ref 0–0.85)
MONOCYTES NFR BLD AUTO: 7.3 % (ref 0–13.4)
NEUTROPHILS # BLD AUTO: 2.22 K/UL (ref 2–7.15)
NEUTROPHILS NFR BLD: 44.9 % (ref 44–72)
NRBC # BLD AUTO: 0 K/UL
NRBC BLD-RTO: 0 /100 WBC
PLATELET # BLD AUTO: 254 K/UL (ref 164–446)
PMV BLD AUTO: 11 FL (ref 9–12.9)
POTASSIUM SERPL-SCNC: 4.1 MMOL/L (ref 3.6–5.5)
PROT SERPL-MCNC: 7.2 G/DL (ref 6–8.2)
RBC # BLD AUTO: 4.13 M/UL (ref 4.2–5.4)
SODIUM SERPL-SCNC: 142 MMOL/L (ref 135–145)
TRIGL SERPL-MCNC: 81 MG/DL (ref 0–149)
TSH SERPL DL<=0.005 MIU/L-ACNC: 3.55 UIU/ML (ref 0.38–5.33)
WBC # BLD AUTO: 5 K/UL (ref 4.8–10.8)

## 2022-09-27 PROCEDURE — 85025 COMPLETE CBC W/AUTO DIFF WBC: CPT

## 2022-09-27 PROCEDURE — 84080 ASSAY ALKALINE PHOSPHATASES: CPT

## 2022-09-27 PROCEDURE — 82306 VITAMIN D 25 HYDROXY: CPT

## 2022-09-27 PROCEDURE — 36415 COLL VENOUS BLD VENIPUNCTURE: CPT

## 2022-09-27 PROCEDURE — 80061 LIPID PANEL: CPT

## 2022-09-27 PROCEDURE — 83695 ASSAY OF LIPOPROTEIN(A): CPT

## 2022-09-27 PROCEDURE — 84075 ASSAY ALKALINE PHOSPHATASE: CPT | Mod: XU

## 2022-09-27 PROCEDURE — 80053 COMPREHEN METABOLIC PANEL: CPT

## 2022-09-27 PROCEDURE — 84443 ASSAY THYROID STIM HORMONE: CPT

## 2022-09-28 LAB
ALP BONE SERPL-CCNC: 41 U/L (ref 0–55)
ALP ISOS SERPL HS-CCNC: 0 U/L
ALP LIVER SERPL-CCNC: 81 U/L (ref 0–94)
ALP SERPL-CCNC: 122 U/L (ref 40–120)

## 2022-09-30 ENCOUNTER — OFFICE VISIT (OUTPATIENT)
Dept: MEDICAL GROUP | Facility: PHYSICIAN GROUP | Age: 52
End: 2022-09-30
Payer: OTHER GOVERNMENT

## 2022-09-30 VITALS
OXYGEN SATURATION: 98 % | TEMPERATURE: 98.5 F | DIASTOLIC BLOOD PRESSURE: 100 MMHG | HEIGHT: 66 IN | SYSTOLIC BLOOD PRESSURE: 160 MMHG | BODY MASS INDEX: 26.03 KG/M2 | HEART RATE: 67 BPM | WEIGHT: 162 LBS

## 2022-09-30 DIAGNOSIS — I10 ESSENTIAL HYPERTENSION: ICD-10-CM

## 2022-09-30 DIAGNOSIS — G47.00 INSOMNIA, UNSPECIFIED TYPE: ICD-10-CM

## 2022-09-30 DIAGNOSIS — G25.81 RESTLESS LEGS SYNDROME (RLS): ICD-10-CM

## 2022-09-30 DIAGNOSIS — F41.9 ANXIETY: ICD-10-CM

## 2022-09-30 DIAGNOSIS — E78.01 FAMILIAL HYPERCHOLESTEREMIA: ICD-10-CM

## 2022-09-30 PROBLEM — E87.1 HYPONATREMIA: Status: RESOLVED | Noted: 2022-08-30 | Resolved: 2022-09-30

## 2022-09-30 LAB — LPA SERPL-MCNC: 38 MG/DL

## 2022-09-30 PROCEDURE — 99215 OFFICE O/P EST HI 40 MIN: CPT | Performed by: STUDENT IN AN ORGANIZED HEALTH CARE EDUCATION/TRAINING PROGRAM

## 2022-09-30 RX ORDER — ROSUVASTATIN CALCIUM 40 MG/1
40 TABLET, COATED ORAL DAILY
Qty: 90 TABLET | Refills: 3 | Status: SHIPPED | OUTPATIENT
Start: 2022-09-30 | End: 2023-01-05

## 2022-09-30 RX ORDER — BUTALBITAL, ASPIRIN, AND CAFFEINE 325; 50; 40 MG/1; MG/1; MG/1
CAPSULE ORAL
COMMUNITY
End: 2023-01-05

## 2022-09-30 RX ORDER — HYDROCHLOROTHIAZIDE 25 MG/1
25 TABLET ORAL DAILY
Qty: 90 TABLET | Refills: 3 | Status: SHIPPED | OUTPATIENT
Start: 2022-09-30 | End: 2023-01-05

## 2022-09-30 ASSESSMENT — FIBROSIS 4 INDEX: FIB4 SCORE: 0.78

## 2022-09-30 NOTE — PATIENT INSTRUCTIONS
Fasting labs in 3 months  Non-fasting labs in 2 weeks after start of hydrochlorothiazide     Ideal blood pressure <130/80 and at least <140/90.    Sleep hygiene checklist:  -Avoid naps.  Napping during the day can disturb the normal pattern of sleep and wakefulness.  -Avoid stimulants, such as caffeine and nicotine, and alcohol as bedtime approaches.  While alcohol is well known to speed the onset of sleep, the process of the body metabolizing the alcohol can cause arousal, thus disrupting sleep.  -Exercise.  All forms of exercise help to ensure sound sleep.  Vigorous activity should be conducted in the morning or late afternoon, while a relaxing exercise, like yoga, can be done before bed to help initiate a restful night sleep.  -Avoid foods too close to bedtime-particularly large meals and chocolate (which contains caffeine).  And try not to make any significant change to your diet.  For example, if you are struggling with sleep problem, is not a good time to start experimenting with spicy dishes. A light snack prior to bed may help as well.  -Soak up some natural light.  This is particularly important for older people who may not venture outside as frequently as children in younger adults.  Light exposure helps maintain a healthy sleep-wake cycle.  -Establish a regular bedtime routine.  Try to avoid emotionally upsetting conversations and activities before going to sleep. Stick to a routine sleep and wake up time (within an hour) regardless of weekday.  -Avoid clock watching. Set your alarm if needed and avoid looking at the clock at night if you awake early which can cause excessive worry about lack of sleep.  -Get out of bed. If you have not fallen asleep in approximately 20-30 minutes get out of bed and do something 'boring' until you feel tired then return to bed.  -Associated bed with sleep.  Is not a good idea to watch television, use her computer or phone, listen to the radio, or read while in  bed.  -Ensure a pleasant, relaxing sleep environment.  The bed should be comfortable, in the room should not be too hot, cold, or bright.

## 2022-09-30 NOTE — PROGRESS NOTES
Subjective:     Chief Complaint   Patient presents with    Lab Results     HPI:   Asher presents today for follow-up and discuss lab results.    Patient concerned about her kidney function.  Reports that she could not tolerate low-dose amlodipine due to swelling, had to use lubrication to get her rings off due to the edema.  Continues with propanolol and losartan.  Worried she will have a stroke with her HTN. She states that propanolol seems to help anxiety that is worse at night.  Reports issues with sleeping which are chronic and mostly related to anxiety.  Also reporting that restless legs sometimes will act up as well.    No problem-specific Assessment & Plan notes found for this encounter.    Current Outpatient Medications Ordered in Epic   Medication Sig Dispense Refill    ASPIRIN-CAFFEINE-BUTALBITAL (FIORINAL) -40 MG Cap       hydroCHLOROthiazide (HYDRODIURIL) 25 MG Tab Take 1 Tablet by mouth every day. 90 Tablet 3    rosuvastatin (CRESTOR) 40 MG tablet Take 1 Tablet by mouth every day. 90 Tablet 3    losartan (COZAAR) 100 MG Tab Take 1 Tablet by mouth every day. 90 Tablet 3    amitriptyline (ELAVIL) 50 MG Tab Take 1 Tablet by mouth every evening. 90 Tablet 3    cyclobenzaprine (FLEXERIL) 10 mg Tab Take 1 Tablet by mouth 3 times a day as needed for Moderate Pain or Muscle Spasms. 30 Tablet 0    propranolol LA (INDERAL LA) 60 MG CAPSULE SR 24 HR Take 1 Capsule by mouth every evening. 90 Capsule 1    gabapentin (NEURONTIN) 100 MG Cap Take 1 Capsule by mouth at bedtime. 90 Capsule 1    hydrOXYzine HCl (ATARAX) 50 MG Tab        No current Epic-ordered facility-administered medications on file.     ROS:  Gen: no fevers/chills, no changes in weight  Eyes: no changes in vision  ENT: no sore throat  Pulm: no sob, no cough  CV: no chest pain, palpitations at night at times associated with anxiety  GI: no nausea/vomiting, no diarrhea  MSk: no myalgias  Skin: Chronic itching without rash  Neuro: no severe or  "frequent headaches  Heme/Lymph: no easy bruising    Objective:     Exam:  BP (!) 160/100 (BP Location: Left arm, Patient Position: Sitting, BP Cuff Size: Adult)   Pulse 67   Temp 36.9 °C (98.5 °F) (Temporal)   Ht 1.676 m (5' 6\")   Wt 73.5 kg (162 lb)   SpO2 98%   BMI 26.15 kg/m²  Body mass index is 26.15 kg/m².    Physical Exam:  Constitutional: Well-developed and well-nourished. No acute distress.   Skin: Skin is warm and dry. No rash noted.  Head: Atraumatic without lesions.  Eyes: Conjunctivae and extraocular motions are normal. Pupils are equal, round. No scleral icterus.   Mouth/Throat: Wearing mask.  Neck: Supple, trachea midline.  Cardiovascular: Regular rate and rhythm, S1 and S2 without murmur, rubs, or gallops.  Lungs: Normal inspiratory effort, CTA bilaterally, no wheezes/rhonchi/rales  Extremities: No cyanosis, clubbing, erythema. Trace pitting edema on right distal shin.  Neurological: Alert and oriented x 3. No gross/focal deficits.  Psychiatric:    Appearance: Clothing and grooming normal, hair done and wearing make up.  Mood: anxious  Behavior: No psychomotor abnormalities or impulsivity. Attention is good. Patient is pleasant and cooperative.   Eye contact: good   Affect: normal  Speech/thought content: Normal speech pattern. Normal insight and reasoning, no evidence of psychotic process.     Labs: Reviewed from 9/27/2022    Assessment & Plan:     52 y.o. female with the following -     1. Essential hypertension  Chronic, not well controlled.  Norvasc caused edema. Continue 60 mg daily of Inderal (states has secondary benefit of helping anxiety), losartan 100 mg daily and start HCTZ as below.  Continue to log at home and follow-up for concerns, aware of ideal blood pressure ranges.  - hydroCHLOROthiazide (HYDRODIURIL) 25 MG Tab; Take 1 Tablet by mouth every day.  Dispense: 90 Tablet; Refill: 3  - Basic Metabolic Panel; Future    2. Familial hypercholesteremia  This is a chronic condition, " improved from prior and tolerating rosuvastatin well.  She had only been on the medication for approximately 2 weeks prior to the lab work so we will go ahead and trend in 3 months to reevaluate.  - rosuvastatin (CRESTOR) 40 MG tablet; Take 1 Tablet by mouth every day.  Dispense: 90 Tablet; Refill: 3  - Comp Metabolic Panel; Future  - Lipid Profile; Future    3. Anxiety  4. Insomnia, unspecified type  Chronic, referred over a year ago by her previous provider but ended up missing the appointment and not rescheduling.  Would benefit from psychology/psychiatry evaluation and treatment.    - Referral to Behavioral Health    5. Restless legs syndrome (RLS)  This is a chronic condition, not well controlled. Will check ferritin and discuss labs/RLS further at follow-up.  - FERRITIN; Future    I spent a total of 40 minutes with record review, exam, communication with the patient, and documentation of this encounter.    Return in about 4 weeks (around 10/28/2022), or if symptoms worsen or fail to improve, for Blood pressure.    Please note that this dictation was created using voice recognition software. I have made every reasonable attempt to correct obvious errors, but I expect that there are errors of grammar and possibly content that I did not discover before finalizing the note.

## 2022-10-13 ENCOUNTER — OFFICE VISIT (OUTPATIENT)
Dept: MEDICAL GROUP | Facility: PHYSICIAN GROUP | Age: 52
End: 2022-10-13
Payer: OTHER GOVERNMENT

## 2022-10-13 VITALS
HEART RATE: 87 BPM | DIASTOLIC BLOOD PRESSURE: 82 MMHG | SYSTOLIC BLOOD PRESSURE: 132 MMHG | WEIGHT: 154 LBS | HEIGHT: 66 IN | TEMPERATURE: 98.9 F | BODY MASS INDEX: 24.75 KG/M2 | RESPIRATION RATE: 20 BRPM | OXYGEN SATURATION: 97 %

## 2022-10-13 DIAGNOSIS — I10 ESSENTIAL HYPERTENSION: ICD-10-CM

## 2022-10-13 DIAGNOSIS — F41.9 ANXIETY: ICD-10-CM

## 2022-10-13 DIAGNOSIS — G47.00 INSOMNIA, UNSPECIFIED TYPE: ICD-10-CM

## 2022-10-13 PROCEDURE — 99215 OFFICE O/P EST HI 40 MIN: CPT | Performed by: STUDENT IN AN ORGANIZED HEALTH CARE EDUCATION/TRAINING PROGRAM

## 2022-10-13 RX ORDER — SUVOREXANT 10 MG/1
1 TABLET, FILM COATED ORAL NIGHTLY PRN
Qty: 30 TABLET | Refills: 0 | Status: SHIPPED | OUTPATIENT
Start: 2022-10-13 | End: 2022-10-16

## 2022-10-13 ASSESSMENT — FIBROSIS 4 INDEX: FIB4 SCORE: 0.78

## 2022-10-13 NOTE — PROGRESS NOTES
Subjective:     Chief Complaint   Patient presents with    Other     Pt states that she is having a hard time sleeping and is stating that she is all over the place with her symptoms      HPI:   Asher presents today with concerns for sleep, anxiety and HTN.    Anxiety  Acute on chronic and effecting sleep. Patient's goal is to improve sleep as she reports passive SI the other night without intent or plan to kill herself due to lack of sleep. Her spouse is with her today.    Patient reports concern she will 'stroke out' because of her blood pressure. Reports normal readings at home with current regimen.     Endorses poor energy, increase in tension headaches with eye pain/pressure and neck pain. Given this she has concern for intracranial hypertension given her Internet searches. Denies visual disturbances. Continues with exercise.    States the soonest appointment with psychiatry is in December. States remotely seen by psychiatry and has needed psychiatric inpatient care in the past.    Prior medications trailed (reported by patient):  Ativan (no effect), valium (no effect), hydroxyzine (no effect), remeron (worked the best with sleep and somewhat helped anxiety-caused weight gain), paxil (made her anxiety worse), buspar (didn't like), trazodone (not sure if it worked), and xanax (took the last few night to help with sleep-states she had an old rx). States all antidepressants had made her anxiety worse in the past (in her mid 20s). In the past she endorses non prescription opiate use (states last  was addicted to narcotics), but denies current use.    Essential hypertension  Chronic. Patient reports BP well controlled on current regimen. Brings in her machine/cuff to review BP log. Most recently taking HCTZ 12.5mg daily (prescribed 25mg), losartan 100mg daily and self increased propranolol from 60mg to 120mg SR daily.    Current Outpatient Medications Ordered in Epic   Medication Sig Dispense Refill     "traZODone (DESYREL) 50 MG Tab Take 1-2 Tablets by mouth at bedtime as needed for Sleep. 30 Tablet 0    ASPIRIN-CAFFEINE-BUTALBITAL (FIORINAL) -40 MG Cap       hydroCHLOROthiazide (HYDRODIURIL) 25 MG Tab Take 1 Tablet by mouth every day. (Patient taking differently: Take 12.5 mg by mouth every day.) 90 Tablet 3    rosuvastatin (CRESTOR) 40 MG tablet Take 1 Tablet by mouth every day. 90 Tablet 3    losartan (COZAAR) 100 MG Tab Take 1 Tablet by mouth every day. 90 Tablet 3    amitriptyline (ELAVIL) 50 MG Tab Take 1 Tablet by mouth every evening. 90 Tablet 3    cyclobenzaprine (FLEXERIL) 10 mg Tab Take 1 Tablet by mouth 3 times a day as needed for Moderate Pain or Muscle Spasms. 30 Tablet 0    propranolol LA (INDERAL LA) 60 MG CAPSULE SR 24 HR Take 1 Capsule by mouth every evening. (Patient taking differently: Take 120 mg by mouth every evening.) 90 Capsule 1     No current Epic-ordered facility-administered medications on file.     ROS:  Gen: no fevers/chills, no changes in weight  Eyes: no changes in vision  ENT: no sore throat  Pulm: no sob, no cough  CV: no chest pain, no palpitations  GI: no nausea/vomiting, no diarrhea  MSk: no myalgias  Skin: no rash  Neuro: no severe headaches, no numbness/tingling  Heme/Lymph: no easy bruising    Objective:     Exam:  /82 (BP Location: Left arm, Patient Position: Sitting, BP Cuff Size: Adult)   Pulse 87   Temp 37.2 °C (98.9 °F) (Temporal)   Resp 20   Ht 1.676 m (5' 6\")   Wt 69.9 kg (154 lb)   SpO2 97%   BMI 24.86 kg/m²  Body mass index is 24.86 kg/m².    Physical Exam:  Constitutional: Well-developed and well-nourished. No acute distress.   Skin: Skin is warm and dry. No rash noted.  Head: Atraumatic without lesions.  Eyes: Conjunctivae and extraocular motions are normal. Pupils are equal, round. No scleral icterus.   Mouth/Throat: Wearing mask.  Neck: Supple, trachea midline.   Cardiovascular: Regular rate and rhythm, S1 and S2 without murmur, rubs, or " gallops.  Lungs: Normal inspiratory effort, CTA bilaterally, no wheezes/rhonchi/rales  Extremities: No cyanosis, clubbing, erythema, nor edema.  Neurological: Alert and oriented x 3. No gross/focal deficits.  Psychiatric:   Appearance: Clothing and grooming normal. Hair is curled and styled. Wearing make up.  Mood: anxious  Behavior: No psychomotor abnormalities or impulsivity. Attention is good. Patient is pleasant and cooperative.   Eye contact: good   Affect: mood-congruent, labile  Speech/thought content: Normal speech pattern. Fair insight and reasoning, no evidence of psychotic process. Catastrophic thinking.   Reports suicidal thoughts, no plans or action.    Labs: Reviewed from 9/27/2022  Imaging: MRI brain 7/2021    Assessment & Plan:     52 y.o. female with the following -     1. Essential hypertension  Chronic, well controlled here and at home. No changes to current treatment/medications recommended unless she is having concerns or home BP out of range.    2. Insomnia, unspecified type  Acute on chronic and largely related to anxiety as below. Antihistamines and melatonin not effective. Belsomra too expensive, requests to trial of trazodone which had worked well for her sister. Provided information regarding SE/serotonin syndrome. Plan for close interval f/u.  - traZODone (DESYREL) 50 MG Tab; Take 1-2 Tablets by mouth at bedtime as needed for Sleep.  Dispense: 30 Tablet; Refill: 0  - Referral to Psychiatry    3. Anxiety  Acute on chronic. Finds propranolol to be helpful, so continue for this and HTN as above. Patient mentioned clonidine, but given that it can cause rebound HTN will avoid. Has a reported history of inpatient treatment, prior non-prescription opiate use, and recent passive suicidal ideation with no current safety concerns. Multiple medications prior not effective or tolerated so not interested in trial of medication at this time aside from xanx 0.5mg which she has recently taken for  sleep-avoiding benzos given history of non-prescription opiate use. Displays some cluster B traits. Unfortunately next available with [facility] not until 12/2022 per patient. Would benefit from both psychiatry/psychology evaluation ASAP including outside of [facility] if able given complaints/history-another referral placed.  - Referral to Psychiatry    I spent a total of 45 minutes with record review, exam, communication with the patient, and documentation of this encounter.    Return in about 12 days (around 10/25/2022), or if symptoms worsen or fail to improve.    Please note that this dictation was created using voice recognition software. I have made every reasonable attempt to correct obvious errors, but I expect that there are errors of grammar and possibly content that I did not discover before finalizing the note.

## 2022-10-13 NOTE — ASSESSMENT & PLAN NOTE
Acute on chronic and effecting sleep. Patient's goal is to improve sleep as she reports passive SI the other night without intent or plan to kill herself due to lack of sleep. Her spouse is with her today.    Patient reports concern she will 'stroke out' because of her blood pressure. Reports normal readings at home with current regimen.     Endorses poor energy, increase in tension headaches with eye pain/pressure and neck pain. Given this she has concern for intracranial hypertension given her Internet searches. Denies visual disturbances. Continues with exercise.    States the soonest appointment with psychiatry is in December. States remotely seen by psychiatry and has needed psychiatric inpatient care in the past.    Prior medications trailed (reported by patient):  Ativan (no effect), valium (no effect), hydroxyzine (no effect), remeron (worked the best with sleep and somewhat helped anxiety-caused weight gain), paxil (made her anxiety worse), buspar (didn't like), trazodone (not sure if it worked), and xanax (took the last few night to help with sleep-states she had an old rx). States all antidepressants had made her anxiety worse in the past (in her mid 20s). In the past she endorses non prescription opiate use (states last  was addicted to narcotics), but denies current use.

## 2022-10-16 RX ORDER — TRAZODONE HYDROCHLORIDE 50 MG/1
50-100 TABLET ORAL
Qty: 30 TABLET | Refills: 0 | Status: SHIPPED | OUTPATIENT
Start: 2022-10-16 | End: 2023-01-05

## 2022-10-16 NOTE — ASSESSMENT & PLAN NOTE
Chronic. Patient reports BP well controlled on current regimen. Brings in her machine/cuff to review BP log. Most recently taking HCTZ 12.5mg daily (prescribed 25mg), losartan 100mg daily and self increased propranolol from 60mg to 120mg SR daily.

## 2022-10-18 ENCOUNTER — HOSPITAL ENCOUNTER (OUTPATIENT)
Dept: RADIOLOGY | Facility: MEDICAL CENTER | Age: 52
End: 2022-10-18
Attending: STUDENT IN AN ORGANIZED HEALTH CARE EDUCATION/TRAINING PROGRAM
Payer: OTHER GOVERNMENT

## 2022-10-18 DIAGNOSIS — Z12.31 ENCOUNTER FOR SCREENING MAMMOGRAM FOR BREAST CANCER: ICD-10-CM

## 2022-10-18 PROCEDURE — 77063 BREAST TOMOSYNTHESIS BI: CPT

## 2022-11-14 ENCOUNTER — HOSPITAL ENCOUNTER (OUTPATIENT)
Facility: MEDICAL CENTER | Age: 52
End: 2022-11-14
Payer: OTHER GOVERNMENT

## 2022-11-14 ENCOUNTER — HOSPITAL ENCOUNTER (OUTPATIENT)
Dept: LAB | Facility: MEDICAL CENTER | Age: 52
End: 2022-11-14

## 2022-11-14 LAB — PATHOLOGY CONSULT NOTE: NORMAL

## 2022-11-14 PROCEDURE — 88175 CYTOPATH C/V AUTO FLUID REDO: CPT

## 2022-11-14 PROCEDURE — 88305 TISSUE EXAM BY PATHOLOGIST: CPT

## 2022-11-15 LAB — CYTOLOGY REG CYTOL: NORMAL

## 2023-03-01 ENCOUNTER — HOSPITAL ENCOUNTER (OUTPATIENT)
Facility: MEDICAL CENTER | Age: 53
End: 2023-03-01
Attending: STUDENT IN AN ORGANIZED HEALTH CARE EDUCATION/TRAINING PROGRAM
Payer: OTHER GOVERNMENT

## 2023-03-01 ENCOUNTER — OFFICE VISIT (OUTPATIENT)
Dept: URGENT CARE | Facility: PHYSICIAN GROUP | Age: 53
End: 2023-03-01
Payer: OTHER GOVERNMENT

## 2023-03-01 VITALS
SYSTOLIC BLOOD PRESSURE: 134 MMHG | HEIGHT: 66 IN | OXYGEN SATURATION: 94 % | RESPIRATION RATE: 20 BRPM | TEMPERATURE: 97.9 F | BODY MASS INDEX: 27.32 KG/M2 | DIASTOLIC BLOOD PRESSURE: 88 MMHG | HEART RATE: 96 BPM | WEIGHT: 170 LBS

## 2023-03-01 DIAGNOSIS — N30.01 ACUTE CYSTITIS WITH HEMATURIA: ICD-10-CM

## 2023-03-01 DIAGNOSIS — R43.8 METALLIC TASTE: ICD-10-CM

## 2023-03-01 DIAGNOSIS — G47.00 INSOMNIA, UNSPECIFIED TYPE: ICD-10-CM

## 2023-03-01 LAB
APPEARANCE UR: CLEAR
BILIRUB UR STRIP-MCNC: NEGATIVE MG/DL
COLOR UR AUTO: YELLOW
GLUCOSE UR STRIP.AUTO-MCNC: NEGATIVE MG/DL
KETONES UR STRIP.AUTO-MCNC: NEGATIVE MG/DL
LEUKOCYTE ESTERASE UR QL STRIP.AUTO: NORMAL
NITRITE UR QL STRIP.AUTO: NEGATIVE
PH UR STRIP.AUTO: 6 [PH] (ref 5–8)
PROT UR QL STRIP: NEGATIVE MG/DL
RBC UR QL AUTO: NORMAL
SP GR UR STRIP.AUTO: 1.01
UROBILINOGEN UR STRIP-MCNC: 0.2 MG/DL

## 2023-03-01 PROCEDURE — 87077 CULTURE AEROBIC IDENTIFY: CPT

## 2023-03-01 PROCEDURE — 81002 URINALYSIS NONAUTO W/O SCOPE: CPT | Performed by: STUDENT IN AN ORGANIZED HEALTH CARE EDUCATION/TRAINING PROGRAM

## 2023-03-01 PROCEDURE — 99214 OFFICE O/P EST MOD 30 MIN: CPT | Performed by: STUDENT IN AN ORGANIZED HEALTH CARE EDUCATION/TRAINING PROGRAM

## 2023-03-01 PROCEDURE — 87186 SC STD MICRODIL/AGAR DIL: CPT

## 2023-03-01 PROCEDURE — 87086 URINE CULTURE/COLONY COUNT: CPT

## 2023-03-01 RX ORDER — GABAPENTIN 100 MG/1
100 CAPSULE ORAL
COMMUNITY
Start: 2023-01-21 | End: 2023-04-23

## 2023-03-01 RX ORDER — ACYCLOVIR 200 MG/1
CAPSULE ORAL
COMMUNITY
Start: 2022-12-29 | End: 2023-01-05

## 2023-03-01 RX ORDER — PROGESTERONE 100 MG/1
100 CAPSULE ORAL DAILY
COMMUNITY
End: 2023-04-23

## 2023-03-01 RX ORDER — NITROFURANTOIN 25; 75 MG/1; MG/1
100 CAPSULE ORAL EVERY 12 HOURS
Qty: 10 CAPSULE | Refills: 0 | Status: SHIPPED | OUTPATIENT
Start: 2023-03-01 | End: 2023-03-06

## 2023-03-01 RX ORDER — ZOLPIDEM TARTRATE 5 MG/1
5 TABLET ORAL
COMMUNITY
Start: 2022-12-02 | End: 2023-04-23

## 2023-03-01 ASSESSMENT — ENCOUNTER SYMPTOMS
CHILLS: 0
NAUSEA: 0
VOMITING: 0
SWEATS: 0
FLANK PAIN: 0

## 2023-03-01 ASSESSMENT — FIBROSIS 4 INDEX: FIB4 SCORE: 0.78

## 2023-03-01 NOTE — PROGRESS NOTES
"Subjective     Asher James is a 52 y.o. female who presents with UTI (Metallic taste,burning,frequent urination,x1 day)            Asher is a 52 y.o. male who presents to urgent care for what she believes is UTI.  Patient states he used to have UTIs more recurrently when she was younger so its been a while since she has had 1 but states symptoms are very similar to prior UTIs.  She patient is experiencing symptoms of urinary frequency, urinary urgency and dysuria.  Patient also brings up concern of foul-smelling urine and metallic taste in her mouth.  Patient states she googled online those 2 symptoms and is concerned about her kidney.  Patient states metallic taste started approximately a week ago.  She does room for prior cold symptoms a week prior but does not believe they are correlated.  Patient recently started hormone replacement therapy and has had follow-up appointment with behavioral health for difficulty sleeping.    Dysuria   This is a new problem. The current episode started yesterday. The quality of the pain is described as burning. The pain is mild. There has been no fever. Associated symptoms include frequency and urgency. Pertinent negatives include no chills, discharge, flank pain, hematuria, hesitancy, nausea, possible pregnancy, sweats or vomiting. She has tried increased fluids for the symptoms.     Review of Systems   Constitutional:  Negative for chills.   Gastrointestinal:  Negative for nausea and vomiting.   Genitourinary:  Positive for dysuria, frequency and urgency. Negative for flank pain, hematuria and hesitancy.            Objective     /88 (BP Location: Right arm, Patient Position: Sitting, BP Cuff Size: Adult)   Pulse 96   Temp 36.6 °C (97.9 °F) (Temporal)   Resp 20   Ht 1.676 m (5' 6\")   Wt 77.1 kg (170 lb)   SpO2 94%   BMI 27.44 kg/m²      Physical Exam  Vitals reviewed.   Constitutional:       General: She is not in acute distress.     Appearance: " Normal appearance. She is not ill-appearing, toxic-appearing or diaphoretic.   HENT:      Head: Normocephalic and atraumatic.   Eyes:      Extraocular Movements: Extraocular movements intact.      Conjunctiva/sclera: Conjunctivae normal.      Pupils: Pupils are equal, round, and reactive to light.   Cardiovascular:      Rate and Rhythm: Normal rate.   Pulmonary:      Effort: Pulmonary effort is normal.   Abdominal:      General: Abdomen is flat.      Palpations: Abdomen is soft.      Tenderness: There is no right CVA tenderness or left CVA tenderness.   Skin:     General: Skin is warm and dry.   Neurological:      General: No focal deficit present.      Mental Status: She is alert. Mental status is at baseline.                           Assessment & Plan        1. Acute cystitis with hematuria  - POCT Urinalysis  - URINE CULTURE(NEW); Future  - nitrofurantoin (MACROBID) 100 MG Cap; Take 1 Capsule by mouth every 12 hours for 5 days.  Dispense: 10 Capsule; Refill: 0  - Presenting symptoms and POCT urinalysis consistent with urinary tract infection.  Urine culture collected.  Results will be available via [facility].  Patient will be contacted of any adjustment in antibiotic therapy pending urine culture.    2. Metallic taste  - Patient reports metallic taste in her mouth for the last 1-2 weeks. Has previously discussed with PCP. Follow up with PCP.    3. Insomnia, unspecified type  - States she has had improvement since starting HRT. PCP gave referral to follow up with psychiatry regarding insomnia and anxiety. She states anxiety has also improved since HRT and better sleep.    Differential diagnoses, supportive care measures  and indications for immediate follow-up discussed with patient. Pathogenesis of diagnosis discussed including typical length and natural progression.  Advised to follow up with PCP.    Instructed to return to urgent care or nearest emergency department if symptoms fail to improve, for any change in  condition, further concerns, or new concerning symptoms.    Patient states understanding and agrees with the plan of care and discharge instructions.

## 2023-03-04 LAB
BACTERIA UR CULT: ABNORMAL
BACTERIA UR CULT: ABNORMAL
SIGNIFICANT IND 70042: ABNORMAL
SITE SITE: ABNORMAL
SOURCE SOURCE: ABNORMAL

## 2023-04-23 ENCOUNTER — OFFICE VISIT (OUTPATIENT)
Dept: URGENT CARE | Facility: PHYSICIAN GROUP | Age: 53
End: 2023-04-23
Payer: OTHER GOVERNMENT

## 2023-04-23 VITALS
HEART RATE: 75 BPM | DIASTOLIC BLOOD PRESSURE: 86 MMHG | TEMPERATURE: 97.8 F | RESPIRATION RATE: 18 BRPM | WEIGHT: 166 LBS | SYSTOLIC BLOOD PRESSURE: 128 MMHG | OXYGEN SATURATION: 95 % | BODY MASS INDEX: 26.68 KG/M2 | HEIGHT: 66 IN

## 2023-04-23 DIAGNOSIS — R10.11 RUQ PAIN: ICD-10-CM

## 2023-04-23 LAB
APPEARANCE UR: CLEAR
BILIRUB UR STRIP-MCNC: NEGATIVE MG/DL
COLOR UR AUTO: NORMAL
GLUCOSE UR STRIP.AUTO-MCNC: NEGATIVE MG/DL
KETONES UR STRIP.AUTO-MCNC: NEGATIVE MG/DL
LEUKOCYTE ESTERASE UR QL STRIP.AUTO: NEGATIVE
NITRITE UR QL STRIP.AUTO: NEGATIVE
PH UR STRIP.AUTO: 5 [PH] (ref 5–8)
PROT UR QL STRIP: NEGATIVE MG/DL
RBC UR QL AUTO: NORMAL
SP GR UR STRIP.AUTO: 1
UROBILINOGEN UR STRIP-MCNC: 0.2 MG/DL

## 2023-04-23 PROCEDURE — 99214 OFFICE O/P EST MOD 30 MIN: CPT | Performed by: PHYSICIAN ASSISTANT

## 2023-04-23 PROCEDURE — 81002 URINALYSIS NONAUTO W/O SCOPE: CPT | Performed by: PHYSICIAN ASSISTANT

## 2023-04-23 ASSESSMENT — ENCOUNTER SYMPTOMS
VOMITING: 0
RESPIRATORY NEGATIVE: 1
HEMATOCHEZIA: 0
CARDIOVASCULAR NEGATIVE: 1
CHILLS: 1
BELCHING: 0
FEVER: 0
BLOOD IN STOOL: 0
ABDOMINAL PAIN: 1
CONSTIPATION: 0
DIARRHEA: 0
NAUSEA: 1
ANOREXIA: 0
MYALGIAS: 0

## 2023-04-23 ASSESSMENT — FIBROSIS 4 INDEX: FIB4 SCORE: 0.78

## 2023-04-23 NOTE — PROGRESS NOTES
Subjective     Asher James is a very pleasant 52 y.o. female who presents with Other (Possible pancreatitis,bloating under sternum for a week.)            Patient presenting with epigastric and right upper quadrant pain intermittently for the last week.  She states it is significantly worse after eating and lying down.  Feels as if she is bloated or full.  She did feel some constipation and completed an enema with normal stools, no blood.  Patient denies excess alcohol use, tobacco use, or excessive NSAID use.  She denies fever, vomiting, diarrhea, bloody stools, urinary symptoms.    Abdominal Pain  This is a new problem. The current episode started in the past 7 days. The onset quality is sudden. The problem occurs daily. The problem has been unchanged. The pain is located in the epigastric region and LUQ. Quality: Bloating. The abdominal pain does not radiate. Associated symptoms include nausea. Pertinent negatives include no anorexia, belching, constipation, diarrhea, dysuria, fever, frequency, hematochezia, hematuria, melena, myalgias or vomiting. The pain is aggravated by certain positions and eating. Treatments tried: Enema. The treatment provided no relief. Her past medical history is significant for irritable bowel syndrome. There is no history of abdominal surgery, GERD, pancreatitis or PUD.       PMH:  has a past medical history of Cancer (HCC), Head ache, Hyperlipidemia, Hypertension, Hyponatremia (8/30/2022), and Migraine.  MEDS:   Current Outpatient Medications:     propranolol CR (INDERAL LA) 120 MG CAPSULE SR 24 HR, TAKE 1 CAPSULE BY MOUTH EVERY DAY, Disp: 90 Capsule, Rfl: 3    losartan (COZAAR) 100 MG Tab, Take 1 Tablet by mouth every day., Disp: 90 Tablet, Rfl: 3    amitriptyline (ELAVIL) 50 MG Tab, Take 1 Tablet by mouth every evening., Disp: 90 Tablet, Rfl: 3  ALLERGIES: No Known Allergies  SURGHX:   Past Surgical History:   Procedure Laterality Date    TONSILLECTOMY    "    SOCHX:  reports that she has quit smoking. Her smoking use included cigarettes. She has never used smokeless tobacco. She reports current alcohol use. She reports that she does not currently use drugs.  FH: family history includes Heart Disease in her brother and sister; Hyperlipidemia in her brother, mother, and sister; Hypertension in her brother and sister.      Review of Systems   Constitutional:  Positive for chills. Negative for fever.   Respiratory: Negative.     Cardiovascular: Negative.    Gastrointestinal:  Positive for abdominal pain and nausea. Negative for anorexia, blood in stool, constipation, diarrhea, hematochezia, melena and vomiting.   Genitourinary: Negative.  Negative for dysuria, frequency and hematuria.   Musculoskeletal:  Negative for myalgias.       Medications, Allergies, and current problem list reviewed today in Epic           Objective     /86 (BP Location: Right arm, Patient Position: Sitting, BP Cuff Size: Adult)   Pulse 75   Temp 36.6 °C (97.8 °F) (Temporal)   Resp 18   Ht 1.676 m (5' 6\")   Wt 75.3 kg (166 lb)   SpO2 95%   BMI 26.79 kg/m²      Physical Exam  Vitals and nursing note reviewed.   Constitutional:       General: She is not in acute distress.     Appearance: Normal appearance. She is well-developed. She is not ill-appearing, toxic-appearing or diaphoretic.   HENT:      Head: Normocephalic and atraumatic.      Right Ear: Tympanic membrane, ear canal and external ear normal.      Left Ear: Tympanic membrane, ear canal and external ear normal.      Nose: Nose normal. No congestion or rhinorrhea.      Mouth/Throat:      Mouth: Mucous membranes are moist.      Pharynx: No oropharyngeal exudate or posterior oropharyngeal erythema.   Eyes:      General:         Right eye: No discharge.         Left eye: No discharge.      Conjunctiva/sclera: Conjunctivae normal.   Cardiovascular:      Rate and Rhythm: Normal rate and regular rhythm.      Pulses: Normal pulses.     "  Heart sounds: Normal heart sounds.   Pulmonary:      Effort: Pulmonary effort is normal. No respiratory distress.      Breath sounds: Normal breath sounds. No wheezing, rhonchi or rales.   Abdominal:      General: Abdomen is flat. Bowel sounds are normal.      Palpations: Abdomen is soft. There is no pulsatile mass.      Tenderness: There is abdominal tenderness in the right upper quadrant and epigastric area. There is no right CVA tenderness, left CVA tenderness, guarding or rebound. Negative signs include Hinson's sign and McBurney's sign.      Hernia: No hernia is present.       Musculoskeletal:      Cervical back: Normal range of motion and neck supple.      Right lower leg: No edema.      Left lower leg: No edema.   Lymphadenopathy:      Cervical: No cervical adenopathy.   Skin:     General: Skin is warm and dry.   Neurological:      General: No focal deficit present.      Mental Status: She is alert and oriented to person, place, and time. Mental status is at baseline.   Psychiatric:         Mood and Affect: Mood normal.         Behavior: Behavior normal.         Thought Content: Thought content normal.         Judgment: Judgment normal.              IMPRESSION:     1.  No cholelithiasis or biliary dilatation.  2.  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  Pain is worse after eating but no postprandial emesis.  She denies fever chills or body aches.  She denies melena or hematochezia.  Normal urine output with no UTI symptoms.  Patient denies risk factors including alcohol use, drug use, tobacco use, excess NSAID use.  No pertinent past abdominal or surgical history.  Vital signs are normal.  Abdominal exam shows mild epigastric to right upper quadrant TTP without rebound or guarding.  Negative Hinson sign.  No  McBurney's point tenderness.  No pulsatile masses, skin changes or hernias.  No CVA tenderness or flank pain.  Urinalysis unremarkable.  No x-ray in clinic today.  We did order a right upper quadrant ultrasound to evaluate for hepatobiliary etiology based on right upper quadrant pain and postprandial discomfort/bloating which was within normal limits.  Patient does have a history of constipation and given reassuring urinalysis and ultrasound we will treat more aggressively with OTC meds and conservative measures.  Referral to GI may be indicated if no improvement.    1. RUQ pain  US-RUQ    POCT Urinalysis        Return to clinic or go to ED if symptoms worsen or persist. Red flag symptoms and indications for ED discussed at length. Patient/Parent/Guardian voices understanding. Follow-up with your primary care provider in 3-5 days. All side effects and potential interactions of prescribed medication discussed including allergic response, GI upset, tendon injury, rash, sedation, OCP effectiveness, etc.    Please note that this dictation was created using voice recognition software. I have made every reasonable attempt to correct obvious errors, but I expect that there are errors of grammar and possibly content that I did not discover before finalizing the note.

## 2023-04-24 ENCOUNTER — HOSPITAL ENCOUNTER (OUTPATIENT)
Dept: RADIOLOGY | Facility: MEDICAL CENTER | Age: 53
End: 2023-04-24
Attending: PHYSICIAN ASSISTANT
Payer: OTHER GOVERNMENT

## 2023-04-24 DIAGNOSIS — R10.11 RUQ PAIN: ICD-10-CM

## 2023-04-24 PROCEDURE — 76705 ECHO EXAM OF ABDOMEN: CPT

## 2023-04-25 ENCOUNTER — APPOINTMENT (OUTPATIENT)
Dept: RADIOLOGY | Facility: IMAGING CENTER | Age: 53
End: 2023-04-25
Attending: STUDENT IN AN ORGANIZED HEALTH CARE EDUCATION/TRAINING PROGRAM
Payer: OTHER GOVERNMENT

## 2023-04-25 ENCOUNTER — OFFICE VISIT (OUTPATIENT)
Dept: MEDICAL GROUP | Facility: PHYSICIAN GROUP | Age: 53
End: 2023-04-25
Payer: OTHER GOVERNMENT

## 2023-04-25 VITALS
HEART RATE: 74 BPM | HEIGHT: 66 IN | WEIGHT: 164 LBS | SYSTOLIC BLOOD PRESSURE: 138 MMHG | BODY MASS INDEX: 26.36 KG/M2 | OXYGEN SATURATION: 95 % | TEMPERATURE: 99.4 F | RESPIRATION RATE: 14 BRPM | DIASTOLIC BLOOD PRESSURE: 90 MMHG

## 2023-04-25 DIAGNOSIS — R19.5 MUCUS IN STOOL: ICD-10-CM

## 2023-04-25 DIAGNOSIS — R10.10 UPPER ABDOMINAL PAIN: ICD-10-CM

## 2023-04-25 DIAGNOSIS — R14.0 ABDOMINAL BLOATING: ICD-10-CM

## 2023-04-25 DIAGNOSIS — K59.09 CHRONIC CONSTIPATION: ICD-10-CM

## 2023-04-25 DIAGNOSIS — R15.9 INCONTINENCE OF FECES, UNSPECIFIED FECAL INCONTINENCE TYPE: ICD-10-CM

## 2023-04-25 PROBLEM — R31.9 HEMATURIA: Status: ACTIVE | Noted: 2023-04-25

## 2023-04-25 PROCEDURE — 99214 OFFICE O/P EST MOD 30 MIN: CPT | Performed by: STUDENT IN AN ORGANIZED HEALTH CARE EDUCATION/TRAINING PROGRAM

## 2023-04-25 PROCEDURE — 74018 RADEX ABDOMEN 1 VIEW: CPT | Mod: TC | Performed by: RADIOLOGY

## 2023-04-25 RX ORDER — ZOLPIDEM TARTRATE 5 MG/1
TABLET ORAL
COMMUNITY
Start: 2022-11-15

## 2023-04-25 ASSESSMENT — PATIENT HEALTH QUESTIONNAIRE - PHQ9: CLINICAL INTERPRETATION OF PHQ2 SCORE: 0

## 2023-04-25 ASSESSMENT — FIBROSIS 4 INDEX: FIB4 SCORE: 0.78

## 2023-04-25 NOTE — PATIENT INSTRUCTIONS
Decrease amitriptyline to 25mg daily, okay to resume prior dose of 50 mg daily if you get a recurrence of your headaches.    Titrate MiraLAX to daily soft stools.  You might think about starting off with a bowel cleanse with repeating a dose every 1-2 hours as tolerated first prior to doing the titration dosing.

## 2023-04-25 NOTE — PROGRESS NOTES
Subjective:     Chief Complaint   Patient presents with    Bloating     Has seen ER     HPI:   Asher presents today with upper abdominal pressure and bloating.    Patient went to urgent care 4/23/2023-related lab and imaging reviewed, encounter not signed/complete.    Reports about a week ago she had upper abdominal pain and bloating with pressure. Reports feeling full but very hungry. Reports she has been doing intermittent fasting (16 hours), but reports no changes to her diet recently.    Was on 100mg progesterone and estrogen patch 0.5mg/24hr prescribed by gynecology that she was cutting in half since 11/2022-she wondered if this was contributing to the symptoms so she recently discontinued.  It was very helpful for her mood and sleep.  Has recently needed to take 5 mg of Ambien since being off her HRT. Taking the 5mg dose of Ambien. Has cramping prior to BM then resolves after large BM.    Reports chronic 'fecal mucus' (since she was 30s) and chronic constipation (3-6 days at times at day with firm stools at times) with some fecal incontinence which only occurs with exercise after she had a bowel movement prior.  Mostly fecal smearing.  Patient recalls that her issues with bowel movements started after using nonprescription opiates years ago, no longer uses.    Patient used an enema and had a BM today, no diarhea/constiaton today but messy needing lots of toilet paper.  Reports improvement but still with mild discomfort with eating.  Denies nausea, vomiting, fever or chills.    Current Outpatient Medications Ordered in Epic   Medication Sig Dispense Refill    zolpidem (AMBIEN) 5 MG Tab       propranolol CR (INDERAL LA) 120 MG CAPSULE SR 24 HR TAKE 1 CAPSULE BY MOUTH EVERY DAY 90 Capsule 3    losartan (COZAAR) 100 MG Tab Take 1 Tablet by mouth every day. 90 Tablet 3    amitriptyline (ELAVIL) 50 MG Tab Take 1 Tablet by mouth every evening. 90 Tablet 3     No current Epic-ordered facility-administered  "medications on file.     ROS:  Gen: no fevers/chills, no changes in weight  Eyes: no changes in vision  ENT: no sore throat  Pulm: no sob, no cough  CV: no chest pain, no palpitations  GI: no nausea/vomiting, no diarrhea  : no dysuria  MSk: no myalgias  Skin: no rash  Neuro: no headaches, no numbness/tingling  Heme/Lymph: no easy bruising    Objective:     Exam:  BP (!) 138/90 (BP Location: Left arm, Patient Position: Sitting, BP Cuff Size: Adult)   Pulse 74   Temp 37.4 °C (99.4 °F) (Temporal)   Resp 14   Ht 1.676 m (5' 6\")   Wt 74.4 kg (164 lb)   SpO2 95%   BMI 26.47 kg/m²  Body mass index is 26.47 kg/m².    Physical Exam:  Constitutional: Well-developed and well-nourished. No acute distress.   Skin: Skin is warm and dry. No rash noted.  Head: Atraumatic without lesions.  Eyes: Conjunctivae and extraocular motions are normal. Pupils are equal, round. No scleral icterus.   Nose: Nares patent.  No discharge.  Mouth/Throat: Oropharynx is moist.   Neck: Supple, trachea midline.   Cardiovascular: Regular rate and rhythm, S1 and S2 without murmur, rubs, or gallops.  Lungs: Normal inspiratory effort, CTA bilaterally, no wheezes/rhonchi/rales  Abdomen: Soft, non tender, and without distention. Active bowel sounds. No rebound, guarding, masses or HSM but stool burden palpable.   Extremities: No cyanosis, clubbing, erythema, nor edema.  Neurological: Alert and oriented x 3. No gross/focal deficits.  Psychiatric:  Behavior, mood, and affect are appropriate.    Labs: Reviewed from 9/27/2022, 4/23/2023  Imaging: Reviewed from 4/23/2023  IMPRESSION:  1.  No cholelithiasis or biliary dilatation.  2.  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 female with the following -     1. Chronic constipation  2. Abdominal bloating  3. Upper abdominal pain  - GU-[id number]-2 VIEW; Future  4. Mucus in stool  5. Incontinence of feces, unspecified fecal incontinence type  - " Referral to Gastroenterology  Improved abdominal bloating and upper abdominal pain in this patient with reported chronic constipation with occasional mucus in her stool and fecal incontinence.  Patient has been taking amitriptyline 50 mg chronically and discussed that this may be contributing to her constipation.  Patient interested in decreasing to 25 mg nightly.  Discussed importance of adequate hydration, exercise and fiber.  Discussed how to titrate MiraLAX to daily soft stools.  Given her symptoms as above also discussed considering bowel cleanse with MiraLAX then thereafter starting with daily MiraLAX to prevent recurrence of constipation.  Given chronicity will also have patient evaluated by gastroenterology, history of nonprescription opiate use may have contributed and denies current use.    I spent a total of 30 minutes with record review, exam, communication with the patient, and documentation of this encounter.    Return in about 4 weeks (around 5/23/2023), or if symptoms worsen or fail to improve.    Please note that this dictation was created using voice recognition software. I have made every reasonable attempt to correct obvious errors, but I expect that there are errors of grammar and possibly content that I did not discover before finalizing the note.

## 2023-05-18 ENCOUNTER — OFFICE VISIT (OUTPATIENT)
Dept: MEDICAL GROUP | Facility: PHYSICIAN GROUP | Age: 53
End: 2023-05-18
Payer: OTHER GOVERNMENT

## 2023-05-18 VITALS
HEIGHT: 66 IN | RESPIRATION RATE: 20 BRPM | SYSTOLIC BLOOD PRESSURE: 150 MMHG | TEMPERATURE: 98.2 F | WEIGHT: 169 LBS | HEART RATE: 75 BPM | OXYGEN SATURATION: 97 % | BODY MASS INDEX: 27.16 KG/M2 | DIASTOLIC BLOOD PRESSURE: 100 MMHG

## 2023-05-18 DIAGNOSIS — I10 ESSENTIAL HYPERTENSION: ICD-10-CM

## 2023-05-18 DIAGNOSIS — H35.09 ABNORMAL RETINAL VESSELS IN RIGHT EYE: ICD-10-CM

## 2023-05-18 DIAGNOSIS — K59.09 CHRONIC CONSTIPATION: ICD-10-CM

## 2023-05-18 PROBLEM — R10.10 UPPER ABDOMINAL PAIN: Status: RESOLVED | Noted: 2023-04-25 | Resolved: 2023-05-18

## 2023-05-18 PROBLEM — R14.0 ABDOMINAL BLOATING: Status: RESOLVED | Noted: 2023-04-25 | Resolved: 2023-05-18

## 2023-05-18 PROCEDURE — 99214 OFFICE O/P EST MOD 30 MIN: CPT | Performed by: STUDENT IN AN ORGANIZED HEALTH CARE EDUCATION/TRAINING PROGRAM

## 2023-05-18 PROCEDURE — 3077F SYST BP >= 140 MM HG: CPT | Performed by: STUDENT IN AN ORGANIZED HEALTH CARE EDUCATION/TRAINING PROGRAM

## 2023-05-18 PROCEDURE — 3080F DIAST BP >= 90 MM HG: CPT | Performed by: STUDENT IN AN ORGANIZED HEALTH CARE EDUCATION/TRAINING PROGRAM

## 2023-05-18 ASSESSMENT — FIBROSIS 4 INDEX: FIB4 SCORE: 0.78

## 2023-05-18 NOTE — PROGRESS NOTES
"Subjective:     Chief Complaint   Patient presents with    Follow-Up     bloating     HPI:   Asher presents today for follow-up.    Patient states that she used MiraLAX once daily for 4 days and felt like on the fourth day cleared all the stool out.  Feels much improved without pain or bloating any longer.  Has been taking a supplement which is working great and keeping her bowel movements regular and soft (gallbladder formula by Dr. Preston).  Patient mentions now that she has had 2 episodes of painless rectal bleeding with spotting just on the toilet paper.  States that mucus and fecal incontinence have resolved since using MiraLAX.    Reports that she saw her optometrist for blurry vision in the right eye and was recommended to see a neuro-ophthalmologist given the findings of a pale retina.  Has not been checking her blood pressure at home.    Review of Systems   Constitutional:  Positive for malaise/fatigue. Negative for chills, fever and weight loss.   Eyes:  Positive for blurred vision. Negative for double vision, photophobia, pain, discharge and redness.   Cardiovascular:  Negative for chest pain, palpitations and leg swelling.   Gastrointestinal:  Positive for blood in stool. Negative for abdominal pain, constipation, diarrhea, heartburn, melena, nausea and vomiting.   Neurological:  Negative for dizziness and headaches.   Psychiatric/Behavioral:  The patient has insomnia.      Objective:     Exam:  BP (!) 150/100 (BP Location: Left arm, Patient Position: Sitting, BP Cuff Size: Adult)   Pulse 75   Temp 36.8 °C (98.2 °F) (Temporal)   Resp 20   Ht 1.676 m (5' 6\")   Wt 76.7 kg (169 lb)   SpO2 97%   BMI 27.28 kg/m²  Body mass index is 27.28 kg/m².    Physical Exam  Vitals reviewed.   Constitutional:       General: She is not in acute distress.     Appearance: Normal appearance. She is not ill-appearing.   HENT:      Head: Normocephalic and atraumatic.      Nose: Nose normal.      Mouth/Throat:      Mouth: " Mucous membranes are moist.   Eyes:      Conjunctiva/sclera: Conjunctivae normal.   Cardiovascular:      Rate and Rhythm: Normal rate and regular rhythm.      Heart sounds: Normal heart sounds. No murmur heard.  Pulmonary:      Effort: Pulmonary effort is normal. No respiratory distress.      Breath sounds: Normal breath sounds. No stridor. No wheezing, rhonchi or rales.   Abdominal:      General: Abdomen is flat. Bowel sounds are normal. There is no distension.      Palpations: Abdomen is soft. There is no mass.      Tenderness: There is no abdominal tenderness. There is no guarding or rebound.   Musculoskeletal:      Cervical back: Normal range of motion and neck supple. No rigidity or tenderness.      Right lower leg: No edema.      Left lower leg: No edema.   Skin:     General: Skin is warm and dry.   Neurological:      General: No focal deficit present.      Mental Status: She is alert and oriented to person, place, and time.   Psychiatric:         Mood and Affect: Mood normal.         Behavior: Behavior normal.         Thought Content: Thought content normal.       Labs: Reviewed from 9/27/2022    Assessment & Plan:     52 y.o. female with the following -     1. Chronic constipation  Chronic, controlled with resolved pain/bloating. If painless bleeding persists despite controlled constipation, plan for exam. Has active referral to GI, advised to make an appt.    2. Essential hypertension  Chronic, uncontrolled. Continue below medication(s) and patient will add back HCTZ 12.5mg daily-will consider increase to 25 mg daily but will need to repeat her electrolyte. Discussed ideal ranges/return precautions.    propranolol CR (INDERAL LA) 120 MG CAPSULE SR 24 HR, TAKE 1 CAPSULE BY MOUTH EVERY DAY, Disp: 90 Capsule, Rfl: 3    losartan (COZAAR) 100 MG Tab, Take 1 Tablet by mouth every day., Disp: 90 Tablet, Rfl: 3    3. 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  - Referral to Neuroopthamology    Return in about 3 weeks (around 6/8/2023), or if symptoms worsen or fail to improve, for Blood pressure.    Please note that this dictation was created using voice recognition software. I have made every reasonable attempt to correct obvious errors, but I expect that there are errors of grammar and possibly content that I did not discover before finalizing the note.

## 2023-05-18 NOTE — PATIENT INSTRUCTIONS
Ideal blood pressure <130/80 and at least <140/90.    Restart 12.5mg hydrochlorothiazide in addition to your current blood pressure regimen.

## 2023-05-21 ASSESSMENT — ENCOUNTER SYMPTOMS
EYE REDNESS: 0
PHOTOPHOBIA: 0
HEARTBURN: 0
VOMITING: 0
CHILLS: 0
FEVER: 0
WEIGHT LOSS: 0
DIARRHEA: 0
ABDOMINAL PAIN: 0
DIZZINESS: 0
EYE DISCHARGE: 0
NAUSEA: 0
INSOMNIA: 1
HEADACHES: 0
BLOOD IN STOOL: 1
DOUBLE VISION: 0
CONSTIPATION: 0
PALPITATIONS: 0
EYE PAIN: 0
BLURRED VISION: 1

## 2023-05-30 DIAGNOSIS — H35.09 ABNORMAL RETINAL VESSELS IN RIGHT EYE: ICD-10-CM

## 2023-06-08 ENCOUNTER — OFFICE VISIT (OUTPATIENT)
Dept: MEDICAL GROUP | Facility: PHYSICIAN GROUP | Age: 53
End: 2023-06-08
Payer: OTHER GOVERNMENT

## 2023-06-08 VITALS
BODY MASS INDEX: 27.16 KG/M2 | WEIGHT: 169 LBS | OXYGEN SATURATION: 97 % | DIASTOLIC BLOOD PRESSURE: 108 MMHG | HEART RATE: 74 BPM | TEMPERATURE: 99 F | SYSTOLIC BLOOD PRESSURE: 148 MMHG | HEIGHT: 66 IN | RESPIRATION RATE: 20 BRPM

## 2023-06-08 DIAGNOSIS — Z11.59 NEED FOR HEPATITIS C SCREENING TEST: ICD-10-CM

## 2023-06-08 DIAGNOSIS — Z85.820 HISTORY OF MELANOMA: ICD-10-CM

## 2023-06-08 DIAGNOSIS — E78.41 ELEVATED LIPOPROTEIN(A): ICD-10-CM

## 2023-06-08 DIAGNOSIS — I10 ESSENTIAL HYPERTENSION: ICD-10-CM

## 2023-06-08 PROCEDURE — 3080F DIAST BP >= 90 MM HG: CPT | Performed by: STUDENT IN AN ORGANIZED HEALTH CARE EDUCATION/TRAINING PROGRAM

## 2023-06-08 PROCEDURE — 3077F SYST BP >= 140 MM HG: CPT | Performed by: STUDENT IN AN ORGANIZED HEALTH CARE EDUCATION/TRAINING PROGRAM

## 2023-06-08 PROCEDURE — 99214 OFFICE O/P EST MOD 30 MIN: CPT | Performed by: STUDENT IN AN ORGANIZED HEALTH CARE EDUCATION/TRAINING PROGRAM

## 2023-06-08 RX ORDER — PROPRANOLOL HYDROCHLORIDE 160 MG/1
160 CAPSULE, EXTENDED RELEASE ORAL DAILY
Qty: 90 CAPSULE | Refills: 3 | Status: SHIPPED | OUTPATIENT
Start: 2023-06-08 | End: 2023-09-07

## 2023-06-08 ASSESSMENT — ENCOUNTER SYMPTOMS
VOMITING: 0
DIARRHEA: 0
CHILLS: 0
WEIGHT LOSS: 0
CONSTIPATION: 1
FEVER: 0
COUGH: 0
PALPITATIONS: 0
NAUSEA: 0
ABDOMINAL PAIN: 0
SHORTNESS OF BREATH: 0

## 2023-06-08 ASSESSMENT — FIBROSIS 4 INDEX: FIB4 SCORE: 0.78

## 2023-06-08 NOTE — PATIENT INSTRUCTIONS
Ideal blood pressure <130/80 and at least <140/90.  If <100 for the top number we are over treating.    Moderate intensity exercise (goal 150 minutes a week, ex 30 minutes 5 times a week)

## 2023-06-08 NOTE — ASSESSMENT & PLAN NOTE
Went on a cruise recently and did not start hydrochlorothiazide until yesterday.  Took 12.5mg yesterday and caused urinary frequency.  Took a 1/4 today.  Continues with her losartan 100 mg daily and propranolol 120 mg SR daily.  Has not been checking her blood pressure at home.

## 2023-06-08 NOTE — PROGRESS NOTES
"Subjective:     Chief Complaint   Patient presents with    Hypertension Follow-up    Other     RUE skin concern     HPI:   Asher presents today with     Essential hypertension  Went on a cruise recently and did not start hydrochlorothiazide until yesterday.  Took 12.5mg yesterday and caused urinary frequency.  Took a 1/4 today.  Continues with her losartan 100 mg daily and propranolol 120 mg SR daily.  Has not been checking her blood pressure at home.    Patient has concern for a new bump on her right upper arm.  Patient reports that she does have a history of melanoma and followed by dermatology every 6 months.  Admits to lack of sunscreen and actually tanning outside.    Review of Systems   Constitutional:  Negative for chills, fever, malaise/fatigue and weight loss.   Respiratory:  Negative for cough and shortness of breath.    Cardiovascular:  Positive for leg swelling. Negative for chest pain and palpitations.   Gastrointestinal:  Positive for constipation. Negative for abdominal pain, diarrhea, nausea and vomiting.        Short episode of constipation on the cruise, now resolved.   Genitourinary:  Positive for frequency and urgency. Negative for dysuria and hematuria.   Skin:  Negative for itching and rash.     Objective:     Exam:  BP (!) 148/108 (BP Location: Left arm, Patient Position: Sitting, BP Cuff Size: Adult)   Pulse 74   Temp 37.2 °C (99 °F) (Temporal)   Resp 20   Ht 1.676 m (5' 6\")   Wt 76.7 kg (169 lb)   SpO2 97%   BMI 27.28 kg/m²  Body mass index is 27.28 kg/m².    Physical Exam  Vitals reviewed.   Constitutional:       General: She is not in acute distress.     Appearance: Normal appearance. She is not ill-appearing.   HENT:      Head: Normocephalic and atraumatic.      Nose: Nose normal.      Mouth/Throat:      Mouth: Mucous membranes are moist.   Eyes:      Conjunctiva/sclera: Conjunctivae normal.   Cardiovascular:      Rate and Rhythm: Normal rate and regular rhythm.      Heart " sounds: Normal heart sounds. No murmur heard.  Pulmonary:      Effort: Pulmonary effort is normal. No respiratory distress.      Breath sounds: Normal breath sounds. No stridor. No wheezing, rhonchi or rales.   Musculoskeletal:      Cervical back: Normal range of motion and neck supple. No rigidity or tenderness.      Right lower leg: No edema.      Left lower leg: Edema present.      Comments: Minimal mid shin pitting edema, otherwise none   Skin:     General: Skin is warm and dry.      Comments: No abnormalities of the skin in RUE   Neurological:      General: No focal deficit present.      Mental Status: She is alert and oriented to person, place, and time.   Psychiatric:         Mood and Affect: Mood normal.         Behavior: Behavior normal.         Thought Content: Thought content normal.       Labs: Reviewed from 9/27/2022    Assessment & Plan:     52 y.o. female with the following -     1. Essential hypertension  Chronic, uncontrolled.  Patient to work on decreasing her sodium intake which may be contributing and continue healthy diet with exercise as tolerated.  We should stop hydrochlorothiazide due to side effect of urinary frequency.  Unable to tolerate amlodipine due to edema.  Will increase propranolol to 160 mg CR daily and continue losartan 100 mg daily.  Discussed ideal ranges/return precautions.  May benefit from pharmacy evaluation/treatment to help control her blood pressure given that she has had bad effects to now a few classes of medication.  Discussed that we will need to check her thyroid with this higher dose of propanolol on a periodic basis.  - Referral to Pharmacotherapy Service  - propranolol CR (INDERAL LA) 160 MG CAPSULE SR 24 HR capsule; Take 1 Capsule by mouth every day.  Dispense: 90 Capsule; Refill: 3  - Comp Metabolic Panel; Future  - CBC WITH DIFFERENTIAL; Future  - TSH WITH REFLEX TO FT4; Future    2. History of melanoma  No abnormalities noted on skin exam and area of concern.   Stressed importance of avoiding tanning and wearing sunscreen.  Follow-up with dermatology as directed.    3. Elevated lipoprotein(a)  Chronic, trend prior to follow up.  Continue healthy diet and exercise.  Plan pending results.  - Comp Metabolic Panel; Future  - Lipid Profile; Future  - Lipoprotein (a); Future    4. Need for hepatitis C screening test  - HEP C VIRUS ANTIBODY; Future    Return in about 3 months (around 9/8/2023), or if symptoms worsen or fail to improve, for Blood pressure/labsts.    Please note that this dictation was created using voice recognition software. I have made every reasonable attempt to correct obvious errors, but I expect that there are errors of grammar and possibly content that I did not discover before finalizing the note.

## 2023-06-26 ENCOUNTER — DOCUMENTATION (OUTPATIENT)
Dept: VASCULAR LAB | Facility: MEDICAL CENTER | Age: 53
End: 2023-06-26
Payer: OTHER GOVERNMENT

## 2023-06-26 ENCOUNTER — APPOINTMENT (OUTPATIENT)
Dept: VASCULAR LAB | Facility: MEDICAL CENTER | Age: 53
End: 2023-06-26
Attending: INTERNAL MEDICINE
Payer: OTHER GOVERNMENT

## 2023-06-26 NOTE — PROGRESS NOTES
[facility]      Called and left msg for pt to call back to establish care regarding pharmacotherapy referral for HTN from Dr. Marsha Christianson on 6/8/23      LVM with the patient requesting she call the clinic back to schedule initial appt with our clinic.     Insurance: Eliel  PCP: Samantha = Dr. Marsha Christianson  Locations to be seen: ANY     Phone number left for return call or any questions or concerns.  [facility] at [phone], fax [phone]      Yashira PatelD

## 2023-07-05 ENCOUNTER — DOCUMENTATION (OUTPATIENT)
Dept: VASCULAR LAB | Facility: MEDICAL CENTER | Age: 53
End: 2023-07-05
Payer: OTHER GOVERNMENT

## 2023-07-05 NOTE — PROGRESS NOTES
[facility]    Received HTN clinic referral from Marsha Christianson D.O. on 6-8-23    LM with patient to c/b to establish care.  Silvigen message sent as well.    Insurance: Eliel  PCP: [facility]  Locations to be seen: Any    [facility] at [phone], fax [phone]    Gurwinder Adams, AmandaD, BCACP

## 2023-07-06 ENCOUNTER — NON-PROVIDER VISIT (OUTPATIENT)
Dept: MEDICAL GROUP | Facility: PHYSICIAN GROUP | Age: 53
End: 2023-07-06
Payer: OTHER GOVERNMENT

## 2023-07-06 VITALS
WEIGHT: 169 LBS | DIASTOLIC BLOOD PRESSURE: 100 MMHG | BODY MASS INDEX: 27.28 KG/M2 | SYSTOLIC BLOOD PRESSURE: 140 MMHG | HEART RATE: 70 BPM

## 2023-07-06 PROCEDURE — 99403 PREV MED CNSL INDIV APPRX 45: CPT | Performed by: FAMILY MEDICINE

## 2023-07-06 RX ORDER — FELODIPINE 2.5 MG/1
2.5 TABLET, EXTENDED RELEASE ORAL DAILY
Qty: 30 TABLET | Refills: 3 | Status: SHIPPED | OUTPATIENT
Start: 2023-07-06 | End: 2023-08-03

## 2023-07-06 ASSESSMENT — FIBROSIS 4 INDEX: FIB4 SCORE: 0.78

## 2023-07-06 NOTE — PROGRESS NOTES
Patient Consult Note     TIME IN: 3pm   TIME OUT: 347pm       Primary care physician: Marsha Christianson D.O.    Reason for consult: HTN and lipids     HPI:  Asher James is a 52 y.o. old patient who comes in today for evaluation of above stated problem.    Allergies:  Hydrochlorothiazide    Physical Examination:  Vital signs: There were no vitals taken for this visit. There is no height or weight on file to calculate BMI.    Most Recent labs, A1c, and immunizations:    Lab Results   Component Value Date/Time    HBA1C 5.6 06/10/2020 12:47 PM          Lab Results   Component Value Date/Time    CHOLSTRLTOT 241 (H) 09/27/2022 08:48 AM     (H) 09/27/2022 08:48 AM    HDL 58 09/27/2022 08:48 AM    TRIGLYCERIDE 81 09/27/2022 08:48 AM        Latest Reference Range & Units 05/24/21 07:41 09/27/22 08:48   Lipoprotein (a) <=29 mg/dL 25 38 (H)   (H): Data is abnormally high      Lab Results   Component Value Date/Time    SODIUM 142 09/27/2022 08:48 AM    POTASSIUM 4.1 09/27/2022 08:48 AM    CHLORIDE 108 09/27/2022 08:48 AM    CO2 22 09/27/2022 08:48 AM    GLUCOSE 87 09/27/2022 08:48 AM    BUN 14 09/27/2022 08:48 AM    CREATININE 0.89 09/27/2022 08:48 AM     Lab Results   Component Value Date/Time    ALKPHOSPHAT 115 (H) 09/27/2022 08:48 AM    ASTSGOT 11 (L) 09/27/2022 08:48 AM    ALTSGPT 9 09/27/2022 08:48 AM    TBILIRUBIN 0.3 09/27/2022 08:48 AM    ALBUMIN 4.4 09/27/2022 08:48 AM      Lab Results   Component Value Date/Time    MALBCRT see below 05/24/2021 07:42 AM    MICROALBUR <1.2 05/24/2021 07:42 AM       There is no immunization history on file for this patient.    Assessment and Plan:    Cardiovascular  Is LDL <100-130: no   Declined statin due to fears of dementia in family members.   Declined Zetia   Sounds like some family members have premature ASCVD and might have FH  Elevated Lipoprotein (a)  Is Blood pressure <130/80:  no  She has previously used hydrochlorothiazide (HCTZ) but experienced  frequent urination every 20-30 minutes.  She has tried amlodipine but developed edema. She is willing to try felodipine at a low dose on Mondays, Wednesdays, and Fridays, and increase to a daily dose if tolerated.  Due to her current use of propranolol for blood pressure and headaches, she cannot take diltiazem or verapamil. Increasing the dose of propranolol caused her heart rate to drop too low, leading to feelings of almost fainting.  Amitriptyline is used for sleep, anxiety, and headaches but may increase heart rate and blood pressure due to its anticholinergic effects.  She has tolerated an angiotensin receptor blocker (ARB) but had to discontinue lisinopril in the past due to a dry cough.  Spironolactone could be considered, but she is currently experiencing vasomotor symptoms due to menopause and is working with her doctor to explore hormone replacement therapy (HRT) options and dosages.   May also want to use Prazosin if needed for BP and sleep/anxiety   She is interested in starting a keto diet and intermittent fasting. I referred her to nutritionfacts.org for evidence-based information on diet's impact on blood pressure, cancer, diabetes, etc.  She was seen by Dr. Bloch on 3/2/21.   Declined any clinic BP trial   Medication(s) recommended.   Start Felodipine ER 2.5mg MWF to start then daily if able.   Continue with propranolol CR at 160mg daily   Continue with losartan 100mg daily       3.  Lifestyle changes   Focus on eating a DASH/Mediterranean-style diet.   Exercise 30 minutes daily at least 5 days/week, as tolerated.  https://www.niddk.nih.gov/bwp        Follow-up appointment in 4 week(s)    Yvon Barrera, PharmD, MS, BCACP, C  [facility]  Phone [phone] fax [phone]    This note was created using voice recognition software (Dragon). The accuracy of the dictation is limited by the abilities of the software. I have reviewed the note prior to signing,  however some errors in grammar and context are still possible. If you have any questions related to this note please do not hesitate to contact our office.     CC:   Marsha Christianson D.O.  Sammy, Marsha FRENCH D.O.  Dr. Ricardo Cormier

## 2023-07-06 NOTE — PATIENT INSTRUCTIONS
Yvon Barrera, PharmD, MS, BCACP, LCC  [facility]  Phone: [phone]  Fax: [phone]  On call: [phone]  General scheduling/information [phone]  For emergencies please dial 911  Please do not use [employer] for urgent matters, call the phone numbers listed above.  [employer] messages are answered Monday through Friday.     This note was created using voice recognition software (Dragon). The accuracy of the dictation is limited by the abilities of the software. I have reviewed the note prior to signing, however some errors in grammar and context are still possible. If you have any questions related to this note please do not hesitate to contact our office.

## 2023-07-13 ENCOUNTER — OFFICE VISIT (OUTPATIENT)
Dept: URGENT CARE | Facility: PHYSICIAN GROUP | Age: 53
End: 2023-07-13
Payer: OTHER GOVERNMENT

## 2023-07-13 VITALS
DIASTOLIC BLOOD PRESSURE: 92 MMHG | WEIGHT: 165 LBS | SYSTOLIC BLOOD PRESSURE: 156 MMHG | HEIGHT: 66 IN | HEART RATE: 72 BPM | BODY MASS INDEX: 26.52 KG/M2 | OXYGEN SATURATION: 99 % | RESPIRATION RATE: 16 BRPM | TEMPERATURE: 98.3 F

## 2023-07-13 DIAGNOSIS — N30.01 ACUTE CYSTITIS WITH HEMATURIA: ICD-10-CM

## 2023-07-13 LAB
APPEARANCE UR: CLEAR
BILIRUB UR STRIP-MCNC: NORMAL MG/DL
COLOR UR AUTO: YELLOW
GLUCOSE UR STRIP.AUTO-MCNC: NORMAL MG/DL
KETONES UR STRIP.AUTO-MCNC: NORMAL MG/DL
LEUKOCYTE ESTERASE UR QL STRIP.AUTO: NORMAL
NITRITE UR QL STRIP.AUTO: NORMAL
PH UR STRIP.AUTO: 6 [PH] (ref 5–8)
PROT UR QL STRIP: NORMAL MG/DL
RBC UR QL AUTO: NORMAL
SP GR UR STRIP.AUTO: <=1.005
UROBILINOGEN UR STRIP-MCNC: 0.2 MG/DL

## 2023-07-13 PROCEDURE — 99213 OFFICE O/P EST LOW 20 MIN: CPT | Performed by: PHYSICIAN ASSISTANT

## 2023-07-13 PROCEDURE — 3080F DIAST BP >= 90 MM HG: CPT | Performed by: PHYSICIAN ASSISTANT

## 2023-07-13 PROCEDURE — 81002 URINALYSIS NONAUTO W/O SCOPE: CPT | Performed by: PHYSICIAN ASSISTANT

## 2023-07-13 PROCEDURE — 3077F SYST BP >= 140 MM HG: CPT | Performed by: PHYSICIAN ASSISTANT

## 2023-07-13 RX ORDER — CEFDINIR 300 MG/1
300 CAPSULE ORAL 2 TIMES DAILY
Qty: 14 CAPSULE | Refills: 0 | Status: SHIPPED | OUTPATIENT
Start: 2023-07-13 | End: 2023-07-20

## 2023-07-13 ASSESSMENT — ENCOUNTER SYMPTOMS
ABDOMINAL PAIN: 0
CARDIOVASCULAR NEGATIVE: 1
FLANK PAIN: 0
NAUSEA: 0
CONSTITUTIONAL NEGATIVE: 1
VOMITING: 0
RESPIRATORY NEGATIVE: 1
DIARRHEA: 0

## 2023-07-13 ASSESSMENT — FIBROSIS 4 INDEX: FIB4 SCORE: 0.78

## 2023-07-13 NOTE — PROGRESS NOTES
"  Subjective:     Asher James  is a 52 y.o. female who presents for Painful Urination (X1 day, Stinging pressure ) and Urinary Frequency (X1 day )       He presents today with painful urination as well as urinary frequency and urgency over the past day.  Does have previous history of UTIs, current symptoms do feel similar to those previously experienced.  States that she has been getting UTIs more frequently since she has started menopause.  No vaginal pain bleeding discharge.  No fevers, no flank pain.       Review of Systems   Constitutional: Negative.    Respiratory: Negative.     Cardiovascular: Negative.    Gastrointestinal:  Negative for abdominal pain, diarrhea, nausea and vomiting.   Genitourinary:  Positive for dysuria, frequency and urgency. Negative for flank pain and hematuria.      Allergies   Allergen Reactions    Hydrochlorothiazide Unspecified     Urinary frequency     Past Medical History:   Diagnosis Date    Abdominal bloating 4/25/2023    Cancer (HCC)     back    Head ache     Hyperlipidemia     Hypertension     Hyponatremia 8/30/2022    Migraine     Upper abdominal pain 4/25/2023        Objective:   BP (!) 156/92 (BP Location: Right arm, Patient Position: Sitting, BP Cuff Size: Adult)   Pulse 72   Temp 36.8 °C (98.3 °F) (Temporal)   Resp 16   Ht 1.676 m (5' 6\")   Wt 74.8 kg (165 lb)   SpO2 99%   BMI 26.63 kg/m²   Physical Exam  Vitals and nursing note reviewed.   Constitutional:       General: She is not in acute distress.     Appearance: She is not ill-appearing or toxic-appearing.   HENT:      Head: Normocephalic.   Eyes:      General: No scleral icterus.     Conjunctiva/sclera: Conjunctivae normal.   Cardiovascular:      Rate and Rhythm: Normal rate and regular rhythm.   Pulmonary:      Effort: Pulmonary effort is normal. No respiratory distress.      Breath sounds: Normal breath sounds. No stridor.   Abdominal:      General: Abdomen is flat. Bowel sounds are normal. " There is no distension.      Palpations: Abdomen is soft.      Tenderness: There is no abdominal tenderness. There is no right CVA tenderness, left CVA tenderness or guarding.   Musculoskeletal:      Cervical back: Neck supple.   Neurological:      Mental Status: She is alert and oriented to person, place, and time.   Psychiatric:         Mood and Affect: Mood normal.         Behavior: Behavior normal.         Thought Content: Thought content normal.         Judgment: Judgment normal.             Diagnostic testing:    POC urinalysis-moderate blood, small leukocytes, all others within normal limits    Assessment/Plan:     Encounter Diagnoses   Name Primary?    Acute cystitis with hematuria           Plan for care for today's complaint includes starting patient on cefdinir for acute cystitis based on symptom presentation and urinalysis findings suggestive of UTI.  No evidence of pyelonephritis on exam today.  Continue to monitor symptoms and return to urgent care or follow-up with primary care provider if symptoms remain ongoing.  Follow-up in the emergency department if symptoms become severe, ER precautions discussed in office today..  Prescription for cefdinir provided.    See AVS Instructions below for written guidance provided to patient on after-visit management and care in addition to our verbal discussion during the visit.    Please note that this dictation was created using voice recognition software. I have attempted to correct all errors, but there may be sound-alike, spelling, grammar and possibly content errors that I did not discover before finalizing the note.    Regan Rodríguez PA-C

## 2023-08-01 ENCOUNTER — HOSPITAL ENCOUNTER (OUTPATIENT)
Dept: RADIOLOGY | Facility: MEDICAL CENTER | Age: 53
End: 2023-08-01
Attending: CHIROPRACTOR
Payer: OTHER GOVERNMENT

## 2023-08-01 DIAGNOSIS — M99.01 CERVICAL SEGMENT DYSFUNCTION: ICD-10-CM

## 2023-08-01 PROCEDURE — 72050 X-RAY EXAM NECK SPINE 4/5VWS: CPT

## 2023-08-03 ENCOUNTER — NON-PROVIDER VISIT (OUTPATIENT)
Dept: MEDICAL GROUP | Facility: PHYSICIAN GROUP | Age: 53
End: 2023-08-03
Payer: OTHER GOVERNMENT

## 2023-08-03 VITALS — SYSTOLIC BLOOD PRESSURE: 133 MMHG | DIASTOLIC BLOOD PRESSURE: 84 MMHG | HEART RATE: 65 BPM

## 2023-08-03 DIAGNOSIS — E78.01 FAMILIAL HYPERCHOLESTEREMIA: ICD-10-CM

## 2023-08-03 DIAGNOSIS — E78.41 ELEVATED LIPOPROTEIN(A): ICD-10-CM

## 2023-08-03 DIAGNOSIS — I10 ESSENTIAL HYPERTENSION: ICD-10-CM

## 2023-08-03 PROCEDURE — 99403 PREV MED CNSL INDIV APPRX 45: CPT | Performed by: FAMILY MEDICINE

## 2023-08-03 RX ORDER — AMITRIPTYLINE HYDROCHLORIDE 50 MG/1
50 TABLET, FILM COATED ORAL NIGHTLY
COMMUNITY
End: 2023-11-07 | Stop reason: DRUGHIGH

## 2023-08-03 NOTE — PROGRESS NOTES
Patient Consult Note     TIME IN: 3pm   TIME OUT: 344pm       Primary care physician: Marsha Christianson D.O.    Reason for consult: HTN and lipids     HPI:  Asher James is a 52 y.o. old patient who comes in today for evaluation of above stated problem.    Allergies:  Hydrochlorothiazide    Physical Examination:  Vital signs: There were no vitals taken for this visit. There is no height or weight on file to calculate BMI.    Most Recent labs, A1c, and immunizations:    Lab Results   Component Value Date/Time    HBA1C 5.6 06/10/2020 12:47 PM          Lab Results   Component Value Date/Time    CHOLSTRLTOT 241 (H) 09/27/2022 08:48 AM     (H) 09/27/2022 08:48 AM    HDL 58 09/27/2022 08:48 AM    TRIGLYCERIDE 81 09/27/2022 08:48 AM        Latest Reference Range & Units 05/24/21 07:41 09/27/22 08:48   Lipoprotein (a) <=29 mg/dL 25 38 (H)   (H): Data is abnormally high      Lab Results   Component Value Date/Time    SODIUM 142 09/27/2022 08:48 AM    POTASSIUM 4.1 09/27/2022 08:48 AM    CHLORIDE 108 09/27/2022 08:48 AM    CO2 22 09/27/2022 08:48 AM    GLUCOSE 87 09/27/2022 08:48 AM    BUN 14 09/27/2022 08:48 AM    CREATININE 0.89 09/27/2022 08:48 AM     Lab Results   Component Value Date/Time    ALKPHOSPHAT 115 (H) 09/27/2022 08:48 AM    ASTSGOT 11 (L) 09/27/2022 08:48 AM    ALTSGPT 9 09/27/2022 08:48 AM    TBILIRUBIN 0.3 09/27/2022 08:48 AM    ALBUMIN 4.4 09/27/2022 08:48 AM      Lab Results   Component Value Date/Time    MALBCRT see below 05/24/2021 07:42 AM    MICROALBUR <1.2 05/24/2021 07:42 AM     Home BP     Assessment and Plan:    Cardiovascular  Is LDL <100-130: no   Declined statin due to fears of dementia in family members.   Declined Zetia   Sounds like some family members have premature ASCVD and might have FH  Elevated Lipoprotein (a)  Is Blood pressure <130/80:  no  Home /88 HR72, 129/81, 115/81, 127/87, 115/82, 106/74  She has previously used hydrochlorothiazide (HCTZ) but  experienced frequent urination every 20-30 minutes.  She has tried amlodipine but developed edema. She was willing to try felodipine at a low dose on Mondays, Wednesdays, and Fridays, but she got severe low BP and SOB and had to DC the medications.   Due to her current use of propranolol for blood pressure and headaches, she cannot take diltiazem or verapamil. Increasing the dose of propranolol caused her heart rate to drop too low, leading to feelings of almost fainting.  Amitriptyline is used for sleep, anxiety, and headaches but may increase heart rate and blood pressure due to its anticholinergic effects.  She has tolerated an angiotensin receptor blocker (ARB) but had to discontinue lisinopril in the past due to a dry cough.  Spironolactone could be considered, but she is currently experiencing vasomotor symptoms due to menopause and is working with her doctor to explore hormone replacement therapy (HRT) options and dosages.   May also want to use Prazosin if needed for BP and sleep/anxiety, she will ask her Psychiatrist if this is appropriate.   She is interested in starting a keto diet and intermittent fasting. I referred her to nutritionfacts.org for evidence-based information on diet's impact on blood pressure, cancer, diabetes, etc.  She was seen by Dr. Bloch on 3/2/21, and would like to see him again as she has some other medical issues that are out of my SOP.   Declined any clinic BP trial   Might have some white coat syndrome based on normal home BP and elevated BP in clinic and with DDS. Might benefit from a 24hrABPM., but overall her BP was near goal       Medication(s) recommended.   Stop Felodipine ER 2.5mg due to low BP and SOB  Continue with propranolol CR at 160mg daily   Continue with losartan 100mg daily     3.  Lifestyle changes   Focus on eating a DASH/Mediterranean-style diet.   Exercise 30 minutes daily at least 5 days/week, as tolerated.  https://www.niddk.nih.gov/bwp        Follow-up  appointment with Dr. Bloch.     Yvon Barrera, PharmD, MS, BCACP, [facility]  Phone [phone] fax [phone]    This note was created using voice recognition software (Dragon). The accuracy of the dictation is limited by the abilities of the software. I have reviewed the note prior to signing, however some errors in grammar and context are still possible. If you have any questions related to this note please do not hesitate to contact our office.     CC:   Marsha Christianson D.O.  Sammy, Marsha FRENCH D.O.  Dr. Ricardo Cormier

## 2023-08-03 NOTE — Clinical Note
Dr. Bloch,   You wanted me to ask her if she would like to follow up with you again.   Yes she would like to see you again for BP/lipids.   She has a lot of questions and I think she would benefit from another appt.   Thanks,  Dmitriy

## 2023-08-04 ENCOUNTER — DOCUMENTATION (OUTPATIENT)
Dept: VASCULAR LAB | Facility: MEDICAL CENTER | Age: 53
End: 2023-08-04
Payer: OTHER GOVERNMENT

## 2023-08-07 DIAGNOSIS — E78.41 ELEVATED LIPOPROTEIN(A): ICD-10-CM

## 2023-08-07 DIAGNOSIS — I10 ESSENTIAL HYPERTENSION: ICD-10-CM

## 2023-08-07 DIAGNOSIS — E78.01 FAMILIAL HYPERCHOLESTEREMIA: ICD-10-CM

## 2023-08-10 ENCOUNTER — DOCUMENTATION (OUTPATIENT)
Dept: VASCULAR LAB | Facility: MEDICAL CENTER | Age: 53
End: 2023-08-10
Payer: OTHER GOVERNMENT

## 2023-08-10 NOTE — PROGRESS NOTES
2nd attempt. Left message to schedule F/U visit with Dr Bloch.  Please schedule next available with Dr Bloch

## 2023-08-14 ENCOUNTER — HOSPITAL ENCOUNTER (OUTPATIENT)
Dept: LAB | Facility: MEDICAL CENTER | Age: 53
End: 2023-08-14
Attending: STUDENT IN AN ORGANIZED HEALTH CARE EDUCATION/TRAINING PROGRAM
Payer: OTHER GOVERNMENT

## 2023-08-14 DIAGNOSIS — Z11.59 NEED FOR HEPATITIS C SCREENING TEST: ICD-10-CM

## 2023-08-14 DIAGNOSIS — I10 ESSENTIAL HYPERTENSION: ICD-10-CM

## 2023-08-14 DIAGNOSIS — E78.41 ELEVATED LIPOPROTEIN(A): ICD-10-CM

## 2023-08-14 LAB
ALBUMIN SERPL BCP-MCNC: 4.5 G/DL (ref 3.2–4.9)
ALBUMIN/GLOB SERPL: 1.7 G/DL
ALP SERPL-CCNC: 93 U/L (ref 30–99)
ALT SERPL-CCNC: 7 U/L (ref 2–50)
ANION GAP SERPL CALC-SCNC: 11 MMOL/L (ref 7–16)
AST SERPL-CCNC: 12 U/L (ref 12–45)
BASOPHILS # BLD AUTO: 0.6 % (ref 0–1.8)
BASOPHILS # BLD: 0.03 K/UL (ref 0–0.12)
BILIRUB SERPL-MCNC: 0.4 MG/DL (ref 0.1–1.5)
BUN SERPL-MCNC: 13 MG/DL (ref 8–22)
CALCIUM ALBUM COR SERPL-MCNC: 9.3 MG/DL (ref 8.5–10.5)
CALCIUM SERPL-MCNC: 9.7 MG/DL (ref 8.5–10.5)
CHLORIDE SERPL-SCNC: 104 MMOL/L (ref 96–112)
CHOLEST SERPL-MCNC: 404 MG/DL (ref 100–199)
CO2 SERPL-SCNC: 24 MMOL/L (ref 20–33)
CREAT SERPL-MCNC: 0.89 MG/DL (ref 0.5–1.4)
EOSINOPHIL # BLD AUTO: 0.13 K/UL (ref 0–0.51)
EOSINOPHIL NFR BLD: 2.7 % (ref 0–6.9)
ERYTHROCYTE [DISTWIDTH] IN BLOOD BY AUTOMATED COUNT: 42.1 FL (ref 35.9–50)
FASTING STATUS PATIENT QL REPORTED: NORMAL
GFR SERPLBLD CREATININE-BSD FMLA CKD-EPI: 77 ML/MIN/1.73 M 2
GLOBULIN SER CALC-MCNC: 2.6 G/DL (ref 1.9–3.5)
GLUCOSE SERPL-MCNC: 89 MG/DL (ref 65–99)
HCT VFR BLD AUTO: 39.2 % (ref 37–47)
HCV AB SER QL: NORMAL
HDLC SERPL-MCNC: 44 MG/DL
HGB BLD-MCNC: 13 G/DL (ref 12–16)
IMM GRANULOCYTES # BLD AUTO: 0.01 K/UL (ref 0–0.11)
IMM GRANULOCYTES NFR BLD AUTO: 0.2 % (ref 0–0.9)
LDLC SERPL CALC-MCNC: 340 MG/DL
LYMPHOCYTES # BLD AUTO: 1.97 K/UL (ref 1–4.8)
LYMPHOCYTES NFR BLD: 40.2 % (ref 22–41)
MCH RBC QN AUTO: 30 PG (ref 27–33)
MCHC RBC AUTO-ENTMCNC: 33.2 G/DL (ref 32.2–35.5)
MCV RBC AUTO: 90.3 FL (ref 81.4–97.8)
MONOCYTES # BLD AUTO: 0.35 K/UL (ref 0–0.85)
MONOCYTES NFR BLD AUTO: 7.1 % (ref 0–13.4)
NEUTROPHILS # BLD AUTO: 2.41 K/UL (ref 1.82–7.42)
NEUTROPHILS NFR BLD: 49.2 % (ref 44–72)
NRBC # BLD AUTO: 0 K/UL
NRBC BLD-RTO: 0 /100 WBC (ref 0–0.2)
PLATELET # BLD AUTO: 302 K/UL (ref 164–446)
PMV BLD AUTO: 11 FL (ref 9–12.9)
POTASSIUM SERPL-SCNC: 4.1 MMOL/L (ref 3.6–5.5)
PROT SERPL-MCNC: 7.1 G/DL (ref 6–8.2)
RBC # BLD AUTO: 4.34 M/UL (ref 4.2–5.4)
SODIUM SERPL-SCNC: 139 MMOL/L (ref 135–145)
TRIGL SERPL-MCNC: 102 MG/DL (ref 0–149)
TSH SERPL DL<=0.005 MIU/L-ACNC: 2.62 UIU/ML (ref 0.38–5.33)
WBC # BLD AUTO: 4.9 K/UL (ref 4.8–10.8)

## 2023-08-14 PROCEDURE — 85025 COMPLETE CBC W/AUTO DIFF WBC: CPT

## 2023-08-14 PROCEDURE — 86803 HEPATITIS C AB TEST: CPT

## 2023-08-14 PROCEDURE — 80061 LIPID PANEL: CPT

## 2023-08-14 PROCEDURE — 80053 COMPREHEN METABOLIC PANEL: CPT

## 2023-08-14 PROCEDURE — 83695 ASSAY OF LIPOPROTEIN(A): CPT

## 2023-08-14 PROCEDURE — 36415 COLL VENOUS BLD VENIPUNCTURE: CPT

## 2023-08-14 PROCEDURE — 84443 ASSAY THYROID STIM HORMONE: CPT

## 2023-08-16 LAB — LPA SERPL-MCNC: 23 MG/DL

## 2023-09-07 ENCOUNTER — OFFICE VISIT (OUTPATIENT)
Dept: MEDICAL GROUP | Facility: PHYSICIAN GROUP | Age: 53
End: 2023-09-07
Payer: OTHER GOVERNMENT

## 2023-09-07 VITALS
HEART RATE: 74 BPM | DIASTOLIC BLOOD PRESSURE: 82 MMHG | WEIGHT: 167 LBS | TEMPERATURE: 98.3 F | BODY MASS INDEX: 26.84 KG/M2 | SYSTOLIC BLOOD PRESSURE: 152 MMHG | HEIGHT: 66 IN | RESPIRATION RATE: 20 BRPM | OXYGEN SATURATION: 98 %

## 2023-09-07 DIAGNOSIS — E78.00 HYPERCHOLESTEROLEMIA WITH LDL GREATER THAN 190 MG/DL: ICD-10-CM

## 2023-09-07 DIAGNOSIS — E78.01 FAMILIAL HYPERCHOLESTEREMIA: ICD-10-CM

## 2023-09-07 DIAGNOSIS — I10 ESSENTIAL HYPERTENSION: ICD-10-CM

## 2023-09-07 PROBLEM — R74.8 ELEVATED ALKALINE PHOSPHATASE LEVEL: Status: RESOLVED | Noted: 2022-08-30 | Resolved: 2023-09-07

## 2023-09-07 PROBLEM — E78.41 ELEVATED LIPOPROTEIN(A): Status: RESOLVED | Noted: 2023-06-08 | Resolved: 2023-09-07

## 2023-09-07 PROCEDURE — 3079F DIAST BP 80-89 MM HG: CPT | Performed by: STUDENT IN AN ORGANIZED HEALTH CARE EDUCATION/TRAINING PROGRAM

## 2023-09-07 PROCEDURE — 3077F SYST BP >= 140 MM HG: CPT | Performed by: STUDENT IN AN ORGANIZED HEALTH CARE EDUCATION/TRAINING PROGRAM

## 2023-09-07 PROCEDURE — 99214 OFFICE O/P EST MOD 30 MIN: CPT | Performed by: STUDENT IN AN ORGANIZED HEALTH CARE EDUCATION/TRAINING PROGRAM

## 2023-09-07 RX ORDER — AMLODIPINE BESYLATE 2.5 MG/1
2.5 TABLET ORAL DAILY
Qty: 90 TABLET | Refills: 0 | Status: SHIPPED | OUTPATIENT
Start: 2023-09-07 | End: 2023-12-04

## 2023-09-07 RX ORDER — ESTRADIOL 0.05 MG/D
PATCH, EXTENDED RELEASE TRANSDERMAL
COMMUNITY
Start: 2023-05-29

## 2023-09-07 RX ORDER — PROGESTERONE 100 MG/1
CAPSULE ORAL
COMMUNITY
Start: 2023-06-16

## 2023-09-07 RX ORDER — ROSUVASTATIN CALCIUM 40 MG/1
40 TABLET, COATED ORAL DAILY
Qty: 90 TABLET | Refills: 3 | Status: SHIPPED | OUTPATIENT
Start: 2023-09-07

## 2023-09-07 RX ORDER — PROPRANOLOL HYDROCHLORIDE 120 MG/1
120 CAPSULE, EXTENDED RELEASE ORAL DAILY
COMMUNITY
End: 2023-09-14 | Stop reason: SDUPTHER

## 2023-09-07 RX ORDER — LOSARTAN POTASSIUM 100 MG/1
100 TABLET ORAL DAILY
Qty: 90 TABLET | Refills: 3 | Status: SHIPPED | OUTPATIENT
Start: 2023-09-07

## 2023-09-07 ASSESSMENT — FIBROSIS 4 INDEX: FIB4 SCORE: 0.81

## 2023-09-07 NOTE — PROGRESS NOTES
"Subjective:     Chief Complaint   Patient presents with    Follow-Up     HPI:   Asher presents today for follow-up.    We review her labs.  Discussed her diet which is typically low carbohydrate.  Her eggs with butter and eating ratliff every other day.    Regarding her hypertension she brings me a log which is overall under good control aside from few outliers mild 130s or 140s.  Went back to taking 120 mg propanolol CR as higher dose made her feel like she was going to pass out.  Continues with losartan 100 mg daily.  Exercising.  Not watching her salt very much.    Review of Systems   Constitutional:  Negative for chills, fever, malaise/fatigue and weight loss.   Respiratory:  Negative for cough and shortness of breath.    Cardiovascular:  Positive for leg swelling (On occasion). Negative for chest pain, palpitations, orthopnea and PND.     Objective:     Exam:  BP (!) 152/82 (BP Location: Left arm, Patient Position: Sitting, BP Cuff Size: Adult)   Pulse 74   Temp 36.8 °C (98.3 °F) (Temporal)   Resp 20   Ht 1.676 m (5' 6\")   Wt 75.8 kg (167 lb)   SpO2 98%   BMI 26.95 kg/m²  Body mass index is 26.95 kg/m².    Physical Exam  Vitals reviewed.   Constitutional:       General: She is not in acute distress.     Appearance: Normal appearance. She is not ill-appearing.   HENT:      Head: Normocephalic and atraumatic.      Nose: Nose normal.      Mouth/Throat:      Mouth: Mucous membranes are moist.   Eyes:      Conjunctiva/sclera: Conjunctivae normal.   Cardiovascular:      Rate and Rhythm: Normal rate and regular rhythm.      Heart sounds: Normal heart sounds. No murmur heard.  Pulmonary:      Effort: Pulmonary effort is normal. No respiratory distress.      Breath sounds: Normal breath sounds. No stridor. No wheezing, rhonchi or rales.   Musculoskeletal:      Cervical back: Normal range of motion and neck supple. No rigidity or tenderness.      Right lower leg: No edema.      Left lower leg: No edema. "   Neurological:      General: No focal deficit present.      Mental Status: She is alert and oriented to person, place, and time.   Psychiatric:         Mood and Affect: Mood normal.         Behavior: Behavior normal.         Thought Content: Thought content normal.       Labs: Reviewed from 8/14/2023    Assessment & Plan:     52 y.o. female with the following -     1. Hypercholesterolemia with LDL greater than 190 mg/dL  2. Familial hypercholesteremia  Chronic, uncontrolled.  Discussed ideal ranges and ways to improve with lifestyle choices.  We will restart rosuvastatin as below and trend labs in 3 months.  May consider alternatives such as Repatha if not controlled.  - rosuvastatin (CRESTOR) 40 MG tablet; Take 1 Tablet by mouth every day.  Dispense: 90 Tablet; Refill: 3  - Comp Metabolic Panel; Future  - Lipid Profile; Future  - Lipoprotein (a); Future    3. Essential hypertension  This is a chronic condition, patient believes that whitecoat hypertension a factor in her elevated blood pressure today as measurements at home have been overall in good range.  She will continue with propanolol (using for HA prophylaxis) and losartan as below.  Avoiding HCTZ as prior caused urinary frequency. Provided with low-dose of amlodipine as a blood pressure out of range, advised low Na diet. Patient to have her machine crossed checked as home measurements are very different from clinic.  - losartan (COZAAR) 100 MG Tab; Take 1 Tablet by mouth every day.  Dispense: 90 Tablet; Refill: 3  - amLODIPine (NORVASC) 2.5 MG Tab; Take 1 Tablet by mouth every day. Hold if <140/90  Dispense: 90 Tablet; Refill: 0    propranolol CR (INDERAL LA) 120 MG CAPSULE SR 24 HR, Take 120 mg by mouth every day., Disp: , Rfl:     Return in about 3 months (around 12/7/2023), or if symptoms worsen or fail to improve, for labs.    Please note that this dictation was created using voice recognition software. I have made every reasonable attempt to correct  obvious errors, but I expect that there are errors of grammar and possibly content that I did not discover before finalizing the note.

## 2023-09-07 NOTE — PATIENT INSTRUCTIONS
fasting labs due in 3m     Ideal blood pressure <130/80 and at least <140/90.  If <100 for the top number we are over treating.    I will be transferring to the clinic below 10/10/2023.  [facility]  [id number] [place] [address], [place] [address]     I will be happy to continue to be your primary care provider if you'd like to follow me. If not please call [phone] to establish care with new provider

## 2023-09-10 ASSESSMENT — ENCOUNTER SYMPTOMS
SHORTNESS OF BREATH: 0
COUGH: 0
WEIGHT LOSS: 0
PALPITATIONS: 0
PND: 0
ORTHOPNEA: 0
FEVER: 0
CHILLS: 0

## 2023-09-14 ENCOUNTER — PATIENT MESSAGE (OUTPATIENT)
Dept: MEDICAL GROUP | Facility: PHYSICIAN GROUP | Age: 53
End: 2023-09-14
Payer: OTHER GOVERNMENT

## 2023-09-14 DIAGNOSIS — I10 ESSENTIAL HYPERTENSION: ICD-10-CM

## 2023-09-14 RX ORDER — PROPRANOLOL HYDROCHLORIDE 120 MG/1
120 CAPSULE, EXTENDED RELEASE ORAL DAILY
Qty: 90 CAPSULE | Refills: 3 | Status: SHIPPED | OUTPATIENT
Start: 2023-09-14

## 2023-11-06 ENCOUNTER — PATIENT MESSAGE (OUTPATIENT)
Dept: MEDICAL GROUP | Facility: LAB | Age: 53
End: 2023-11-06
Payer: OTHER GOVERNMENT

## 2023-11-06 DIAGNOSIS — G44.219 EPISODIC TENSION-TYPE HEADACHE, NOT INTRACTABLE: ICD-10-CM

## 2023-11-07 RX ORDER — AMITRIPTYLINE HYDROCHLORIDE 25 MG/1
25 TABLET, FILM COATED ORAL NIGHTLY
Qty: 90 TABLET | Refills: 3 | Status: SHIPPED | OUTPATIENT
Start: 2023-11-07 | End: 2024-02-09

## 2023-11-08 ENCOUNTER — OFFICE VISIT (OUTPATIENT)
Dept: MEDICAL GROUP | Facility: LAB | Age: 53
End: 2023-11-08
Payer: OTHER GOVERNMENT

## 2023-11-08 ENCOUNTER — HOSPITAL ENCOUNTER (OUTPATIENT)
Dept: LAB | Facility: MEDICAL CENTER | Age: 53
End: 2023-11-08
Attending: STUDENT IN AN ORGANIZED HEALTH CARE EDUCATION/TRAINING PROGRAM
Payer: OTHER GOVERNMENT

## 2023-11-08 VITALS
HEART RATE: 72 BPM | WEIGHT: 164 LBS | DIASTOLIC BLOOD PRESSURE: 84 MMHG | RESPIRATION RATE: 12 BRPM | TEMPERATURE: 98.2 F | BODY MASS INDEX: 26.36 KG/M2 | HEIGHT: 66 IN | OXYGEN SATURATION: 97 % | SYSTOLIC BLOOD PRESSURE: 146 MMHG

## 2023-11-08 DIAGNOSIS — R19.8 ALTERNATING CONSTIPATION AND DIARRHEA: ICD-10-CM

## 2023-11-08 DIAGNOSIS — R10.10 UPPER ABDOMINAL PAIN: ICD-10-CM

## 2023-11-08 DIAGNOSIS — R14.0 ABDOMINAL BLOATING: ICD-10-CM

## 2023-11-08 LAB
ALBUMIN SERPL BCP-MCNC: 4.7 G/DL (ref 3.2–4.9)
ALBUMIN/GLOB SERPL: 1.5 G/DL
ALP SERPL-CCNC: 87 U/L (ref 30–99)
ALT SERPL-CCNC: 9 U/L (ref 2–50)
ANION GAP SERPL CALC-SCNC: 13 MMOL/L (ref 7–16)
AST SERPL-CCNC: 12 U/L (ref 12–45)
BASOPHILS # BLD AUTO: 0.6 % (ref 0–1.8)
BASOPHILS # BLD: 0.04 K/UL (ref 0–0.12)
BILIRUB SERPL-MCNC: 0.4 MG/DL (ref 0.1–1.5)
BUN SERPL-MCNC: 15 MG/DL (ref 8–22)
CALCIUM ALBUM COR SERPL-MCNC: 9.2 MG/DL (ref 8.5–10.5)
CALCIUM SERPL-MCNC: 9.8 MG/DL (ref 8.5–10.5)
CHLORIDE SERPL-SCNC: 104 MMOL/L (ref 96–112)
CO2 SERPL-SCNC: 23 MMOL/L (ref 20–33)
CREAT SERPL-MCNC: 0.8 MG/DL (ref 0.5–1.4)
CRP SERPL HS-MCNC: 0.55 MG/DL (ref 0–0.75)
EOSINOPHIL # BLD AUTO: 0.13 K/UL (ref 0–0.51)
EOSINOPHIL NFR BLD: 2 % (ref 0–6.9)
ERYTHROCYTE [DISTWIDTH] IN BLOOD BY AUTOMATED COUNT: 41.3 FL (ref 35.9–50)
ERYTHROCYTE [SEDIMENTATION RATE] IN BLOOD BY WESTERGREN METHOD: 15 MM/HOUR (ref 0–25)
GFR SERPLBLD CREATININE-BSD FMLA CKD-EPI: 87 ML/MIN/1.73 M 2
GLOBULIN SER CALC-MCNC: 3.1 G/DL (ref 1.9–3.5)
GLUCOSE SERPL-MCNC: 84 MG/DL (ref 65–99)
HCT VFR BLD AUTO: 39.8 % (ref 37–47)
HGB BLD-MCNC: 13.2 G/DL (ref 12–16)
IMM GRANULOCYTES # BLD AUTO: 0.01 K/UL (ref 0–0.11)
IMM GRANULOCYTES NFR BLD AUTO: 0.2 % (ref 0–0.9)
LIPASE SERPL-CCNC: 31 U/L (ref 11–82)
LYMPHOCYTES # BLD AUTO: 2.64 K/UL (ref 1–4.8)
LYMPHOCYTES NFR BLD: 41.1 % (ref 22–41)
MCH RBC QN AUTO: 30 PG (ref 27–33)
MCHC RBC AUTO-ENTMCNC: 33.2 G/DL (ref 32.2–35.5)
MCV RBC AUTO: 90.5 FL (ref 81.4–97.8)
MONOCYTES # BLD AUTO: 0.42 K/UL (ref 0–0.85)
MONOCYTES NFR BLD AUTO: 6.5 % (ref 0–13.4)
NEUTROPHILS # BLD AUTO: 3.19 K/UL (ref 1.82–7.42)
NEUTROPHILS NFR BLD: 49.6 % (ref 44–72)
NRBC # BLD AUTO: 0 K/UL
NRBC BLD-RTO: 0 /100 WBC (ref 0–0.2)
PLATELET # BLD AUTO: 282 K/UL (ref 164–446)
PMV BLD AUTO: 11.6 FL (ref 9–12.9)
POTASSIUM SERPL-SCNC: 4.1 MMOL/L (ref 3.6–5.5)
PROT SERPL-MCNC: 7.8 G/DL (ref 6–8.2)
RBC # BLD AUTO: 4.4 M/UL (ref 4.2–5.4)
SODIUM SERPL-SCNC: 140 MMOL/L (ref 135–145)
TSH SERPL DL<=0.005 MIU/L-ACNC: 2.54 UIU/ML (ref 0.38–5.33)
WBC # BLD AUTO: 6.4 K/UL (ref 4.8–10.8)

## 2023-11-08 PROCEDURE — 86140 C-REACTIVE PROTEIN: CPT

## 2023-11-08 PROCEDURE — 3079F DIAST BP 80-89 MM HG: CPT | Performed by: STUDENT IN AN ORGANIZED HEALTH CARE EDUCATION/TRAINING PROGRAM

## 2023-11-08 PROCEDURE — 80053 COMPREHEN METABOLIC PANEL: CPT

## 2023-11-08 PROCEDURE — 36415 COLL VENOUS BLD VENIPUNCTURE: CPT

## 2023-11-08 PROCEDURE — 84443 ASSAY THYROID STIM HORMONE: CPT

## 2023-11-08 PROCEDURE — 3077F SYST BP >= 140 MM HG: CPT | Performed by: STUDENT IN AN ORGANIZED HEALTH CARE EDUCATION/TRAINING PROGRAM

## 2023-11-08 PROCEDURE — 86364 TISS TRNSGLTMNASE EA IG CLAS: CPT

## 2023-11-08 PROCEDURE — 83013 H PYLORI (C-13) BREATH: CPT

## 2023-11-08 PROCEDURE — 99214 OFFICE O/P EST MOD 30 MIN: CPT | Performed by: STUDENT IN AN ORGANIZED HEALTH CARE EDUCATION/TRAINING PROGRAM

## 2023-11-08 PROCEDURE — 82784 ASSAY IGA/IGD/IGG/IGM EACH: CPT

## 2023-11-08 PROCEDURE — 85652 RBC SED RATE AUTOMATED: CPT

## 2023-11-08 PROCEDURE — 86258 DGP ANTIBODY EACH IG CLASS: CPT

## 2023-11-08 PROCEDURE — 85025 COMPLETE CBC W/AUTO DIFF WBC: CPT

## 2023-11-08 PROCEDURE — 83690 ASSAY OF LIPASE: CPT

## 2023-11-08 PROCEDURE — 86231 EMA EACH IG CLASS: CPT

## 2023-11-08 ASSESSMENT — ENCOUNTER SYMPTOMS
DIARRHEA: 1
ABDOMINAL PAIN: 1

## 2023-11-08 ASSESSMENT — FIBROSIS 4 INDEX: FIB4 SCORE: 0.83

## 2023-11-08 NOTE — PROGRESS NOTES
"Subjective:     Chief Complaint   Patient presents with    GI Problem     Diarrhea x 1 week, GI pain, bloating     HPI:   Asher presents today with GI complaints.    Patient with history of chronic alternating constipation and diarrhea as well as bloating.  Reports upper abdominal pain that is worse at night.  Feels typically better in the mornings.  States that she has some postprandial bloating that seems also worse at night.  She reports intermittent fasting so wonders if the nighttime upper abdominal pain may be due to hunger.  States that Tums does help resolve the pain.  Not taking any other acid blockers.    Recently on Halloween she tried a new food (flour free tortilla) and shortly after had the abdominal pain accompanied with bloating and diarrhea which was significant going 5 times in a 3-hour period, watery without blood.  This was accompanied with nausea but no vomiting.  Had some bloating that has persisted after. Spouse had small amount of the tortilla, but he didn't get sick. Tried a small amount of the tortilla and had diarrhea again.     She also recently trialed to go 24 hours without food which did not seem to help.     Recently went approximately 4 days without a bowel movement, took smooth move tea with senna and had some diarrhea following.  Has not had a bowel movement in the last 2 days.    Has not been able to get in with gastroenterology since her referral was placed in April.    Review of Systems   Constitutional:  Positive for malaise/fatigue. Negative for chills, fever and weight loss.   Respiratory:  Negative for cough and shortness of breath.    Cardiovascular:  Negative for chest pain and palpitations.   Gastrointestinal:  Positive for abdominal pain, constipation, diarrhea and nausea. Negative for heartburn and vomiting.     Objective:     Exam:  BP (!) 146/84   Pulse 72   Temp 36.8 °C (98.2 °F) (Temporal)   Resp 12   Ht 1.676 m (5' 6\")   Wt 74.4 kg (164 lb)   SpO2 97%   " BMI 26.47 kg/m²  Body mass index is 26.47 kg/m².    Physical Exam  Vitals reviewed.   Constitutional:       General: She is not in acute distress.     Appearance: Normal appearance. She is not ill-appearing.   HENT:      Head: Normocephalic and atraumatic.      Mouth/Throat:      Mouth: Mucous membranes are moist.   Eyes:      General: No scleral icterus.  Cardiovascular:      Rate and Rhythm: Normal rate and regular rhythm.      Heart sounds: Normal heart sounds.   Pulmonary:      Effort: Pulmonary effort is normal.      Breath sounds: Normal breath sounds.   Abdominal:      General: Abdomen is flat. Bowel sounds are normal. There is no distension.      Palpations: Abdomen is soft. There is no mass (?  Stool burden upper abdomen without discrete mass).      Tenderness: There is abdominal tenderness (Minimal upper abdominal tenderness, more localized left upper quadrant). There is no guarding or rebound. Negative signs include Hinson's sign.   Skin:     General: Skin is warm and dry.      Coloration: Skin is not jaundiced.   Neurological:      General: No focal deficit present.      Mental Status: She is alert and oriented to person, place, and time.   Psychiatric:         Mood and Affect: Mood normal.         Behavior: Behavior normal.         Thought Content: Thought content normal.       Labs: Reviewed from 8/14/2023  Imaging: Reviewed from 4/25/2023    Assessment & Plan:     53 y.o. female with the following -     1. Upper abdominal pain  - Comp Metabolic Panel; Future  - LIPASE; Future  - Referral to Gastroenterology  - HELICOBACTER PYLORI BREATH TEST; Future  - US-ABDOMEN COMPLETE SURVEY; Future  2. Abdominal bloating  - Referral to Gastroenterology  - HELICOBACTER PYLORI BREATH TEST; Future  - US-ABDOMEN COMPLETE SURVEY; Future  3. Alternating constipation and diarrhea  - Comp Metabolic Panel; Future  - TSH WITH REFLEX TO FT4; Future  - CBC WITH DIFFERENTIAL; Future  - CELIAC DISEASE AB PANEL; Future  -  CALPROTECTIN,FECAL; Future  - CRP QUANTITIVE (NON-CARDIAC); Future  - Sed Rate; Future  - Referral to Gastroenterology  Chronic conditions, worse recently with diet changes. Given upper abdominal pain and bloating we will obtain testing for H. pylori.  Labs/imaging as above, urgent referral placed to gastroenterology since its been so long and she has not been able to get an appointment.  If pain is worsening, would consider additional imaging if necessary.  Sent order to [facility] to rule out SIBO.  Patient to consider low FODMAP diet and start a dietary log to avoid triggers.    Return in about 2 weeks (around 11/22/2023), or if symptoms worsen or fail to improve.    Please note that this dictation was created using voice recognition software. I have made every reasonable attempt to correct obvious errors, but I expect that there are errors of grammar and possibly content that I did not discover before finalizing the note.

## 2023-11-09 ASSESSMENT — ENCOUNTER SYMPTOMS
FEVER: 0
SHORTNESS OF BREATH: 0
CHILLS: 0
COUGH: 0
VOMITING: 0
HEARTBURN: 0
CONSTIPATION: 1
WEIGHT LOSS: 0
NAUSEA: 1
PALPITATIONS: 0

## 2023-11-10 LAB
IGA SERPL-MCNC: 85 MG/DL (ref 68–408)
UREA BREATH TEST QL: NEGATIVE

## 2023-11-11 LAB
ENDOMYSIUM IGA TITR SER IF: NORMAL {TITER}
TTG IGA SER IA-ACNC: 6 U/ML (ref 0–3)

## 2023-11-12 LAB — GLIADIN IGA SER IA-ACNC: 3 UNITS (ref 0–19)

## 2023-11-13 ENCOUNTER — HOSPITAL ENCOUNTER (OUTPATIENT)
Facility: MEDICAL CENTER | Age: 53
End: 2023-11-13
Attending: STUDENT IN AN ORGANIZED HEALTH CARE EDUCATION/TRAINING PROGRAM
Payer: OTHER GOVERNMENT

## 2023-11-13 DIAGNOSIS — R19.8 ALTERNATING CONSTIPATION AND DIARRHEA: ICD-10-CM

## 2023-11-13 PROCEDURE — 83993 ASSAY FOR CALPROTECTIN FECAL: CPT

## 2023-11-14 ENCOUNTER — TELEPHONE (OUTPATIENT)
Dept: MEDICAL GROUP | Facility: LAB | Age: 53
End: 2023-11-14
Payer: OTHER GOVERNMENT

## 2023-11-14 DIAGNOSIS — N95.1 MENOPAUSAL VAGINAL DRYNESS: ICD-10-CM

## 2023-11-14 DIAGNOSIS — N94.10 DYSPAREUNIA IN FEMALE: ICD-10-CM

## 2023-11-14 DIAGNOSIS — Z79.890 HORMONE REPLACEMENT THERAPY: ICD-10-CM

## 2023-11-15 ENCOUNTER — HOSPITAL ENCOUNTER (OUTPATIENT)
Dept: LAB | Facility: MEDICAL CENTER | Age: 53
End: 2023-11-15
Payer: OTHER GOVERNMENT

## 2023-11-15 ENCOUNTER — HOSPITAL ENCOUNTER (OUTPATIENT)
Facility: MEDICAL CENTER | Age: 53
End: 2023-11-15
Payer: OTHER GOVERNMENT

## 2023-11-15 LAB
CALPROTECTIN STL-MCNT: 64 UG/G
PATHOLOGY CONSULT NOTE: NORMAL

## 2023-11-15 PROCEDURE — 87624 HPV HI-RISK TYP POOLED RSLT: CPT

## 2023-11-15 PROCEDURE — 88342 IMHCHEM/IMCYTCHM 1ST ANTB: CPT

## 2023-11-15 PROCEDURE — 88175 CYTOPATH C/V AUTO FLUID REDO: CPT

## 2023-11-15 PROCEDURE — 88305 TISSUE EXAM BY PATHOLOGIST: CPT

## 2023-11-15 NOTE — TELEPHONE ENCOUNTER
VOICEMAIL  1. Caller Name: Asher                        Call Back Number: [phone]    2. Message: Pt is requesting a referral renewal to [facility].    3. Patient approves office to leave a detailed voicemail/MyChart message: yes

## 2023-11-17 ENCOUNTER — HOSPITAL ENCOUNTER (OUTPATIENT)
Dept: RADIOLOGY | Facility: MEDICAL CENTER | Age: 53
End: 2023-11-17
Attending: STUDENT IN AN ORGANIZED HEALTH CARE EDUCATION/TRAINING PROGRAM
Payer: OTHER GOVERNMENT

## 2023-11-17 DIAGNOSIS — R14.0 ABDOMINAL BLOATING: ICD-10-CM

## 2023-11-17 DIAGNOSIS — R10.10 UPPER ABDOMINAL PAIN: ICD-10-CM

## 2023-11-17 PROCEDURE — 76700 US EXAM ABDOM COMPLETE: CPT

## 2023-11-22 ENCOUNTER — OFFICE VISIT (OUTPATIENT)
Dept: MEDICAL GROUP | Facility: LAB | Age: 53
End: 2023-11-22
Payer: OTHER GOVERNMENT

## 2023-11-22 VITALS
HEIGHT: 66 IN | OXYGEN SATURATION: 98 % | SYSTOLIC BLOOD PRESSURE: 132 MMHG | WEIGHT: 162 LBS | RESPIRATION RATE: 16 BRPM | DIASTOLIC BLOOD PRESSURE: 86 MMHG | HEART RATE: 72 BPM | BODY MASS INDEX: 26.03 KG/M2 | TEMPERATURE: 98.2 F

## 2023-11-22 DIAGNOSIS — R19.5 ELEVATED FECAL CALPROTECTIN: ICD-10-CM

## 2023-11-22 DIAGNOSIS — R93.5 ABNORMAL ABDOMINAL ULTRASOUND: ICD-10-CM

## 2023-11-22 DIAGNOSIS — R19.8 ALTERNATING CONSTIPATION AND DIARRHEA: ICD-10-CM

## 2023-11-22 LAB
CYTOLOGIST CVX/VAG CYTO: NORMAL
CYTOLOGY CVX/VAG DOC CYTO: NORMAL
CYTOLOGY CVX/VAG DOC THIN PREP: NORMAL
HPV I/H RISK 4 DNA CVX QL PROBE+SIG AMP: NEGATIVE
NOTE NL11727A: NORMAL
OTHER STN SPEC: NORMAL
STAT OF ADQ CVX/VAG CYTO-IMP: NORMAL

## 2023-11-22 PROCEDURE — 3075F SYST BP GE 130 - 139MM HG: CPT | Performed by: STUDENT IN AN ORGANIZED HEALTH CARE EDUCATION/TRAINING PROGRAM

## 2023-11-22 PROCEDURE — 99214 OFFICE O/P EST MOD 30 MIN: CPT | Performed by: STUDENT IN AN ORGANIZED HEALTH CARE EDUCATION/TRAINING PROGRAM

## 2023-11-22 PROCEDURE — 3079F DIAST BP 80-89 MM HG: CPT | Performed by: STUDENT IN AN ORGANIZED HEALTH CARE EDUCATION/TRAINING PROGRAM

## 2023-11-22 ASSESSMENT — FIBROSIS 4 INDEX: FIB4 SCORE: 0.78

## 2023-11-22 NOTE — PROGRESS NOTES
"Subjective:     Chief Complaint   Patient presents with    Constipation     X7 days    Follow-Up     Saw GI Doctor 2 days     HPI:   Asher presents today for follow-up.    Patient saw gastroenterology 2 days ago, encounter reviewed.  Planning for endoscopy and colonoscopy.  She has a CT ordered prior and an MRI of the abdomen to evaluate lesion on the liver.  AFP also ordered.    Patient reports that she has not had a normal bowel movement for the past week.  Did have a small bowel movement yesterday after having Senna tea.  States that at times if she drinks too she will have some diarrhea after.  Has not titrated MiraLAX.    She is wondering if intermittent fasting is not the best idea for her given her chronic GI issues.  Also has looked at the low FODMAP diet and made some changes with avoiding her popcorn and oatmeal and bloating significantly improved.  Has not had any weight loss or blood in the stool.  No nausea or vomiting.  Has not completed SIBO test but has it at home.    Quite happy about her blood pressure today, denies any chest pain/shortness of breath.    Health Maintenance: Declined flu vaccine.    Objective:     Exam:  /86 (BP Location: Left arm, Patient Position: Sitting, BP Cuff Size: Adult)   Pulse 72   Temp 36.8 °C (98.2 °F) (Temporal)   Resp 16   Ht 1.676 m (5' 6\")   Wt 73.5 kg (162 lb)   SpO2 98%   BMI 26.15 kg/m²  Body mass index is 26.15 kg/m².    Physical Exam  Vitals reviewed.   Constitutional:       General: She is not in acute distress.     Appearance: Normal appearance. She is not ill-appearing.   HENT:      Head: Normocephalic and atraumatic.      Mouth/Throat:      Mouth: Mucous membranes are moist.   Eyes:      General: No scleral icterus.  Cardiovascular:      Rate and Rhythm: Normal rate and regular rhythm.      Heart sounds: Normal heart sounds.   Pulmonary:      Effort: Pulmonary effort is normal.      Breath sounds: Normal breath sounds.   Abdominal:      " General: Abdomen is flat. Bowel sounds are normal. There is no distension.      Palpations: Abdomen is soft. There is no mass.      Tenderness: There is no abdominal tenderness. There is no guarding or rebound.   Skin:     General: Skin is warm and dry.      Coloration: Skin is not jaundiced.   Neurological:      General: No focal deficit present.      Mental Status: She is alert and oriented to person, place, and time.   Psychiatric:         Mood and Affect: Mood normal.         Behavior: Behavior normal.         Thought Content: Thought content normal.       Labs: Reviewed from 11/8/2023, 11/15/2023  Imaging: Reviewed from 11/17/2023  IMPRESSION:  1.  There is an oval anterior echogenic hepatic mass. This could be focal fat over other solid mass cannot be excluded. This could be further assessed with hepatic MRI.  2.  There are bilateral simple subcentimeter renal cortical cysts. No follow-up imaging is indicated per ACR guidelines.  3.  Stable echogenic right lateral 7 mm cortical mass with an adjacent smaller mass not measured previously. These may represent incidental angiomyolipomas or areas of fat due to parenchymal scarring from prior inflammation.    Assessment & Plan:     53 y.o. female with the following -     1. Alternating constipation and diarrhea  2. Elevated fecal calprotectin  3. Abnormal abdominal ultrasound  Patient with chronic GI issues suspected IBS-C currently under evaluation for alternative etiology with CT, MRI, endoscopy and colonoscopy planned.  Has noted some benefit with changes in her diet and we agree that intermittent fasting may not be the best option for her due to her ongoing issues with constipation.  Discussed ways to improve with adequate fiber, water and exercise.  If not controlled can consider titration of MiraLAX which was discussed and if still resistant may consider alternative such as Linzess.  We reviewed that amitriptyline may also be contributing to her chronic  constipation although patient reports she has been on this medication since her 30s for headache prophylaxis.  She will attempt to decrease this from 25 mg nightly to 12.5 mg nightly and we will attempt to taper her off this medication to see if it would improve her constipation.  Continue care with gastroenterology and advised completion of all studies.    Return in about 3 months (around 2/22/2024), or if symptoms worsen or fail to improve.    Please note that this dictation was created using voice recognition software. I have made every reasonable attempt to correct obvious errors, but I expect that there are errors of grammar and possibly content that I did not discover before finalizing the note.

## 2023-12-04 DIAGNOSIS — I10 ESSENTIAL HYPERTENSION: ICD-10-CM

## 2023-12-04 RX ORDER — AMLODIPINE BESYLATE 2.5 MG/1
TABLET ORAL
Qty: 90 TABLET | Refills: 0 | Status: SHIPPED | OUTPATIENT
Start: 2023-12-04

## 2023-12-12 ENCOUNTER — HOSPITAL ENCOUNTER (OUTPATIENT)
Dept: LAB | Facility: MEDICAL CENTER | Age: 53
End: 2023-12-12
Attending: INTERNAL MEDICINE
Payer: OTHER GOVERNMENT

## 2023-12-12 ENCOUNTER — HOSPITAL ENCOUNTER (OUTPATIENT)
Dept: LAB | Facility: MEDICAL CENTER | Age: 53
End: 2023-12-12
Attending: STUDENT IN AN ORGANIZED HEALTH CARE EDUCATION/TRAINING PROGRAM
Payer: OTHER GOVERNMENT

## 2023-12-12 DIAGNOSIS — E78.00 HYPERCHOLESTEROLEMIA WITH LDL GREATER THAN 190 MG/DL: ICD-10-CM

## 2023-12-12 DIAGNOSIS — E78.01 FAMILIAL HYPERCHOLESTEREMIA: ICD-10-CM

## 2023-12-12 LAB
ALBUMIN SERPL BCP-MCNC: 4.4 G/DL (ref 3.2–4.9)
ALBUMIN/GLOB SERPL: 1.7 G/DL
ALP SERPL-CCNC: 85 U/L (ref 30–99)
ALT SERPL-CCNC: 13 U/L (ref 2–50)
ANION GAP SERPL CALC-SCNC: 9 MMOL/L (ref 7–16)
AST SERPL-CCNC: 17 U/L (ref 12–45)
BILIRUB SERPL-MCNC: 0.4 MG/DL (ref 0.1–1.5)
BUN SERPL-MCNC: 8 MG/DL (ref 8–22)
CALCIUM ALBUM COR SERPL-MCNC: 9.2 MG/DL (ref 8.5–10.5)
CALCIUM SERPL-MCNC: 9.5 MG/DL (ref 8.5–10.5)
CHLORIDE SERPL-SCNC: 106 MMOL/L (ref 96–112)
CHOLEST SERPL-MCNC: 224 MG/DL (ref 100–199)
CO2 SERPL-SCNC: 24 MMOL/L (ref 20–33)
CREAT SERPL-MCNC: 0.77 MG/DL (ref 0.5–1.4)
FASTING STATUS PATIENT QL REPORTED: NORMAL
GFR SERPLBLD CREATININE-BSD FMLA CKD-EPI: 91 ML/MIN/1.73 M 2
GLOBULIN SER CALC-MCNC: 2.6 G/DL (ref 1.9–3.5)
GLUCOSE SERPL-MCNC: 87 MG/DL (ref 65–99)
HDLC SERPL-MCNC: 52 MG/DL
LDLC SERPL CALC-MCNC: 154 MG/DL
POTASSIUM SERPL-SCNC: 4.4 MMOL/L (ref 3.6–5.5)
PROT SERPL-MCNC: 7 G/DL (ref 6–8.2)
SODIUM SERPL-SCNC: 139 MMOL/L (ref 135–145)
TRIGL SERPL-MCNC: 88 MG/DL (ref 0–149)

## 2023-12-12 PROCEDURE — 82105 ALPHA-FETOPROTEIN SERUM: CPT

## 2023-12-12 PROCEDURE — 80053 COMPREHEN METABOLIC PANEL: CPT

## 2023-12-12 PROCEDURE — 83695 ASSAY OF LIPOPROTEIN(A): CPT

## 2023-12-12 PROCEDURE — 80061 LIPID PANEL: CPT

## 2023-12-12 PROCEDURE — 36415 COLL VENOUS BLD VENIPUNCTURE: CPT

## 2023-12-13 ENCOUNTER — OFFICE VISIT (OUTPATIENT)
Dept: VASCULAR LAB | Facility: MEDICAL CENTER | Age: 53
End: 2023-12-13
Attending: INTERNAL MEDICINE
Payer: OTHER GOVERNMENT

## 2023-12-13 VITALS
WEIGHT: 160 LBS | DIASTOLIC BLOOD PRESSURE: 85 MMHG | HEIGHT: 66 IN | BODY MASS INDEX: 25.71 KG/M2 | HEART RATE: 66 BPM | SYSTOLIC BLOOD PRESSURE: 131 MMHG

## 2023-12-13 DIAGNOSIS — E78.00 HYPERCHOLESTEROLEMIA WITH LDL GREATER THAN 190 MG/DL: ICD-10-CM

## 2023-12-13 PROCEDURE — 3079F DIAST BP 80-89 MM HG: CPT | Performed by: INTERNAL MEDICINE

## 2023-12-13 PROCEDURE — 99213 OFFICE O/P EST LOW 20 MIN: CPT | Performed by: INTERNAL MEDICINE

## 2023-12-13 PROCEDURE — 99212 OFFICE O/P EST SF 10 MIN: CPT

## 2023-12-13 PROCEDURE — 3075F SYST BP GE 130 - 139MM HG: CPT | Performed by: INTERNAL MEDICINE

## 2023-12-13 ASSESSMENT — FIBROSIS 4 INDEX: FIB4 SCORE: 0.92

## 2023-12-14 LAB
AFP-TM SERPL-MCNC: 4 NG/ML (ref 0–9)
LPA SERPL-MCNC: 27 MG/DL

## 2023-12-14 NOTE — PROGRESS NOTES
[facility] - Follow up Visit  Date of Service: 12/13/23    Here for vascular follow up - cholesterol and BP    Subjective      HPI  History of ASCVD: No  Other Established (non-atherosclerotic) Vascular Disease, if Present: None  Age at Initial Diagnosis of Dyslipidemia: early 30s    Current Prescription Lipid Lowering Medications - including dose:   Statin: Rosuvastatin 40 mg daily  Non-Statin: None  Current Lipid Lowering and Related Supplements:   none  Any Current Side Effects Potentially Related to Lipid Lowering therapy?   No  Current Adherence to Lipid Lowering Therapies   Completely non-adherent  Previously Attempted Interventions for Lipids - including outcome  Statin: None    Outcome: N/A  Non-Statin: ezetimibe   Outcome:  tolerated well  Any Previous History of Statin Intolerance?   No  Baseline Lipids Prior to Treatment:   Shown here:  LDL-C: 298  HDL-C: 58  Trigs: 140 Date: June 2020  Other Pertinent History:   She stopped her statin for about a year  She is now back on it consistently  She has no myalgias  She started on HRT and feels better  She is lost some weight and is eating better  History of other CV risk factors:   -HTN -she has not been checking her blood pressure at home.  It was elevated in the office.  Her PCP prescribed a low-dose of amlodipine but she is only taking it as needed.  She remains on losartan and propranolol LA on a regular basis  FAMILY HISTORY:   Mom - cholesterol over 300  2 brothers - both with MI in 40s    SOCIAL HISTORY   Social History     Tobacco Use   Smoking Status Former    Types: Cigarettes   Smokeless Tobacco Never   Tobacco Comments    former light smoker; quit when brother had bypass     Change in weight: Down 8 or 9 pounds  Exercise habits: moderate regular exercise program  Diet: relatively heart healthy        Objective      Vitals:    12/13/23 1617 12/13/23 1621   BP: (!) 160/93 131/85   BP Location: Left arm Left arm   Patient Position: Sitting  "Sitting   BP Cuff Size: Adult Adult   Pulse: 65 66   Weight: 72.6 kg (160 lb)    Height: 1.676 m (5' 6\")          Physical Exam  Vitals reviewed.   Constitutional:       General: She is not in acute distress.     Appearance: She is not diaphoretic.   HENT:      Head: Normocephalic and atraumatic.   Eyes:      General: No scleral icterus.     Conjunctiva/sclera: Conjunctivae normal.   Neck:      Comments: No carotid bruits  Cardiovascular:      Rate and Rhythm: Normal rate and regular rhythm.      Heart sounds: Normal heart sounds. No murmur heard.  Pulmonary:      Effort: Pulmonary effort is normal. No respiratory distress.      Breath sounds: Normal breath sounds. No wheezing or rales.   Abdominal:      General: There is no distension.      Palpations: Abdomen is soft.   Musculoskeletal:         General: No tenderness.   Skin:     Coloration: Skin is not pale.   Neurological:      Mental Status: She is alert and oriented to person, place, and time.      Cranial Nerves: No cranial nerve deficit.      Coordination: Coordination normal.   Psychiatric:         Behavior: Behavior normal.       DATA REVIEW:  Most Recent Lipid Panel:   Lab Results   Component Value Date    CHOLSTRLTOT 224 (H) 12/12/2023    TRIGLYCERIDE 88 12/12/2023    HDL 52 12/12/2023     (H) 12/12/2023     Other Pertinent Blood Work:   Lab Results   Component Value Date    SODIUM 139 12/12/2023    POTASSIUM 4.4 12/12/2023    CHLORIDE 106 12/12/2023    CO2 24 12/12/2023    ANION 9.0 12/12/2023    GLUCOSE 87 12/12/2023    BUN 8 12/12/2023    CREATININE 0.77 12/12/2023    CALCIUM 9.5 12/12/2023    ASTSGOT 17 12/12/2023    ALTSGPT 13 12/12/2023    ALKPHOSPHAT 85 12/12/2023    TBILIRUBIN 0.4 12/12/2023    ALBUMIN 4.4 12/12/2023    AGRATIO 1.7 12/12/2023    CREACTPROT 0.55 11/08/2023    LIPOPROTA 23 08/14/2023    TSHULTRASEN 2.540 11/08/2023         ASSESSMENT AND PLAN  Patient Type, check all that apply:   Primary Prevention  Established " Atherosclerotic Cardiovascular Disease (ASCVD)  No  Other Established (non-atherosclerotic) Vascular Disease, if Present:  None  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):   Yes  Based on high LDL-c at young age and fh of premature cad in multiple family members - pathogenesis, risk and cascade screening discussed in depth again  ACC/AHA Indication for Statin Therapy, missael all that apply:  LDL-C at baseline >190 mg/dl: Indication for High intensity statin   Calculated Risk for ASCVD, if applicable    N/A  Other Significant Risk Markers, if any, missael all that apply   Family history of premature ASCVD in first degree relative  Normal lipoprotein a  Favorable HDL, triglycerides, and presumably LDL particle size  Goal LDL-C and nonHDL-C based on Clinic Protocol  LDL-C:   <100 mg/dL -she remains above target despite maximum dose rosuvastatin  Lifestyle Recommendations From Today’s Visit:    Exercise: continue decent exercise habits  Eating Plan: Concentrate on  Low sat/Trans fat  Statin Therapy Recommendations from Today’s Visit:   - continue rosuvastatin 40 mg daily  Non-Statin Medications Recommendations from Today’s Visit:   -Strongly consider addition of ezetimibe next visit if her vascular screen looks relatively reasonable  Indication for PCSK9 Inhibitor, if applicable:  FH with suboptimal control of LDL-C despite maximally tolerated statin   -Consider starting especially if her vascular screen shows subclinical athero-  Supplements Recommended at this visit:   None currently  Recommendations for Other Cardiovascular Risk Factors, missael all that apply:   - HTN - unclear level of control.  Elevated in the office today.  Unclear level of control at home.  Feels inderal helps mood and HAs so will continue. Continue losartan. Check lytes, gfr, urine for ma. Start home bp monitoring. Consider addition of dhp ccb if above 130/80 at home  Other Issues:  - HRT -recently started.  Maybe okay for a few years  of her vascular screen does not show significant atherosclerosis.  Will discuss further at next visit    Studies Ordered at Todays Visit: CIMT and vascular screen  Blood Work Ordered At Today’s visit: None  Follow-Up: 8 weeks      Michael J Bloch, M.D.

## 2023-12-26 ENCOUNTER — APPOINTMENT (OUTPATIENT)
Dept: RADIOLOGY | Facility: MEDICAL CENTER | Age: 53
End: 2023-12-26
Attending: INTERNAL MEDICINE
Payer: OTHER GOVERNMENT

## 2024-01-17 ENCOUNTER — APPOINTMENT (OUTPATIENT)
Dept: RADIOLOGY | Facility: MEDICAL CENTER | Age: 54
End: 2024-01-17
Payer: OTHER GOVERNMENT

## 2024-01-17 DIAGNOSIS — N95.0 POSTMENOPAUSAL BLEEDING: ICD-10-CM

## 2024-01-17 DIAGNOSIS — R10.2 PELVIC PAIN SYNDROME: ICD-10-CM

## 2024-01-17 PROCEDURE — 76830 TRANSVAGINAL US NON-OB: CPT

## 2024-02-09 ENCOUNTER — OFFICE VISIT (OUTPATIENT)
Dept: MEDICAL GROUP | Facility: LAB | Age: 54
End: 2024-02-09
Payer: OTHER GOVERNMENT

## 2024-02-09 ENCOUNTER — HOSPITAL ENCOUNTER (OUTPATIENT)
Facility: MEDICAL CENTER | Age: 54
End: 2024-02-09
Attending: STUDENT IN AN ORGANIZED HEALTH CARE EDUCATION/TRAINING PROGRAM
Payer: OTHER GOVERNMENT

## 2024-02-09 VITALS
TEMPERATURE: 97.6 F | WEIGHT: 158.29 LBS | RESPIRATION RATE: 20 BRPM | HEIGHT: 66 IN | BODY MASS INDEX: 25.44 KG/M2 | OXYGEN SATURATION: 97 % | SYSTOLIC BLOOD PRESSURE: 138 MMHG | DIASTOLIC BLOOD PRESSURE: 88 MMHG | HEART RATE: 74 BPM

## 2024-02-09 DIAGNOSIS — G44.219 EPISODIC TENSION-TYPE HEADACHE, NOT INTRACTABLE: ICD-10-CM

## 2024-02-09 DIAGNOSIS — G43.001 MIGRAINE WITHOUT AURA AND WITH STATUS MIGRAINOSUS, NOT INTRACTABLE: ICD-10-CM

## 2024-02-09 DIAGNOSIS — Z79.899 HIGH RISK MEDICATION USE: ICD-10-CM

## 2024-02-09 LAB — AMBIGUOUS DTTM AMBI4: NORMAL

## 2024-02-09 PROCEDURE — 3075F SYST BP GE 130 - 139MM HG: CPT | Performed by: STUDENT IN AN ORGANIZED HEALTH CARE EDUCATION/TRAINING PROGRAM

## 2024-02-09 PROCEDURE — 99214 OFFICE O/P EST MOD 30 MIN: CPT | Performed by: STUDENT IN AN ORGANIZED HEALTH CARE EDUCATION/TRAINING PROGRAM

## 2024-02-09 PROCEDURE — 3079F DIAST BP 80-89 MM HG: CPT | Performed by: STUDENT IN AN ORGANIZED HEALTH CARE EDUCATION/TRAINING PROGRAM

## 2024-02-09 PROCEDURE — 80307 DRUG TEST PRSMV CHEM ANLYZR: CPT

## 2024-02-09 PROCEDURE — G0480 DRUG TEST DEF 1-7 CLASSES: HCPCS

## 2024-02-09 RX ORDER — AMITRIPTYLINE HYDROCHLORIDE 50 MG/1
50 TABLET, FILM COATED ORAL NIGHTLY
Qty: 90 TABLET | Refills: 3 | Status: SHIPPED | OUTPATIENT
Start: 2024-02-09

## 2024-02-09 RX ORDER — PROMETHAZINE HYDROCHLORIDE 25 MG/1
25 TABLET ORAL EVERY 6 HOURS PRN
Qty: 30 TABLET | Refills: 3 | Status: SHIPPED | OUTPATIENT
Start: 2024-02-09

## 2024-02-09 RX ORDER — BUTALBITAL, ACETAMINOPHEN AND CAFFEINE 50; 325; 40 MG/1; MG/1; MG/1
1 TABLET ORAL EVERY 6 HOURS PRN
Qty: 30 TABLET | Refills: 0 | Status: SHIPPED | OUTPATIENT
Start: 2024-02-09 | End: 2024-03-10

## 2024-02-09 RX ORDER — PROMETHAZINE HYDROCHLORIDE 25 MG/1
25 TABLET ORAL EVERY 6 HOURS PRN
Qty: 30 TABLET | Refills: 0 | Status: SHIPPED | OUTPATIENT
Start: 2024-02-09 | End: 2024-02-09

## 2024-02-09 ASSESSMENT — ENCOUNTER SYMPTOMS
WEIGHT LOSS: 0
HEADACHES: 1
PHOTOPHOBIA: 1
FEVER: 0
VOMITING: 0
COUGH: 0
DIARRHEA: 0
DIZZINESS: 1
NAUSEA: 1
NECK PAIN: 1
SHORTNESS OF BREATH: 0
CHILLS: 0
ABDOMINAL PAIN: 0

## 2024-02-09 ASSESSMENT — FIBROSIS 4 INDEX: FIB4 SCORE: 0.92

## 2024-02-09 NOTE — PATIENT INSTRUCTIONS
Consider stopping estrogen (if you get aura)    Remember to keep a headache log and avoid triggers!     Prevention:  - continue elavil 50mg nightly  - continue supplementing:  - magnesium: 400-600 mg once or 200-300 mg twice daily  - riboflavin (vitamin B2): 400 mg once daily  - coenzyme Q10: 300 mg daily  - get 7-9 hours of sleep per night; can try supplementing melatonin 2-10 mg, 2-3 hours before bedtime  - drink plenty of fluids (urine should be nearly clear)  - avoid excessive caffeine intake (no more than 2 servings per day and nothing in the afternoon)  - eat regular meals (don't skip meals)  - get moderate exercise (even just a 20 minute walk daily)     Rescue:  - Fioricet, do not use more than 2 days/week  - do not use analgesics (e.g.acetaminophen) more than 2 days per week in order to avoid analgesic rebound headaches

## 2024-02-09 NOTE — PROGRESS NOTES
Subjective:     Chief Complaint   Patient presents with    Vertigo     3x day    Nausea     3x days     Dizziness    Headache     Pt states that ever since the vertigo started she has had these      HPI:   Asher presents today with headache, dizziness and nausea x 3 days.    Patient chronically taking amitriptyline increased to 25 mg nightly, last visit we discussed tapering this off to see if we can improve her constipation.  She was taking this previously for headache prophylaxis since she was approximately 28.  Prior to reducing she had been on 50 mg nightly for years.  She had titrated down to taking 25 mg every other night and had not noticed much difference in her chronic constipation.  Still having bowel movements once to twice a week but abdominal bloating has been significantly better with dietary changes and stopping intermittent fasting.    She has a history of chronic tension headache that she had been on TCA for but also has a history of migraine typically without aura but has gotten aura in the past.  Patient feels that her current symptoms are related to migraine although she reports that the current headache is not severe.  She endorses fatigue, photophobia, intermittent dizziness and had some nausea with vomiting.  Typically will take Fioricet for severe tension headache or migraine with good relief but did not have any at home.  Trialed over-the-counter medication without relief for her symptoms.  Did feel significantly stressed last night so took a Xanax which was previously prescribed for her.  She does follow with a psychiatrist.    She states that prior to the onset of the symptoms she had an argument with her spouse and developed neck pain which she believes triggered her symptoms.    Patient is followed by her gynecologist and was briefly taken off her topical estrogen when she had some postmenopausal bleeding.  She had an ultrasound which was reassuring and she has a follow-up with her  "gynecologist to consider an IUD.  Patient does want to continue on her HRT as it has been very beneficial.  She denies any current aura with this episode.    Review of Systems   Constitutional:  Positive for malaise/fatigue. Negative for chills, fever and weight loss.   HENT:  Positive for tinnitus (acute on chronic).    Eyes:  Positive for photophobia.   Respiratory:  Negative for cough and shortness of breath.    Cardiovascular:  Negative for chest pain.   Gastrointestinal:  Positive for nausea. Negative for abdominal pain, diarrhea and vomiting.   Musculoskeletal:  Positive for neck pain.   Skin:  Negative for rash.   Neurological:  Positive for dizziness and headaches.     Objective:     Exam:  /88 (BP Location: Right arm, Patient Position: Sitting, BP Cuff Size: Adult)   Pulse 74   Temp 36.4 °C (97.6 °F) (Temporal)   Resp 20   Ht 1.676 m (5' 6\")   Wt 71.8 kg (158 lb 4.6 oz)   SpO2 97%   BMI 25.55 kg/m²  Body mass index is 25.55 kg/m².    Physical Exam  Vitals reviewed.   Constitutional:       General: She is not in acute distress.     Appearance: Normal appearance. She is not ill-appearing.   HENT:      Head: Normocephalic and atraumatic.      Nose: Nose normal. No congestion or rhinorrhea.      Mouth/Throat:      Mouth: Mucous membranes are moist.      Pharynx: No oropharyngeal exudate or posterior oropharyngeal erythema.   Eyes:      Extraocular Movements: Extraocular movements intact.      Conjunctiva/sclera: Conjunctivae normal.      Pupils: Pupils are equal, round, and reactive to light.   Cardiovascular:      Rate and Rhythm: Normal rate and regular rhythm.      Heart sounds: Normal heart sounds. No murmur heard.  Pulmonary:      Effort: Pulmonary effort is normal. No respiratory distress.      Breath sounds: Normal breath sounds. No stridor. No wheezing, rhonchi or rales.   Musculoskeletal:      Cervical back: Normal range of motion and neck supple. No rigidity or tenderness.      Right " lower leg: No edema.      Left lower leg: No edema.   Neurological:      General: No focal deficit present.      Mental Status: She is alert and oriented to person, place, and time.      Cranial Nerves: No cranial nerve deficit.   Psychiatric:         Mood and Affect: Mood normal.         Behavior: Behavior normal.         Thought Content: Thought content normal.       Labs: Reviewed from 12/12/2024  Imaging: Reviewed from 7/11/2021 MRI brain    Assessment & Plan:     53 y.o. female with the following -     1. Migraine without aura and with status migrainosus, not intractable  Acute on chronic and triggered by recent stressors in this patient with chronic tension type headache previously well-controlled with amitriptyline 50 mg nightly.  Will resume prior effective dose for prophylaxis. She also continues with propranolol 120 mg CR daily. Treat current migraine symptoms with Phenergan (reviewed side effects) and patient in the past has had relief with Fioricet which she has tolerated well.  Reports triptans in the past and effective over would raise blood pressure.  PDMP reviewed, controlled substance and urine drug screen obtained today.  Gave ER precautions.  Continue headache diary to identify and avoid triggers.  She also continues with propranolol 120 mg CR daily.  Discussed importance of avoiding overuse of abortive medications which may cause rebound headaches.  We did discuss that if she were to have recurring migraine with aura she would consider discontinuing her estrogen due to its risk associated.  Patient expressed understanding.  - amitriptyline (ELAVIL) 50 MG Tab; Take 1 Tablet by mouth every evening. For headache prevention  Dispense: 90 Tablet; Refill: 3  - butalbital/apap/caffeine (FIORICET) -40 mg Tab; Take 1 Tablet by mouth every 6 hours as needed for Headache or Migraine for up to 30 days.  Dispense: 30 Tablet; Refill: 0  - promethazine (PHENERGAN) 25 MG Tab; Take 1 Tablet by mouth every 6  hours as needed for Nausea/Vomiting (migraine as directed).  Dispense: 30 Tablet; Refill: 3    2. Episodic tension-type headache, not intractable  Chronic, previously very well-controlled with amitriptyline 50 mg daily for prophylaxis which was attempted to wean off due to chronic history of constipation.  Will resume prior effective dose.  Refilled Fioricet as below.  - amitriptyline (ELAVIL) 50 MG Tab; Take 1 Tablet by mouth every evening. For headache prevention  Dispense: 90 Tablet; Refill: 3  - butalbital/apap/caffeine (FIORICET) -40 mg Tab; Take 1 Tablet by mouth every 6 hours as needed for Headache or Migraine for up to 30 days.  Dispense: 30 Tablet; Refill: 0    3. High risk medication use  - Controlled Substance Treatment Agreement  - PAIN MANAGEMENT SCRN, W/ RFLX TO QNT; Future    I spent a total of 30 minutes with record review, exam, communication with the patient, and documentation of this encounter.    Return if symptoms worsen or fail to improve.    Please note that this dictation was created using voice recognition software. I have made every reasonable attempt to correct obvious errors, but I expect that there are errors of grammar and possibly content that I did not discover before finalizing the note.

## 2024-02-11 LAB
AMPHET CTO UR CFM-MCNC: NEGATIVE NG/ML
BARBITURATES CTO UR CFM-MCNC: NEGATIVE NG/ML
BENZODIAZ CTO UR CFM-MCNC: NEGATIVE NG/ML
BUPRENORPHINE UR-MCNC: NEGATIVE NG/ML
CANNABINOIDS CTO UR CFM-MCNC: NEGATIVE NG/ML
CARISOPRODOL UR-MCNC: NEGATIVE NG/ML
COCAINE CTO UR CFM-MCNC: NEGATIVE NG/ML
CREAT UR-MCNC: 66.3 MG/DL (ref 20–400)
DRUG SCREEN COMMENT UR-IMP: NORMAL
ETHYL GLUCURONIDE UR QL SCN: NEGATIVE NG/ML
FENTANYL UR-MCNC: NEGATIVE NG/ML
MEPERIDINE CTO UR SCN-MCNC: NEGATIVE NG/ML
METHADONE CTO UR CFM-MCNC: NEGATIVE NG/ML
OPIATES UR QL SCN: NORMAL NG/ML
OXYCDOXYM URSCRN 2005102: NEGATIVE NG/ML
PCP CTO UR CFM-MCNC: NEGATIVE NG/ML
PROPOXYPH CTO UR CFM-MCNC: NEGATIVE NG/ML
TAPENTADOL UR-MCNC: NEGATIVE NG/ML
TRAMADOL CTO UR SCN-MCNC: NEGATIVE NG/ML
ZOLPIDEM UR-MCNC: NORMAL NG/ML

## 2024-02-15 ENCOUNTER — HOSPITAL ENCOUNTER (OUTPATIENT)
Dept: LAB | Facility: MEDICAL CENTER | Age: 54
End: 2024-02-15
Payer: OTHER GOVERNMENT

## 2024-02-15 LAB
6MAM UR CFM-MCNC: <10 NG/ML
CODEINE UR CFM-MCNC: 94 NG/ML
HYDROCODONE UR CFM-MCNC: <20 NG/ML
HYDROMORPHONE UR CFM-MCNC: <20 NG/ML
MORPHINE UR CFM-MCNC: <20 NG/ML
NORHYDROCODONE UR CFM-MCNC: <20 NG/ML
NOROXYCODONE UR CFM-MCNC: <20 NG/ML
OPIATES UR NOROXYM Q0836: <20 NG/ML
OXYCODONE UR CFM-MCNC: <20 NG/ML
OXYMORPHONE UR CFM-MCNC: <20 NG/ML
ZOLPIDEM UR CFM-MCNC: <20 NG/ML

## 2024-02-15 PROCEDURE — 87491 CHLMYD TRACH DNA AMP PROBE: CPT

## 2024-02-15 PROCEDURE — 87591 N.GONORRHOEAE DNA AMP PROB: CPT

## 2024-02-15 PROCEDURE — G0480 DRUG TEST DEF 1-7 CLASSES: HCPCS

## 2024-02-16 LAB
C TRACH DNA GENITAL QL NAA+PROBE: NEGATIVE
N GONORRHOEA DNA GENITAL QL NAA+PROBE: NEGATIVE
SPECIMEN SOURCE: NORMAL

## 2024-02-22 ENCOUNTER — APPOINTMENT (OUTPATIENT)
Dept: CARDIOLOGY | Facility: MEDICAL CENTER | Age: 54
End: 2024-02-22
Attending: INTERNAL MEDICINE
Payer: OTHER GOVERNMENT

## 2024-04-15 ENCOUNTER — HOSPITAL ENCOUNTER (OUTPATIENT)
Facility: MEDICAL CENTER | Age: 54
End: 2024-04-15
Payer: OTHER GOVERNMENT

## 2024-04-15 LAB — PATHOLOGY CONSULT NOTE: NORMAL

## 2024-04-15 PROCEDURE — 88305 TISSUE EXAM BY PATHOLOGIST: CPT

## 2024-07-09 ENCOUNTER — HOSPITAL ENCOUNTER (OUTPATIENT)
Facility: MEDICAL CENTER | Age: 54
End: 2024-07-09
Payer: OTHER GOVERNMENT

## 2024-07-09 LAB — PATHOLOGY CONSULT NOTE: NORMAL

## 2024-07-09 PROCEDURE — 88305 TISSUE EXAM BY PATHOLOGIST: CPT

## 2024-07-10 ENCOUNTER — HOSPITAL ENCOUNTER (OUTPATIENT)
Dept: LAB | Facility: MEDICAL CENTER | Age: 54
End: 2024-07-10
Attending: STUDENT IN AN ORGANIZED HEALTH CARE EDUCATION/TRAINING PROGRAM
Payer: OTHER GOVERNMENT

## 2024-07-10 ENCOUNTER — OFFICE VISIT (OUTPATIENT)
Dept: MEDICAL GROUP | Facility: LAB | Age: 54
End: 2024-07-10
Payer: OTHER GOVERNMENT

## 2024-07-10 VITALS
TEMPERATURE: 97.3 F | RESPIRATION RATE: 16 BRPM | SYSTOLIC BLOOD PRESSURE: 128 MMHG | HEIGHT: 66 IN | WEIGHT: 148 LBS | BODY MASS INDEX: 23.78 KG/M2 | DIASTOLIC BLOOD PRESSURE: 74 MMHG | HEART RATE: 73 BPM | OXYGEN SATURATION: 97 %

## 2024-07-10 DIAGNOSIS — I10 ESSENTIAL HYPERTENSION: ICD-10-CM

## 2024-07-10 DIAGNOSIS — Z00.00 HEALTHCARE MAINTENANCE: ICD-10-CM

## 2024-07-10 DIAGNOSIS — E55.9 VITAMIN D INSUFFICIENCY: ICD-10-CM

## 2024-07-10 DIAGNOSIS — E78.00 HYPERCHOLESTEROLEMIA WITH LDL GREATER THAN 190 MG/DL: ICD-10-CM

## 2024-07-10 LAB
25(OH)D3 SERPL-MCNC: 37 NG/ML (ref 30–100)
ALBUMIN SERPL BCP-MCNC: 4.5 G/DL (ref 3.2–4.9)
ALBUMIN/GLOB SERPL: 1.6 G/DL
ALP SERPL-CCNC: 96 U/L (ref 30–99)
ALT SERPL-CCNC: 6 U/L (ref 2–50)
ANION GAP SERPL CALC-SCNC: 12 MMOL/L (ref 7–16)
AST SERPL-CCNC: 10 U/L (ref 12–45)
BASOPHILS # BLD AUTO: 1 % (ref 0–1.8)
BASOPHILS # BLD: 0.05 K/UL (ref 0–0.12)
BILIRUB SERPL-MCNC: 0.4 MG/DL (ref 0.1–1.5)
BUN SERPL-MCNC: 17 MG/DL (ref 8–22)
CALCIUM ALBUM COR SERPL-MCNC: 8.9 MG/DL (ref 8.5–10.5)
CALCIUM SERPL-MCNC: 9.3 MG/DL (ref 8.5–10.5)
CHLORIDE SERPL-SCNC: 103 MMOL/L (ref 96–112)
CHOLEST SERPL-MCNC: 364 MG/DL (ref 100–199)
CO2 SERPL-SCNC: 23 MMOL/L (ref 20–33)
CREAT SERPL-MCNC: 1.08 MG/DL (ref 0.5–1.4)
EOSINOPHIL # BLD AUTO: 0.21 K/UL (ref 0–0.51)
EOSINOPHIL NFR BLD: 4.2 % (ref 0–6.9)
ERYTHROCYTE [DISTWIDTH] IN BLOOD BY AUTOMATED COUNT: 40.1 FL (ref 35.9–50)
EST. AVERAGE GLUCOSE BLD GHB EST-MCNC: 100 MG/DL
FASTING STATUS PATIENT QL REPORTED: NORMAL
FERRITIN SERPL-MCNC: 91.2 NG/ML (ref 10–291)
GFR SERPLBLD CREATININE-BSD FMLA CKD-EPI: 60 ML/MIN/1.73 M 2
GLOBULIN SER CALC-MCNC: 2.8 G/DL (ref 1.9–3.5)
GLUCOSE SERPL-MCNC: 86 MG/DL (ref 65–99)
HBA1C MFR BLD: 5.1 % (ref 4–5.6)
HCT VFR BLD AUTO: 39 % (ref 37–47)
HDLC SERPL-MCNC: 41 MG/DL
HGB BLD-MCNC: 13.2 G/DL (ref 12–16)
IMM GRANULOCYTES # BLD AUTO: 0 K/UL (ref 0–0.11)
IMM GRANULOCYTES NFR BLD AUTO: 0 % (ref 0–0.9)
IRON SATN MFR SERPL: 22 % (ref 15–55)
IRON SERPL-MCNC: 52 UG/DL (ref 40–170)
LDLC SERPL CALC-MCNC: 304 MG/DL
LYMPHOCYTES # BLD AUTO: 2.08 K/UL (ref 1–4.8)
LYMPHOCYTES NFR BLD: 41.7 % (ref 22–41)
MCH RBC QN AUTO: 30.6 PG (ref 27–33)
MCHC RBC AUTO-ENTMCNC: 33.8 G/DL (ref 32.2–35.5)
MCV RBC AUTO: 90.5 FL (ref 81.4–97.8)
MONOCYTES # BLD AUTO: 0.41 K/UL (ref 0–0.85)
MONOCYTES NFR BLD AUTO: 8.2 % (ref 0–13.4)
NEUTROPHILS # BLD AUTO: 2.24 K/UL (ref 1.82–7.42)
NEUTROPHILS NFR BLD: 44.9 % (ref 44–72)
NRBC # BLD AUTO: 0 K/UL
NRBC BLD-RTO: 0 /100 WBC (ref 0–0.2)
PLATELET # BLD AUTO: 252 K/UL (ref 164–446)
PMV BLD AUTO: 11.3 FL (ref 9–12.9)
POTASSIUM SERPL-SCNC: 4.6 MMOL/L (ref 3.6–5.5)
PROT SERPL-MCNC: 7.3 G/DL (ref 6–8.2)
RBC # BLD AUTO: 4.31 M/UL (ref 4.2–5.4)
SODIUM SERPL-SCNC: 138 MMOL/L (ref 135–145)
TIBC SERPL-MCNC: 238 UG/DL (ref 250–450)
TRIGL SERPL-MCNC: 95 MG/DL (ref 0–149)
TSH SERPL DL<=0.005 MIU/L-ACNC: 2.32 UIU/ML (ref 0.38–5.33)
UIBC SERPL-MCNC: 186 UG/DL (ref 110–370)
VIT B12 SERPL-MCNC: 253 PG/ML (ref 211–911)
WBC # BLD AUTO: 5 K/UL (ref 4.8–10.8)

## 2024-07-10 PROCEDURE — 80061 LIPID PANEL: CPT

## 2024-07-10 PROCEDURE — 83695 ASSAY OF LIPOPROTEIN(A): CPT

## 2024-07-10 PROCEDURE — 84443 ASSAY THYROID STIM HORMONE: CPT

## 2024-07-10 PROCEDURE — 85025 COMPLETE CBC W/AUTO DIFF WBC: CPT

## 2024-07-10 PROCEDURE — 82306 VITAMIN D 25 HYDROXY: CPT

## 2024-07-10 PROCEDURE — 83550 IRON BINDING TEST: CPT

## 2024-07-10 PROCEDURE — 83540 ASSAY OF IRON: CPT

## 2024-07-10 PROCEDURE — 80053 COMPREHEN METABOLIC PANEL: CPT

## 2024-07-10 PROCEDURE — 3074F SYST BP LT 130 MM HG: CPT | Performed by: STUDENT IN AN ORGANIZED HEALTH CARE EDUCATION/TRAINING PROGRAM

## 2024-07-10 PROCEDURE — 99214 OFFICE O/P EST MOD 30 MIN: CPT | Performed by: STUDENT IN AN ORGANIZED HEALTH CARE EDUCATION/TRAINING PROGRAM

## 2024-07-10 PROCEDURE — 83036 HEMOGLOBIN GLYCOSYLATED A1C: CPT

## 2024-07-10 PROCEDURE — 82728 ASSAY OF FERRITIN: CPT

## 2024-07-10 PROCEDURE — 36415 COLL VENOUS BLD VENIPUNCTURE: CPT

## 2024-07-10 PROCEDURE — 3078F DIAST BP <80 MM HG: CPT | Performed by: STUDENT IN AN ORGANIZED HEALTH CARE EDUCATION/TRAINING PROGRAM

## 2024-07-10 PROCEDURE — 82607 VITAMIN B-12: CPT

## 2024-07-10 RX ORDER — BUTALBITAL, ACETAMINOPHEN AND CAFFEINE 50; 325; 40 MG/1; MG/1; MG/1
1 TABLET ORAL EVERY 6 HOURS PRN
COMMUNITY
Start: 2024-02-09

## 2024-07-10 ASSESSMENT — ENCOUNTER SYMPTOMS
DIARRHEA: 1
WEIGHT LOSS: 0
VOMITING: 0
CHILLS: 0
ABDOMINAL PAIN: 0
INSOMNIA: 1
NAUSEA: 0
COUGH: 0
FEVER: 0
SHORTNESS OF BREATH: 0
CONSTIPATION: 1
BLOOD IN STOOL: 0

## 2024-07-10 ASSESSMENT — FIBROSIS 4 INDEX: FIB4 SCORE: 0.92

## 2024-07-10 ASSESSMENT — PATIENT HEALTH QUESTIONNAIRE - PHQ9: CLINICAL INTERPRETATION OF PHQ2 SCORE: 0

## 2024-07-12 LAB — LPA SERPL-MCNC: 26 MG/DL

## 2024-10-14 DIAGNOSIS — I10 ESSENTIAL HYPERTENSION: ICD-10-CM

## 2024-10-15 RX ORDER — PROPRANOLOL HYDROCHLORIDE 120 MG/1
120 CAPSULE, EXTENDED RELEASE ORAL DAILY
Qty: 90 CAPSULE | Refills: 3 | Status: SHIPPED | OUTPATIENT
Start: 2024-10-15

## 2024-10-15 RX ORDER — LOSARTAN POTASSIUM 100 MG/1
100 TABLET ORAL DAILY
Qty: 90 TABLET | Refills: 3 | Status: SHIPPED | OUTPATIENT
Start: 2024-10-15

## 2024-11-18 ENCOUNTER — HOSPITAL ENCOUNTER (OUTPATIENT)
Facility: MEDICAL CENTER | Age: 54
End: 2024-11-18
Payer: OTHER GOVERNMENT

## 2024-11-18 PROCEDURE — 88142 CYTOPATH C/V THIN LAYER: CPT

## 2024-11-30 LAB — THINPREP PAP, CYTOLOGY NL11781: NORMAL

## 2024-12-06 ENCOUNTER — OFFICE VISIT (OUTPATIENT)
Dept: MEDICAL GROUP | Facility: LAB | Age: 54
End: 2024-12-06
Payer: OTHER GOVERNMENT

## 2024-12-06 VITALS
SYSTOLIC BLOOD PRESSURE: 138 MMHG | TEMPERATURE: 97.6 F | HEART RATE: 76 BPM | WEIGHT: 127 LBS | OXYGEN SATURATION: 96 % | BODY MASS INDEX: 20.41 KG/M2 | DIASTOLIC BLOOD PRESSURE: 98 MMHG | HEIGHT: 66 IN | RESPIRATION RATE: 20 BRPM

## 2024-12-06 DIAGNOSIS — G47.00 INSOMNIA, UNSPECIFIED TYPE: ICD-10-CM

## 2024-12-06 DIAGNOSIS — F41.9 ANXIETY: ICD-10-CM

## 2024-12-06 DIAGNOSIS — E78.00 HYPERCHOLESTEROLEMIA WITH LDL GREATER THAN 190 MG/DL: ICD-10-CM

## 2024-12-06 DIAGNOSIS — F39 MOOD DISORDER (HCC): ICD-10-CM

## 2024-12-06 DIAGNOSIS — I10 ESSENTIAL HYPERTENSION: ICD-10-CM

## 2024-12-06 PROBLEM — F19.91 HISTORY OF DRUG USE: Status: ACTIVE | Noted: 2019-11-12

## 2024-12-06 PROCEDURE — 99215 OFFICE O/P EST HI 40 MIN: CPT | Performed by: STUDENT IN AN ORGANIZED HEALTH CARE EDUCATION/TRAINING PROGRAM

## 2024-12-06 PROCEDURE — 3078F DIAST BP <80 MM HG: CPT | Performed by: STUDENT IN AN ORGANIZED HEALTH CARE EDUCATION/TRAINING PROGRAM

## 2024-12-06 PROCEDURE — 3074F SYST BP LT 130 MM HG: CPT | Performed by: STUDENT IN AN ORGANIZED HEALTH CARE EDUCATION/TRAINING PROGRAM

## 2024-12-06 RX ORDER — ZOLPIDEM TARTRATE 5 MG/1
5 TABLET ORAL NIGHTLY PRN
COMMUNITY
Start: 2024-10-24 | End: 2024-12-06

## 2024-12-06 RX ORDER — QUETIAPINE FUMARATE 25 MG/1
25 TABLET, FILM COATED ORAL NIGHTLY
Qty: 90 TABLET | Refills: 0 | Status: SHIPPED | OUTPATIENT
Start: 2024-12-06 | End: 2024-12-06 | Stop reason: SDUPTHER

## 2024-12-06 RX ORDER — QUETIAPINE FUMARATE 25 MG/1
25 TABLET, FILM COATED ORAL NIGHTLY
Qty: 30 TABLET | Refills: 1 | Status: SHIPPED | OUTPATIENT
Start: 2024-12-06

## 2024-12-06 ASSESSMENT — ENCOUNTER SYMPTOMS
VOMITING: 0
CHILLS: 0
WEIGHT LOSS: 1
PALPITATIONS: 1
SHORTNESS OF BREATH: 0
ABDOMINAL PAIN: 0
INSOMNIA: 1
COUGH: 0
NERVOUS/ANXIOUS: 1
HEADACHES: 1
NAUSEA: 0
DIARRHEA: 0
FEVER: 0

## 2024-12-06 ASSESSMENT — FIBROSIS 4 INDEX: FIB4 SCORE: 0.91

## 2024-12-06 NOTE — PROGRESS NOTES
Verbal consent was acquired by the patient to use c-crowd ambient listening note generation during this visit Yes.    Subjective:     Chief Complaint   Patient presents with    Anxiety    Sleep Problem       HPI:     History of Present Illness  The patient is a 54-year-old female presenting with concerns related to sleep and anxiety.    She reports experiencing sleep disturbances and anxiety, which she believes are due to undiagnosed autism and ADHD. These symptoms have been present since her childhood. She has a history of lifelong sleep issues, which have worsened recently.     She was prescribed Ambien by a behavioral health provider which she finds beneficial/effective. She reports some what of a falling out with her provider that occurred in relation to her chronic Kratom use. Has about 20 ambien left. She has also tried CBD/THC gummies, but found them unhelpful. She has been prescribed Lunesta for sleep and clonidine but is unsure of their effectiveness. Remeron worked the best with sleep and somewhat helped anxiety but caused weight gain.    For her anxiety she reports that most often she can manage it but reports at times it is unbearable. She has tried Lamictal, Cymbalta, Effexor, and Paxil, but found they exacerbated her anxiety. She reports no history of bipolar disorder. She has never had a seizure. She denies having sleep apnea. Prior reported that Ativan/valium/hydroxyzine had no effect and she didn't like buspar prior either. Xanax has been helpful prior.     She has been using Kratom gummies for years, but feels like she could discontinue at any time. Has a history of non prescription opiate use in the past, but denies using. She reports she has seen an addiction specialist in the past who felt she was not an addict. UDS reviewed from 2/2024, patient confirms she is not taking or had not been taking codeine at the time-only Kratom.    She experiences daily headaches and has tried various  "medications, including amitriptyline, which she reduces to 25 mg every other day when she feels better. Continues with propranolol for BP and headaches, at times will take extra prn palpitations.     She saw her gynecologist on 10/15/2024 for follow up. She is on GLP-1 and is weaning off that now that she is at her goal weight.    Review of Systems   Constitutional:  Positive for malaise/fatigue and weight loss (with tirzepatide). Negative for chills and fever.   Respiratory:  Negative for cough and shortness of breath.    Cardiovascular:  Positive for palpitations. Negative for chest pain.   Gastrointestinal:  Negative for abdominal pain, diarrhea, nausea and vomiting.   Skin:  Negative for rash.   Neurological:  Positive for headaches.   Psychiatric/Behavioral:  The patient is nervous/anxious and has insomnia.      Objective:     Exam:  BP (!) 138/98 (BP Location: Left arm, Patient Position: Sitting, BP Cuff Size: Adult)   Pulse 76   Temp 36.4 °C (97.6 °F) (Temporal)   Resp 20   Ht 1.676 m (5' 6\")   Wt 57.6 kg (127 lb)   SpO2 96%   BMI 20.50 kg/m²  Body mass index is 20.5 kg/m².    Physical Exam  Vitals reviewed.   Constitutional:       General: She is not in acute distress.     Appearance: Normal appearance. She is not ill-appearing.   HENT:      Head: Normocephalic and atraumatic.      Nose: Nose normal.      Mouth/Throat:      Mouth: Mucous membranes are moist.   Pulmonary:      Effort: Pulmonary effort is normal.   Neurological:      General: No focal deficit present.      Mental Status: She is alert and oriented to person, place, and time.   Psychiatric:      Comments: Appearance: Clothing and grooming normal.  Mood: anxious  Behavior: No psychomotor abnormalities or impulsivity. Attention is good. Patient is pleasant and cooperative.   Eye contact: good   Affect: mood-congruent  Speech/thought content: Normal speech pattern. Normal insight and reasoning, no evidence of psychotic process. Denies " suicidal or homicidal thoughts.        Labs: Reviewed from 7/10/2024    Assessment & Plan:     54 y.o. female with the following -     1. Insomnia, unspecified type  2. Anxiety  3. Mood disorder (HCC)  Chronic, uncontrolled. Patient prefers to not be seen by previous behavorial health provider or establish with alternative provider in the same office. The patient suspects she may have undiagnosed autism and ADHD based on her symptoms and research. She is advised to seek a formal diagnosis from a neuropsychologist or psychologist-referred for both, also for therapy. A referral to a psychiatrist for further medication management given her history of either intolerance or ineffectiveness of multiple medications in the past, in the interim we will trial Seroquel as below for both mood and insomnia, patient to not take with zolpidem.  Potential side effects, including lowering the seizure threshold, were discussed. Avoiding controlled substances given abnormal urine drug screen and history of nonprescription opioid use in the past, currently using Kratom which may be contributing to symptoms-discussed with patient. Gave ER precautions for worsening symptoms, if progressive may benefit from inpatient psychiatric assessment.  - QUEtiapine (SEROQUEL) 25 MG Tab; Take 1 Tablet by mouth every evening.  Dispense: 30 Tablet; Refill: 1  - Referral to Psychiatry  - Referral to Behavioral Health  - Referral to Behavioral Health    4. Hypercholesterolemia with LDL greater than 190 mg/dL  Chronic and untreated at patient request.  Patient interested in repeating since she has had some weight loss.  Plan pending results.  - Comp Metabolic Panel; Future  - TSH WITH REFLEX TO FT4; Future  - Lipid Profile; Future  - CBC WITH DIFFERENTIAL; Future    5. Essential hypertension  Chronic, still sounds to be labile as initially blood pressure was quite good but on repeat was elevated.  For now she will continue her medication as below,  discussed not adjusting medication on her own without discussing first.    losartan (COZAAR) 100 MG Tab, TAKE 1 TABLET BY MOUTH EVERY DAY, Disp: 90 Tablet, Rfl: 3    propranolol CR (INDERAL LA) 120 MG CAPSULE SR 24 HR, TAKE 1 CAPSULE BY MOUTH EVERY DAY, Disp: 90 Capsule, Rfl: 3    I spent a total of 46 minutes with record review, exam, communication with the patient, and documentation of this encounter.    Return in 4 weeks (on 1/3/2025), or if symptoms worsen or fail to improve, for Blood pressure/sleep/mood.    Please note that this dictation was created using voice recognition software. I have made every reasonable attempt to correct obvious errors, but I expect that there are errors of grammar and possibly content that I did not discover before finalizing the note.

## 2025-01-08 ENCOUNTER — TELEPHONE (OUTPATIENT)
Dept: MEDICAL GROUP | Facility: LAB | Age: 55
End: 2025-01-08
Payer: OTHER GOVERNMENT

## 2025-01-08 DIAGNOSIS — E78.00 HYPERCHOLESTEROLEMIA WITH LDL GREATER THAN 190 MG/DL: ICD-10-CM

## 2025-01-08 DIAGNOSIS — I10 ESSENTIAL HYPERTENSION: ICD-10-CM

## 2025-01-16 ENCOUNTER — PATIENT MESSAGE (OUTPATIENT)
Dept: URGENT CARE | Facility: PHYSICIAN GROUP | Age: 55
End: 2025-01-16
Payer: OTHER GOVERNMENT

## 2025-01-16 DIAGNOSIS — E78.00 HYPERCHOLESTEROLEMIA WITH LDL GREATER THAN 190 MG/DL: ICD-10-CM

## 2025-01-16 DIAGNOSIS — E78.01 FAMILIAL HYPERCHOLESTEREMIA: ICD-10-CM

## 2025-01-16 RX ORDER — ROSUVASTATIN CALCIUM 40 MG/1
40 TABLET, COATED ORAL DAILY
Qty: 90 TABLET | Refills: 0 | Status: SHIPPED | OUTPATIENT
Start: 2025-01-16

## 2025-01-16 NOTE — PATIENT COMMUNICATION
Received request via: Patient    Was the patient seen in the last year in this department? Yes    Does the patient have an active prescription (recently filled or refills available) for medication(s) requested? No    [facility]   [id number] - [place] - [address] AT [facility] [address] [place] [address] [place] [id number]  Phone: [phone] Fax: [phone]          Does the patient have [facility] Plus and need 100-day supply? (This applies to ALL medications) Patient does not have SCP

## 2025-01-23 ENCOUNTER — OFFICE VISIT (OUTPATIENT)
Dept: BEHAVIORAL HEALTH | Facility: CLINIC | Age: 55
End: 2025-01-23
Payer: OTHER GOVERNMENT

## 2025-01-23 DIAGNOSIS — F41.1 GAD (GENERALIZED ANXIETY DISORDER): ICD-10-CM

## 2025-01-23 DIAGNOSIS — G47.09 OTHER INSOMNIA: ICD-10-CM

## 2025-01-23 PROCEDURE — 96127 BRIEF EMOTIONAL/BEHAV ASSMT: CPT | Performed by: PSYCHIATRY & NEUROLOGY

## 2025-01-23 PROCEDURE — 99204 OFFICE O/P NEW MOD 45 MIN: CPT | Performed by: PSYCHIATRY & NEUROLOGY

## 2025-01-23 RX ORDER — TRAZODONE HYDROCHLORIDE 100 MG/1
TABLET ORAL
Qty: 60 TABLET | Refills: 1 | Status: SHIPPED | OUTPATIENT
Start: 2025-01-23

## 2025-01-23 ASSESSMENT — ANXIETY QUESTIONNAIRES
2. NOT BEING ABLE TO STOP OR CONTROL WORRYING: NEARLY EVERY DAY
3. WORRYING TOO MUCH ABOUT DIFFERENT THINGS: NEARLY EVERY DAY
1. FEELING NERVOUS, ANXIOUS, OR ON EDGE: NEARLY EVERY DAY
6. BECOMING EASILY ANNOYED OR IRRITABLE: NEARLY EVERY DAY
IF YOU CHECKED OFF ANY PROBLEMS ON THIS QUESTIONNAIRE, HOW DIFFICULT HAVE THESE PROBLEMS MADE IT FOR YOU TO DO YOUR WORK, TAKE CARE OF THINGS AT HOME, OR GET ALONG WITH OTHER PEOPLE: VERY DIFFICULT
7. FEELING AFRAID AS IF SOMETHING AWFUL MIGHT HAPPEN: NEARLY EVERY DAY
GAD7 TOTAL SCORE: 20
5. BEING SO RESTLESS THAT IT IS HARD TO SIT STILL: MORE THAN HALF THE DAYS
4. TROUBLE RELAXING: NEARLY EVERY DAY

## 2025-01-23 ASSESSMENT — PATIENT HEALTH QUESTIONNAIRE - PHQ9
CLINICAL INTERPRETATION OF PHQ2 SCORE: 2
SUM OF ALL RESPONSES TO PHQ QUESTIONS 1-9: 13
5. POOR APPETITE OR OVEREATING: 2 - MORE THAN HALF THE DAYS

## 2025-01-23 NOTE — PROGRESS NOTES
"  INITIAL PSYCHIATRIC EVALUATION      This provider informed the patient their medical records are totally confidential except for the use by other providers involved in their care, or if the patient signs a release, or to report instances of child or elder abuse, or if it is determined they are an immediate risk to harm themselves or others.      CHIEF COMPLAINT  \"Need help with sleep and anxiety\"      HISTORY OF PRESENT ILLNESS  Asher Galeas is a 54 y.o. old female comes in today to establish care and for evaluation of anxiety and insomnia.  I did reviewed all outpatient psychiatry follow up notes over last 3 years. Patient is new to the clinic.     History of Present Illness    She reports chronic anxiety, which has escalated to the point of experiencing numbness in her arms and generalized weakness, almost to the point of dissociation or fainting.   She has been experiencing long-term sleep issues, which she believes are contributing to her anxiety.   her symptoms worsened with the onset of menopause.   She recalls sleep disturbances from her childhood, which were less bothersome during her college years due to her busy schedule. However, as she aged and experienced hormonal changes, her sleep issues became more pronounced. She has not consulted a sleep specialist.   Patient scored 20 on MCIHELLE-7 with dominance of anxiety on a daily basis with her worrying about multiple topics with difficulty relaxing self, irritability and worrying about something negative happening.  Patient scored 13 on PHQ-9 but the school is dominant for poor sleep with resultant symptoms.  She reports that her depression is a secondary issue, resulting from her suffering.   She has a history of seeing a psychiatrist once in her 20s following a breakdown triggered by a series of stressful events, including liposuction, wisdom teeth extraction complicated by dry sockets, and a breakup with her boyfriend. 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 She was prescribed Xanax by her general practitioner, which proved ineffective, leading her to seek psychiatric help. She was then prescribed Remeron and Valium, which initially helped regulate her nervous system and improve her sleep. However, she discontinued Remeron after gaining 20 pounds and returned to work after a 2-month leave of absence. She continued to take Valium as needed but did not request a refill.   In her early 30s, she sought help from a new general practitioner for persistent heart racing and was started on propranolol 120 mg, which she takes at night. She reports that her anxiety intensifies at night. She has a history of chronic tension headaches, occurring every 6 to 8 weeks, for which she was prescribed amitriptyline 25 mg by a neurologist. This medication reduced the frequency of her headaches but did not improve her sleep.   She tried Seroquel once, which worsened her anxiety and did not induce sleep or tiredness.   She has tried various antidepressants, including Cymbalta, Paxil, and Wellbutrin, all of which exacerbated her anxiety.   She is currently taking Kratom 30 mg once daily in the morning, which she reports helps her get going. She has tried clonidine 0.1 mg and Lunesta for sleep, but was concerned about potential low blood pressure due to her concurrent use of propranolol. She has gabapentin 100 mg at home but has not taken it regularly.   Related to stop the Kratom completely as this can cause withdrawal reactivity resultant anxiety and insomnia.  After reviewing risk and benefit agreed with considering Zoloft and trazodone as discussed below and referral to sleep medicine for further evaluation also.      PSYCHIATRIC REVIEW OF SYSTEMS: denies depressive symptoms and see HPI for anxeity symptoms      MEDICAL REVIEW OF SYSTEMS:   Constitutional negative   Eyes negative    Ears/Nose/Mouth/Throat negative   Cardiovascular negative   Respiratory negative   Gastrointestinal negative   Genitourinary negative   Muscular negative   Integumentary negative   Neurological negative   Endocrine negative   Hematologic/Lymphatic negative     CURRENT MEDICATIONS:  Current Outpatient Medications   Medication Sig Dispense Refill    rosuvastatin (CRESTOR) 40 MG tablet Take 1 Tablet by mouth every day. 90 Tablet 0    QUEtiapine (SEROQUEL) 25 MG Tab Take 1 Tablet by mouth every evening. 30 Tablet 1    losartan (COZAAR) 100 MG Tab TAKE 1 TABLET BY MOUTH EVERY DAY 90 Tablet 3    propranolol CR (INDERAL LA) 120 MG CAPSULE SR 24 HR TAKE 1 CAPSULE BY MOUTH EVERY DAY 90 Capsule 3    butalbital/apap/caffeine (FIORICET) -40 mg Tab Take 1 Tablet by mouth every 6 hours as needed for Migraine.      Tirzepatide 2.5 MG/0.5ML Solution Pen-injector Inject 2.5 mg under the skin every 7 days.      Levonorgestrel (MIRENA, 52 MG, IU) by Intrauterine route.      amitriptyline (ELAVIL) 50 MG Tab Take 1 Tablet by mouth every evening. For headache prevention 90 Tablet 3    progesterone (PROMETRIUM) 100 MG Cap       estradiol (WILFRIDO) 0.05 MG/24HR PATCH BIWEEKLY        No current facility-administered medications for this visit.       ALLERGIES:  Atorvastatin and Hydrochlorothiazide        PAST PSYCHIATRIC MEDICATIONS  Paxil  Effexor, Cymbalta  Mirtazapine  Lamictal  Ambien, Lunesta, Seroquel, Amitriptyline. 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  Musculoskeletal: Normal gait.   Abnormal movements: none    MENTAL STATUS EXAMINATION      General:   - Grooming and hygiene: Casual,   - Apparent distress: tense,   - Behavior: Tense  - Eye Contact:  Good,   - no psychomotor agitation or retardation    - Participation: Active verbal participation  Orientation: Alert and Fully Oriented to person, place and time  Mood: Anxious  Affect: Flexible,  Thought Process: Logical and Goal-directed  Thought Content: Denies suicidal or homicidal ideations, intent or plan   Perception: Denies auditory or visual hallucinations. No delusions noted   Attention span and concentration: Intact   Speech:Rate within normal limits and Volume within normal limits  Language: Appropriate   Insight: Adequate  Judgment: Adequate  Recent and remote memory: No gross evidence of memory deficits      DEPRESSION SCREENIN/30/2022    10:00 AM 2023     4:00 PM 7/10/2024     1:00 PM   Depression Screen (PHQ-2/PHQ-9)   PHQ-2 Total Score 0 0 0       Interpretation of PHQ-9 Total Score   Score Severity   1-4 No Depression   5-9 Mild Depression   10-14 Moderate Depression   15-19 Moderately Severe Depression   20-27 Severe Depression      SAFETY ASSESSMENT - SELF:    Does patient acknowledge current or past symptoms of dangerousness to self? no  History of suicide by family member: no  History of suicide by friend/significant other: no  Recent change in amount/specificity/intensity of suicidal thoughts or self-harm behavior? no  Current access to firearms, medications, or other identified means of suicide/self-harm? no  Protective factors present: family       SAFETY ASSESSMENT - OTHERS:    Does patient acknowledge current or past symptoms of aggressive behavior or risk to others? no  Recent change in amount/specificity/intensity of thoughts or threats to harm others? no  Current access to firearms/other identified means of harm? no       CURRENT RISK:       Suicidal:  Low       Homicidal: Low       Self-Harm: Low       Relapse: Low       Crisis Safety Plan Reviewed Not Indicated    MEDICAL RECORDS/LABS/DIAGNOSTIC TESTS REVIEWED:  Component  Ref Range & Units 6 mo ago   Vitamin B12 -True Cobalamin  211 - 911 pg/mL 253     Component      Latest Ref Rng 7/10/2024   TSH      0.380 - 5.330 uIU/mL 2.320            [place]  records -   Reviewed    Assessment & Plan      (1) MICHELLE; (2) Insomnia  PHQ9: 13; GAD7: 20  Add Zoloft 25 mg daily X 1 week --> 50 mg daily X 2 weeks --> 100 mg daily.  Add Trazodone 100-150-200 mg HS PRN for sleep.  Stop Kratom use  Referral placed to sleep medicine for evaluation of chronic insomnia.  Medication options, alternatives (including no medications) and medication risks/benefits/side effects were discussed in detail.  Explained importance of contraceptive measures while on psychotropic medications, educated to let provider know if ever pregnant or wanting to become pregnant. Verbalized understanding.  The patient was advised to call, message provider on [facility], or come in to the clinic if symptoms worsen or if any future questions/issues regarding their medications arise; the patient verbalized understanding and agreement.    The patient was educated to call 911, call the suicide hotline, or go to local ER if having thoughts of suicide or homicide; verbalized understanding.        Return to clinic in 5 weeks or sooner if symptoms worsen.  Next Appointment:  instruction provided on how to make the next appointment.     The proposed treatment plan was discussed with the patient who was provided the opportunity to ask questions and make suggestions regarding alternative treatment. Patient verbalized understanding and expressed agreement with the plan.     Thank you for allowing me to participate in the care of this patient.    Shree Plata M.D.  01/23/25    CC:   Marsha Christianson D.O.    This note was created using voice recognition software (Dragon). The  accuracy of the dictation is limited by the abilities of the software. I have reviewed the note prior to signing, however some errors in grammar and context are still possible. If you have any questions related to this note please do not hesitate to contact our office.

## 2025-02-03 DIAGNOSIS — G47.09 OTHER INSOMNIA: ICD-10-CM

## 2025-02-04 LAB
ALBUMIN SERPL-MCNC: 4.7 G/DL (ref 3.8–4.9)
ALP SERPL-CCNC: 78 IU/L (ref 44–121)
ALT SERPL-CCNC: 10 IU/L (ref 0–32)
AST SERPL-CCNC: 13 IU/L (ref 0–40)
BASOPHILS # BLD AUTO: 0 X10E3/UL (ref 0–0.2)
BASOPHILS NFR BLD AUTO: 1 %
BILIRUB SERPL-MCNC: 0.5 MG/DL (ref 0–1.2)
BUN SERPL-MCNC: 13 MG/DL (ref 6–24)
BUN/CREAT SERPL: 16 (ref 9–23)
CALCIUM SERPL-MCNC: 9.6 MG/DL (ref 8.7–10.2)
CHLORIDE SERPL-SCNC: 105 MMOL/L (ref 96–106)
CHOLEST SERPL-MCNC: 292 MG/DL (ref 100–199)
CO2 SERPL-SCNC: 24 MMOL/L (ref 20–29)
CREAT SERPL-MCNC: 0.8 MG/DL (ref 0.57–1)
EGFRCR SERPLBLD CKD-EPI 2021: 86 ML/MIN/1.73
EOSINOPHIL # BLD AUTO: 0.1 X10E3/UL (ref 0–0.4)
EOSINOPHIL NFR BLD AUTO: 2 %
ERYTHROCYTE [DISTWIDTH] IN BLOOD BY AUTOMATED COUNT: 12.7 % (ref 11.7–15.4)
GLOBULIN SER CALC-MCNC: 2.3 G/DL (ref 1.5–4.5)
GLUCOSE SERPL-MCNC: 85 MG/DL (ref 70–99)
HCT VFR BLD AUTO: 40.7 % (ref 34–46.6)
HDLC SERPL-MCNC: 60 MG/DL
HGB BLD-MCNC: 13.3 G/DL (ref 11.1–15.9)
IMM GRANULOCYTES # BLD AUTO: 0 X10E3/UL (ref 0–0.1)
IMM GRANULOCYTES NFR BLD AUTO: 0 %
IMMATURE CELLS  115398: NORMAL
LDL CALC COMMENT:: ABNORMAL
LDLC SERPL CALC-MCNC: 223 MG/DL (ref 0–99)
LYMPHOCYTES # BLD AUTO: 2 X10E3/UL (ref 0.7–3.1)
LYMPHOCYTES NFR BLD AUTO: 31 %
MCH RBC QN AUTO: 29.8 PG (ref 26.6–33)
MCHC RBC AUTO-ENTMCNC: 32.7 G/DL (ref 31.5–35.7)
MCV RBC AUTO: 91 FL (ref 79–97)
MONOCYTES # BLD AUTO: 0.4 X10E3/UL (ref 0.1–0.9)
MONOCYTES NFR BLD AUTO: 6 %
MORPHOLOGY BLD-IMP: NORMAL
NEUTROPHILS # BLD AUTO: 3.8 X10E3/UL (ref 1.4–7)
NEUTROPHILS NFR BLD AUTO: 60 %
NRBC BLD AUTO-RTO: NORMAL %
PLATELET # BLD AUTO: 278 X10E3/UL (ref 150–450)
POTASSIUM SERPL-SCNC: 4.4 MMOL/L (ref 3.5–5.2)
PROT SERPL-MCNC: 7 G/DL (ref 6–8.5)
RBC # BLD AUTO: 4.47 X10E6/UL (ref 3.77–5.28)
SODIUM SERPL-SCNC: 141 MMOL/L (ref 134–144)
TRIGL SERPL-MCNC: 61 MG/DL (ref 0–149)
TSH SERPL DL<=0.005 MIU/L-ACNC: 1.57 UIU/ML (ref 0.45–4.5)
VLDLC SERPL CALC-MCNC: 9 MG/DL (ref 5–40)
WBC # BLD AUTO: 6.3 X10E3/UL (ref 3.4–10.8)

## 2025-02-06 ENCOUNTER — TELEPHONE (OUTPATIENT)
Dept: SCHEDULING | Facility: IMAGING CENTER | Age: 55
End: 2025-02-06
Payer: OTHER GOVERNMENT

## 2025-02-12 ENCOUNTER — APPOINTMENT (OUTPATIENT)
Dept: RADIOLOGY | Facility: MEDICAL CENTER | Age: 55
End: 2025-02-12
Attending: STUDENT IN AN ORGANIZED HEALTH CARE EDUCATION/TRAINING PROGRAM
Payer: OTHER GOVERNMENT

## 2025-02-12 DIAGNOSIS — Z12.31 VISIT FOR SCREENING MAMMOGRAM: ICD-10-CM

## 2025-02-14 ENCOUNTER — APPOINTMENT (OUTPATIENT)
Dept: RADIOLOGY | Facility: IMAGING CENTER | Age: 55
End: 2025-02-14
Attending: FAMILY MEDICINE
Payer: OTHER GOVERNMENT

## 2025-02-14 ENCOUNTER — OFFICE VISIT (OUTPATIENT)
Dept: URGENT CARE | Facility: PHYSICIAN GROUP | Age: 55
End: 2025-02-14
Payer: OTHER GOVERNMENT

## 2025-02-14 VITALS
RESPIRATION RATE: 18 BRPM | WEIGHT: 131 LBS | SYSTOLIC BLOOD PRESSURE: 140 MMHG | BODY MASS INDEX: 21.05 KG/M2 | DIASTOLIC BLOOD PRESSURE: 80 MMHG | HEART RATE: 80 BPM | TEMPERATURE: 98.7 F | HEIGHT: 66 IN | OXYGEN SATURATION: 97 %

## 2025-02-14 DIAGNOSIS — R05.3 CHRONIC COUGH: ICD-10-CM

## 2025-02-14 DIAGNOSIS — R05.2 SUBACUTE COUGH: ICD-10-CM

## 2025-02-14 PROCEDURE — 71046 X-RAY EXAM CHEST 2 VIEWS: CPT | Mod: TC | Performed by: RADIOLOGY

## 2025-02-14 PROCEDURE — 3077F SYST BP >= 140 MM HG: CPT | Performed by: FAMILY MEDICINE

## 2025-02-14 PROCEDURE — 3079F DIAST BP 80-89 MM HG: CPT | Performed by: FAMILY MEDICINE

## 2025-02-14 PROCEDURE — 99213 OFFICE O/P EST LOW 20 MIN: CPT | Performed by: FAMILY MEDICINE

## 2025-02-14 RX ORDER — DOXYCYCLINE HYCLATE 100 MG
100 TABLET ORAL 2 TIMES DAILY
Qty: 14 TABLET | Refills: 0 | Status: SHIPPED | OUTPATIENT
Start: 2025-02-14

## 2025-02-14 RX ORDER — QUETIAPINE FUMARATE 50 MG/1
TABLET, FILM COATED ORAL
COMMUNITY
Start: 2025-01-27 | End: 2025-02-27 | Stop reason: SDUPTHER

## 2025-02-14 ASSESSMENT — ENCOUNTER SYMPTOMS
COUGH: 1
FEVER: 0

## 2025-02-14 ASSESSMENT — FIBROSIS 4 INDEX: FIB4 SCORE: 0.83

## 2025-02-14 NOTE — PROGRESS NOTES
Subjective:     Asher Galeas is a 54 y.o. female who presents for Congestion (Feels it in the chest, cough ) and Rash (L side of arm )    HPI  Pt presents for evaluation of an acute problem  Pt with an illness the past 4 weeks   Having cough and chest congestion   Feeling fatigued   Cough is productive   Having thick sputum and did have blood tinged sputum once   Has new spot on left arm and is concerned it could be chicken pox     Review of Systems   Constitutional:  Negative for fever.   HENT:  Positive for congestion.    Respiratory:  Positive for cough.      PMH:  has a past medical history of Abdominal bloating (4/25/2023), Cancer (HCC), Elevated alkaline phosphatase level (8/30/2022), Elevated lipoprotein(a) (6/8/2023), Head ache, Hyperlipidemia, Hypertension, Hyponatremia (8/30/2022), Migraine, and Upper abdominal pain (4/25/2023).  MEDS:   Current Outpatient Medications:     QUEtiapine (SEROQUEL) 50 MG tablet, , Disp: , Rfl:     doxycycline (VIBRAMYCIN) 100 MG Tab, Take 1 Tablet by mouth 2 times a day., Disp: 14 Tablet, Rfl: 0    rosuvastatin (CRESTOR) 40 MG tablet, Take 1 Tablet by mouth every day., Disp: 90 Tablet, Rfl: 0    losartan (COZAAR) 100 MG Tab, TAKE 1 TABLET BY MOUTH EVERY DAY, Disp: 90 Tablet, Rfl: 3    propranolol CR (INDERAL LA) 120 MG CAPSULE SR 24 HR, TAKE 1 CAPSULE BY MOUTH EVERY DAY, Disp: 90 Capsule, Rfl: 3    butalbital/apap/caffeine (FIORICET) -40 mg Tab, Take 1 Tablet by mouth every 6 hours as needed for Migraine., Disp: , Rfl:     Levonorgestrel (MIRENA, 52 MG, IU), by Intrauterine route., Disp: , Rfl:     amitriptyline (ELAVIL) 50 MG Tab, Take 1 Tablet by mouth every evening. For headache prevention, Disp: 90 Tablet, Rfl: 3    estradiol (WILFRIDO) 0.05 MG/24HR PATCH BIWEEKLY, , Disp: , Rfl:     Tirzepatide 2.5 MG/0.5ML Solution Pen-injector, Inject 2.5 mg under the skin every 7 days. (Patient not taking: Reported on 2/14/2025), Disp: , Rfl:     progesterone  "(PROMETRIUM) 100 MG Cap, , Disp: , Rfl:   ALLERGIES:   Allergies   Allergen Reactions    Atorvastatin Unspecified     Stomach upset    Hydrochlorothiazide Unspecified     Urinary frequency     SURGHX:   Past Surgical History:   Procedure Laterality Date    TONSILLECTOMY       SOCHX:  reports that she has quit smoking. Her smoking use included cigarettes. She has never used smokeless tobacco. She reports current alcohol use. She reports that she does not currently use drugs.     Objective:   BP (!) 140/80 (BP Location: Right arm, Patient Position: Sitting, BP Cuff Size: Adult)   Pulse 80   Temp 37.1 °C (98.7 °F) (Temporal)   Resp 18   Ht 1.676 m (5' 6\")   Wt 59.4 kg (131 lb)   SpO2 97%   BMI 21.14 kg/m²     Physical Exam  Constitutional:       General: She is not in acute distress.     Appearance: She is well-developed. She is not diaphoretic.   HENT:      Head: Normocephalic and atraumatic.      Right Ear: Tympanic membrane, ear canal and external ear normal.      Left Ear: Tympanic membrane, ear canal and external ear normal.      Nose: Congestion present.      Mouth/Throat:      Mouth: Mucous membranes are moist.      Pharynx: Oropharynx is clear. No oropharyngeal exudate or posterior oropharyngeal erythema.   Neck:      Trachea: No tracheal deviation.   Cardiovascular:      Rate and Rhythm: Normal rate and regular rhythm.   Pulmonary:      Effort: Pulmonary effort is normal. No respiratory distress.      Breath sounds: Normal breath sounds. No wheezing or rales.   Musculoskeletal:      Cervical back: Normal range of motion and neck supple. No tenderness.   Lymphadenopathy:      Cervical: No cervical adenopathy.   Skin:     General: Skin is warm and dry.      Comments: Papular lesion in the left forearm   Neurological:      Mental Status: She is alert.       Assessment/Plan:   Assessment    1. Subacute cough  - DX-CHEST-2 VIEWS; Future  - doxycycline (VIBRAMYCIN) 100 MG Tab; Take 1 Tablet by mouth 2 times a " day.  Dispense: 14 Tablet; Refill: 0    Other orders  - QUEtiapine (SEROQUEL) 50 MG tablet    With an acute illness for the past month.  Has clear lungs on exam, but did recommend chest x-ray due to length of illness.  X-ray does not show acute infiltrate.  Reviewed supportive care measures and expected course of recovery.  Because of length of illness, reviewed the risks and benefits of empiric treatment with an antibiotic medication.  Patient agreeable and medication sent to pharmacy.  All questions answered and will follow-up in the urgent care as needed.

## 2025-02-20 ENCOUNTER — TELEPHONE (OUTPATIENT)
Dept: CARDIOLOGY | Facility: MEDICAL CENTER | Age: 55
End: 2025-02-20
Payer: OTHER GOVERNMENT

## 2025-02-20 NOTE — TELEPHONE ENCOUNTER
Spoke to patient in regards to records for NP appointment with [facility].     Patient has seen a cardiologist before?  No   Pt stated last seen Dr. Bloch in [facility].

## 2025-02-25 ENCOUNTER — OFFICE VISIT (OUTPATIENT)
Dept: CARDIOLOGY | Facility: MEDICAL CENTER | Age: 55
End: 2025-02-25
Attending: STUDENT IN AN ORGANIZED HEALTH CARE EDUCATION/TRAINING PROGRAM
Payer: OTHER GOVERNMENT

## 2025-02-25 VITALS
RESPIRATION RATE: 16 BRPM | BODY MASS INDEX: 20.89 KG/M2 | HEART RATE: 68 BPM | DIASTOLIC BLOOD PRESSURE: 82 MMHG | WEIGHT: 130 LBS | OXYGEN SATURATION: 98 % | SYSTOLIC BLOOD PRESSURE: 162 MMHG | HEIGHT: 66 IN

## 2025-02-25 DIAGNOSIS — E78.01 FAMILIAL HYPERCHOLESTEREMIA: ICD-10-CM

## 2025-02-25 DIAGNOSIS — I10 ESSENTIAL HYPERTENSION: ICD-10-CM

## 2025-02-25 DIAGNOSIS — R94.31 ABNORMAL EKG: ICD-10-CM

## 2025-02-25 DIAGNOSIS — R07.89 OTHER CHEST PAIN: ICD-10-CM

## 2025-02-25 DIAGNOSIS — R06.02 SOB (SHORTNESS OF BREATH): ICD-10-CM

## 2025-02-25 PROCEDURE — 93005 ELECTROCARDIOGRAM TRACING: CPT | Mod: TC | Performed by: INTERNAL MEDICINE

## 2025-02-25 PROCEDURE — 99213 OFFICE O/P EST LOW 20 MIN: CPT | Performed by: INTERNAL MEDICINE

## 2025-02-25 RX ORDER — EZETIMIBE 10 MG/1
10 TABLET ORAL DAILY
Qty: 90 TABLET | Refills: 3 | Status: SHIPPED | OUTPATIENT
Start: 2025-02-25

## 2025-02-25 ASSESSMENT — ENCOUNTER SYMPTOMS
ABDOMINAL PAIN: 0
SHORTNESS OF BREATH: 0
DIZZINESS: 0
DOUBLE VISION: 0
WEIGHT LOSS: 0
DEPRESSION: 0
BRUISES/BLEEDS EASILY: 0
NEUROLOGICAL NEGATIVE: 1
FOCAL WEAKNESS: 0
FEVER: 0
MYALGIAS: 0
CHILLS: 0
NERVOUS/ANXIOUS: 0
EYES NEGATIVE: 1
RESPIRATORY NEGATIVE: 1
GASTROINTESTINAL NEGATIVE: 1
VOMITING: 0
CLAUDICATION: 0
MUSCULOSKELETAL NEGATIVE: 1
CONSTITUTIONAL NEGATIVE: 1
HEADACHES: 0
CARDIOVASCULAR NEGATIVE: 1
BLURRED VISION: 0
WEAKNESS: 0
NAUSEA: 0
PSYCHIATRIC NEGATIVE: 1
COUGH: 0
PALPITATIONS: 0

## 2025-02-25 ASSESSMENT — FIBROSIS 4 INDEX: FIB4 SCORE: 0.83

## 2025-02-25 NOTE — PROGRESS NOTES
Chief Complaint   Patient presents with    New Patient    Hypertension       Subjective     Asher Galeas is a 54 y.o. female who presents today as a consult from Marsha Ryan for hypertension and hyperlipidemia.     Thank you for allowing me to evaluate Mrs. Cortez, who as you know is a 54 year old female with hypertension and hyperlipidemia, strong family history of coronary artery disease with recent myocardial infarction and stent placement her brother. She has been experiencing chest pain and shortness of breath which she attributes to anxiety and stress. She is starting to establish with a counselor at this time. She keeps active walking her dog. She retired from advertising.  Of note, she was seen by Dr. Bloch, [place] for above conditions on 12/13/23.      Past Medical History:   Diagnosis Date    Abdominal bloating 4/25/2023    Cancer (HCC)     back    Elevated alkaline phosphatase level 8/30/2022    Elevated lipoprotein(a) 6/8/2023    Head ache     Hyperlipidemia     Hypertension     Hyponatremia 8/30/2022    Migraine     Upper abdominal pain 4/25/2023     Past Surgical History:   Procedure Laterality Date    TONSILLECTOMY       Family History   Problem Relation Age of Onset    Hyperlipidemia Mother     Heart Disease Sister     Hypertension Sister     Hyperlipidemia Sister     Hyperlipidemia Brother     Heart Disease Brother     Hypertension Brother     Cancer Neg Hx     Diabetes Neg Hx     Stroke Neg Hx      Social History     Socioeconomic History    Marital status:      Spouse name: Not on file    Number of children: Not on file    Years of education: Not on file    Highest education level: Bachelor's degree (e.g., BA, AB, BS)   Occupational History    Not on file   Tobacco Use    Smoking status: Former     Types: Cigarettes    Smokeless tobacco: Never    Tobacco comments:     former light smoker; quit when brother had bypass   Vaping Use    Vaping status: Never  Used   Substance and Sexual Activity    Alcohol use: Yes     Comment: Rarely     Drug use: Not Currently     Comment: merytom-stopped 10/2021, prior daily opiate use (rx pill)-not prescribed    Sexual activity: Yes     Partners: Male   Other Topics Concern    Not on file   Social History Narrative    Not on file     Social Drivers of Health     Financial Resource Strain: Unknown (8/29/2022)    Overall Financial Resource Strain (CARDIA)     Difficulty of Paying Living Expenses: Patient declined   Food Insecurity: Unknown (8/29/2022)    Hunger Vital Sign     Worried About Running Out of Food in the Last Year: Patient declined     Ran Out of Food in the Last Year: Patient declined   Transportation Needs: No Transportation Needs (8/29/2022)    PRAPARE - Transportation     Lack of Transportation (Medical): No     Lack of Transportation (Non-Medical): No   Physical Activity: Sufficiently Active (8/29/2022)    Exercise Vital Sign     Days of Exercise per Week: 4 days     Minutes of Exercise per Session: 40 min   Stress: Stress Concern Present (8/29/2022)    [place] [employer] - Occupational Stress Questionnaire     Feeling of Stress : Rather much   Social Connections: Moderately Isolated (8/29/2022)    Social Connection and Isolation Panel [NHANES]     Frequency of Communication with Friends and Family: Never     Frequency of Social Gatherings with Friends and Family: Never     Attends Hinduism Services: Never     Active Member of Clubs or Organizations: Yes     Attends Club or Organization Meetings: Never     Marital Status:    Intimate Partner Violence: Not on file   Housing Stability: Unknown (8/29/2022)    Housing Stability Vital Sign     Unable to Pay for Housing in the Last Year: Patient refused     Number of Places Lived in the Last Year: 1     Unstable Housing in the Last Year: No     Allergies   Allergen Reactions    Atorvastatin Unspecified     Stomach upset    Hydrochlorothiazide  Unspecified     Urinary frequency     (Medications reviewed.)  Outpatient Encounter Medications as of 2/25/2025   Medication Sig Dispense Refill    QUEtiapine (SEROQUEL) 50 MG tablet       doxycycline (VIBRAMYCIN) 100 MG Tab Take 1 Tablet by mouth 2 times a day. 14 Tablet 0    rosuvastatin (CRESTOR) 40 MG tablet Take 1 Tablet by mouth every day. 90 Tablet 0    losartan (COZAAR) 100 MG Tab TAKE 1 TABLET BY MOUTH EVERY DAY 90 Tablet 3    propranolol CR (INDERAL LA) 120 MG CAPSULE SR 24 HR TAKE 1 CAPSULE BY MOUTH EVERY DAY 90 Capsule 3    butalbital/apap/caffeine (FIORICET) -40 mg Tab Take 1 Tablet by mouth every 6 hours as needed for Migraine.      Tirzepatide 2.5 MG/0.5ML Solution Pen-injector Inject 2.5 mg under the skin every 7 days. Taking every 2 to 3 weeks      Levonorgestrel (MIRENA, 52 MG, IU) by Intrauterine route.      amitriptyline (ELAVIL) 50 MG Tab Take 1 Tablet by mouth every evening. For headache prevention 90 Tablet 3    estradiol (WILFRIDO) 0.05 MG/24HR PATCH BIWEEKLY       [DISCONTINUED] progesterone (PROMETRIUM) 100 MG Cap        No facility-administered encounter medications on file as of 2/25/2025.     Review of Systems   Constitutional: Negative.  Negative for chills, fever, malaise/fatigue and weight loss.   HENT: Negative.  Negative for hearing loss.    Eyes: Negative.  Negative for blurred vision and double vision.   Respiratory: Negative.  Negative for cough and shortness of breath.    Cardiovascular: Negative.  Negative for chest pain, palpitations, claudication and leg swelling.   Gastrointestinal: Negative.  Negative for abdominal pain, nausea and vomiting.   Genitourinary: Negative.  Negative for dysuria and urgency.   Musculoskeletal: Negative.  Negative for joint pain and myalgias.   Skin: Negative.  Negative for itching and rash.   Neurological: Negative.  Negative for dizziness, focal weakness, weakness and headaches.   Endo/Heme/Allergies: Negative.  Does not bruise/bleed easily.  "  Psychiatric/Behavioral: Negative.  Negative for depression. The patient is not nervous/anxious.               Objective     BP (!) 162/82 (BP Location: Left arm, Patient Position: Sitting, BP Cuff Size: Adult)   Pulse 68   Resp 16   Ht 1.676 m (5' 6\")   Wt 59 kg (130 lb)   SpO2 98%   BMI 20.98 kg/m²     Physical Exam  Constitutional:       Appearance: Normal appearance. She is well-developed and normal weight.   HENT:      Head: Normocephalic and atraumatic.   Neck:      Vascular: No JVD.   Cardiovascular:      Rate and Rhythm: Normal rate and regular rhythm.      Heart sounds: Normal heart sounds.   Pulmonary:      Effort: Pulmonary effort is normal.      Breath sounds: Normal breath sounds.   Abdominal:      General: Bowel sounds are normal.      Palpations: Abdomen is soft.      Comments: No hepatosplenomegaly.   Musculoskeletal:         General: Normal range of motion.   Lymphadenopathy:      Cervical: No cervical adenopathy.   Skin:     General: Skin is warm and dry.   Neurological:      Mental Status: She is alert and oriented to person, place, and time.            CARDIAC STUDIES/PROCEDURES:    EKG was ordered for hypertension, performed on (02/25/25) was reviewed: EKG, personally interpreted shows sinus rhythm with RSR' of lead V1 and V2.    Laboratory results of (02/03/25) were reviewed. Cholesterol profile of 292/61/60/223 mg/dL noted.    Assessment & Plan     1. Essential hypertension  EKG      2. Familial hypercholesteremia        3. Abnormal EKG            Medical Decision Making: Today's Assessment/Status/Plan:        Hypertension: She is a 54 year old female with hypertension and hyperlipidemia. Hypertension: The blood pressure reading is high today, however, usually well controlled. She is very sensitive to medications and can not tolerate diuretics.  She is We will continue with propranolol, losartan.  Hyperlipidemia: She is doing well on statin therapy without myalgia symptoms. We will " start ezetimibe. We will perform coronary CT angiography study. We will refer her back to Dr. Bloch, [place].  Chest pain and shortness of breath abnormal EKG: She is experiencing symptoms as described and her EKG was abnormal as above. We will perform a exercise treadmill test and an echocardiogram and follow up with her.  We will perform an echocardiogram and a stress echocardiogram.    We will follow up in 3 months.    Thank you for this consult.

## 2025-02-26 LAB — EKG IMPRESSION: NORMAL

## 2025-02-26 NOTE — Clinical Note
REFERRAL APPROVAL NOTICE         Sent on February 26, 2025                   Asher Galeas  [address] [facility] [address]                   Dear Ms. Jacob Galeas,    After a careful review of the medical information and benefit coverage, [facility] has processed your referral. See below for additional details.    If applicable, you must be actively enrolled with your insurance for coverage of the authorized service. If you have any questions regarding your coverage, please contact your insurance directly.    REFERRAL INFORMATION   Referral #:  [id number]  Referred-To Department    Referred-By Provider:  N/A    Daniel Nix M.D.   Vascular Medicine      [address]  [address]  [facility] [address]  [phone] [address] [place] [address]  [phone]    Referral Start Date:  02/25/2025  Referral End Date:   02/25/2026             SCHEDULING  If you do not already have an appointment, please call [phone] to make an appointment.     MORE INFORMATION  If you do not already have a [employer] account, sign up at: [email or web address]  You can access your medical information, make appointments, see lab results, billing information, and more.  If you have questions regarding this referral, please contact  the [facility] Referrals department at:             [phone]. Monday - Friday 8:00AM - 5:00PM.     Sincerely,    [facility]

## 2025-02-27 ENCOUNTER — OFFICE VISIT (OUTPATIENT)
Dept: BEHAVIORAL HEALTH | Facility: CLINIC | Age: 55
End: 2025-02-27
Payer: OTHER GOVERNMENT

## 2025-02-27 DIAGNOSIS — Z01.89 ASSESSMENT OF EFFECTS OF PSYCHOTROPIC DRUG IN PATIENT AT RISK FOR METABOLIC SYNDROME: ICD-10-CM

## 2025-02-27 DIAGNOSIS — F41.1 GAD (GENERALIZED ANXIETY DISORDER): ICD-10-CM

## 2025-02-27 DIAGNOSIS — G47.09 OTHER INSOMNIA: ICD-10-CM

## 2025-02-27 DIAGNOSIS — Z91.89 ASSESSMENT OF EFFECTS OF PSYCHOTROPIC DRUG IN PATIENT AT RISK FOR METABOLIC SYNDROME: ICD-10-CM

## 2025-02-27 DIAGNOSIS — Z79.899 ASSESSMENT OF EFFECTS OF PSYCHOTROPIC DRUG IN PATIENT AT RISK FOR METABOLIC SYNDROME: ICD-10-CM

## 2025-02-27 RX ORDER — QUETIAPINE FUMARATE 50 MG/1
50 TABLET, FILM COATED ORAL NIGHTLY PRN
Qty: 90 TABLET | Refills: 2 | Status: SHIPPED | OUTPATIENT
Start: 2025-02-27

## 2025-02-27 NOTE — PROGRESS NOTES
PSYCHIATRY FOLLOW-UP NOTE      Name: Asher Galeas  MRN: [id number]  : 1970  Age: 54 y.o.  Date of assessment: 2025  PCP: Marsha Christianson D.O.  Persons in attendance: Patient      REASON FOR VISIT/CHIEF COMPLAINT (as stated by Patient):  Asher Galeas is a 54 y.o., White female, attending follow-up appointment for mood and anxiety management.      HISTORY OF PRESENT ILLNESS:  Asher Galeas is a 54 y.o. old female with MICHELLE and insomnia comes in today for follow up. Patient was last seen 1 month ago, and following treatment planning recommendations were done:  Add Zoloft 25 mg daily X 1 week --> 50 mg daily X 2 weeks --> 100 mg daily.  Add Trazodone 100-150-200 mg HS PRN for sleep.  Stop Kratom use  Referral placed to sleep medicine for evaluation of chronic insomnia.    History of Present Illness    She has not initiated Zoloft or trazodone.  She spoke with her sister who is doing well on Seroquel and after speaking with her she decided to use or supply of Seroquel prescribed by her primary care physician.  She has been managing her anxiety with Seroquel 50 mg, initially taking two doses but subsequently reducing it due to morning grogginess.   She has noticed improvement in anxiety with improved sleep and also have an upcoming appointment with sleep medicine in 2024.  She reports that physical activity in the morning, such as walking her dog, followed by a cup of coffee, helps alleviate the grogginess.   She continues to take kratom once daily in the morning, which she finds beneficial for her productivity. She has not increased the dosage of kratom.  Again educated on the risk of her creatinine usage in terms of withdrawal anxiety and dependence.  Patient is currently not interested in stopping this.  Extensive discussion done on the side effect profile of Seroquel especially from metabolic standpoint.  After reviewing risk and benefit patient  prefers to stay on Seroquel.  She recently consulted her cardiologist due to a family history of hypercholesterolemia and cardiac events. She is scheduled for additional testing, including a CAT scan for calcium scoring. She is uncertain whether her current pain is musculoskeletal or anxiety-related. She has discontinued her cholesterol medication temporarily. Her last lipid panel was conducted in January 2024. She has been on tirzepatide, which she reports did not significantly affect her cholesterol levels. She has achieved her goal weight of 135 pounds and wishes to continue tirzepatide indefinitely  Patient will monitor her very closely and will get baseline HbA1c and lipid panel.  Agreed with considering metformin if indicated in upcoming appointments.      CURRENT MEDICATIONS:  Current Outpatient Medications   Medication Sig Dispense Refill    ezetimibe (ZETIA) 10 MG Tab Take 1 Tablet by mouth every day. 90 Tablet 3    QUEtiapine (SEROQUEL) 50 MG tablet       doxycycline (VIBRAMYCIN) 100 MG Tab Take 1 Tablet by mouth 2 times a day. 14 Tablet 0    rosuvastatin (CRESTOR) 40 MG tablet Take 1 Tablet by mouth every day. 90 Tablet 0    losartan (COZAAR) 100 MG Tab TAKE 1 TABLET BY MOUTH EVERY DAY 90 Tablet 3    propranolol CR (INDERAL LA) 120 MG CAPSULE SR 24 HR TAKE 1 CAPSULE BY MOUTH EVERY DAY 90 Capsule 3    butalbital/apap/caffeine (FIORICET) -40 mg Tab Take 1 Tablet by mouth every 6 hours as needed for Migraine.      Tirzepatide 2.5 MG/0.5ML Solution Pen-injector Inject 2.5 mg under the skin every 7 days. Taking every 2 to 3 weeks      Levonorgestrel (MIRENA, 52 MG, IU) by Intrauterine route.      amitriptyline (ELAVIL) 50 MG Tab Take 1 Tablet by mouth every evening. For headache prevention 90 Tablet 3    estradiol (WILFRIDO) 0.05 MG/24HR PATCH BIWEEKLY        No current facility-administered medications for this visit.       MEDICAL HISTORY  Past Medical History:   Diagnosis Date    Abdominal bloating  2023    Cancer (HCC)     back    Elevated alkaline phosphatase level 2022    Elevated lipoprotein(a) 2023    Head ache     Hyperlipidemia     Hypertension     Hyponatremia 2022    Migraine     Upper abdominal pain 2023     Past Surgical History:   Procedure Laterality Date    TONSILLECTOMY         PAST PSYCHIATRIC MEDICATIONS  Paxil, Zoloft  Effexor, Cymbalta  Mirtazapine  Lamictal  Trazodone, Ambien, Lunesta, Seroquel, Amitriptyline. Clonidine  Buspar, Hydroxyzine, Xanax, Ativan, Valium     REVIEW OF SYSTEMS:        Constitutional negative   Eyes negative   Ears/Nose/Mouth/Throat negative   Cardiovascular  HTN   Respiratory negative   Gastrointestinal negative   Genitourinary negative   Muscular negative   Integumentary negative   Neurological negative   Endocrine negative   Hematologic/Lymphatic negative     PHYSICAL EXAMINAION:  Vital signs: There were no vitals taken for this visit.  Musculoskeletal: Normal gait.   Abnormal movements: none      MENTAL STATUS EXAMINATION      General:   - Grooming and hygiene: Casual,   - Apparent distress: none,   - Behavior: Calm  - Eye Contact:  Good,   - no psychomotor agitation or retardation    - Participation: Active verbal participation  Orientation: Alert and Fully Oriented to person, place and time  Mood: Euthymic  Affect: Flexible and Full range,  Thought Process: Logical and Goal-directed  Thought Content: Denies suicidal or homicidal ideations, intent or plan   Perception: Denies auditory or visual hallucinations. No delusions noted   Attention span and concentration: Intact   Speech:Rate within normal limits and Volume within normal limits  Language: Appropriate   Insight: Good  Judgment: Good  Recent and remote memory: No gross evidence of memory deficits        DEPRESSION SCREENIN/25/2023     4:00 PM 7/10/2024     1:00 PM 2025     2:00 PM   Depression Screen (PHQ-2/PHQ-9)   PHQ-2 Total Score 0 0 2   PHQ-9 Total Score   13        Interpretation of PHQ-9 Total Score   Score Severity   1-4 No Depression   5-9 Mild Depression   10-14 Moderate Depression   15-19 Moderately Severe Depression   20-27 Severe Depression    CURRENT RISK:       Suicidal: Low       Homicidal: Low       Self-Harm: Low       Relapse: Low       Crisis Safety Plan Reviewed Not Indicated       If evidence of imminent risk is present, intervention/plan:      MEDICAL RECORDS/LABS/DIAGNOSTIC TESTS REVIEWED:  No new lab since last visit     [place] -   Reviewed     (1) MICHELLE; (2) Insomnia  improving  Continue Seroquel 25-50 mg HS PRN for sleep.  Check HbA1c & follow Lipid panel.   Consider Metformin if indicated.  Stop Kratom use  Referral placed to sleep medicine for evaluation of chronic insomnia.  Medication options, alternatives (including no medications) and medication risks/benefits/side effects were discussed in detail.  Explained importance of contraceptive measures while on psychotropic medications, educated to let provider know if ever pregnant or wanting to become pregnant. Verbalized understanding.  The patient was advised to call, message provider on [employer]hart, or come in to the clinic if symptoms worsen or if any future questions/issues regarding their medications arise; the patient verbalized understanding and agreement.    The patient was educated to call 911, call the suicide hotline, or go to local ER if having thoughts of suicide or homicide; verbalized understanding.      Billing Coding based on:  33625 based on MDM    Return to clinic in 3 months or sooner if symptoms worsen.  Next Appointment: instruction provided on how to make the next appointment.     The proposed treatment plan was discussed with the patient who was provided the opportunity to ask questions and make suggestions regarding alternative treatment. Patient verbalized understanding and expressed agreement with the plan.       Shree Plata M.D.  02/27/25    This note was created  using voice recognition software (Dragon). The accuracy of the dictation is limited by the abilities of the software. I have reviewed the note prior to signing, however some errors in grammar and context are still possible. If you have any questions related to this note please do not hesitate to contact our office.

## 2025-02-28 ENCOUNTER — TELEPHONE (OUTPATIENT)
Dept: HEALTH INFORMATION MANAGEMENT | Facility: OTHER | Age: 55
End: 2025-02-28
Payer: OTHER GOVERNMENT

## 2025-03-11 ENCOUNTER — HOSPITAL ENCOUNTER (OUTPATIENT)
Dept: RADIOLOGY | Facility: MEDICAL CENTER | Age: 55
End: 2025-03-11
Attending: INTERNAL MEDICINE
Payer: COMMERCIAL

## 2025-03-11 DIAGNOSIS — E78.01 FAMILIAL HYPERCHOLESTEREMIA: ICD-10-CM

## 2025-03-11 PROCEDURE — 4410556 CT-CARDIAC SCORING (SELF PAY ONLY)

## 2025-03-12 ENCOUNTER — RESULTS FOLLOW-UP (OUTPATIENT)
Dept: CARDIOLOGY | Facility: MEDICAL CENTER | Age: 55
End: 2025-03-12
Payer: OTHER GOVERNMENT

## 2025-03-19 ENCOUNTER — HOSPITAL ENCOUNTER (OUTPATIENT)
Dept: LAB | Facility: MEDICAL CENTER | Age: 55
End: 2025-03-19
Attending: PSYCHIATRY & NEUROLOGY
Payer: OTHER GOVERNMENT

## 2025-03-19 DIAGNOSIS — Z01.89 ASSESSMENT OF EFFECTS OF PSYCHOTROPIC DRUG IN PATIENT AT RISK FOR METABOLIC SYNDROME: ICD-10-CM

## 2025-03-19 DIAGNOSIS — Z79.899 ASSESSMENT OF EFFECTS OF PSYCHOTROPIC DRUG IN PATIENT AT RISK FOR METABOLIC SYNDROME: ICD-10-CM

## 2025-03-19 DIAGNOSIS — Z91.89 ASSESSMENT OF EFFECTS OF PSYCHOTROPIC DRUG IN PATIENT AT RISK FOR METABOLIC SYNDROME: ICD-10-CM

## 2025-03-19 LAB
EST. AVERAGE GLUCOSE BLD GHB EST-MCNC: 103 MG/DL
HBA1C MFR BLD: 5.2 % (ref 4–5.6)

## 2025-03-19 PROCEDURE — 83036 HEMOGLOBIN GLYCOSYLATED A1C: CPT

## 2025-03-19 PROCEDURE — 36415 COLL VENOUS BLD VENIPUNCTURE: CPT

## 2025-03-21 DIAGNOSIS — G44.219 EPISODIC TENSION-TYPE HEADACHE, NOT INTRACTABLE: ICD-10-CM

## 2025-03-21 DIAGNOSIS — G43.001 MIGRAINE WITHOUT AURA AND WITH STATUS MIGRAINOSUS, NOT INTRACTABLE: ICD-10-CM

## 2025-03-22 RX ORDER — AMITRIPTYLINE HYDROCHLORIDE 50 MG/1
50 TABLET ORAL NIGHTLY
Qty: 90 TABLET | Refills: 1 | Status: SHIPPED | OUTPATIENT
Start: 2025-03-22

## 2025-04-09 ENCOUNTER — OFFICE VISIT (OUTPATIENT)
Dept: BEHAVIORAL HEALTH | Facility: CLINIC | Age: 55
End: 2025-04-09
Payer: OTHER GOVERNMENT

## 2025-04-09 DIAGNOSIS — F41.1 GAD (GENERALIZED ANXIETY DISORDER): ICD-10-CM

## 2025-04-09 DIAGNOSIS — G47.00 INSOMNIA, UNSPECIFIED TYPE: ICD-10-CM

## 2025-04-09 DIAGNOSIS — F39 MOOD DISORDER (HCC): ICD-10-CM

## 2025-04-09 PROCEDURE — 99214 OFFICE O/P EST MOD 30 MIN: CPT | Performed by: PSYCHIATRY & NEUROLOGY

## 2025-04-09 NOTE — PROGRESS NOTES
PSYCHIATRY FOLLOW-UP NOTE      Name: Asher Galeas  MRN: [id number]  : 1970  Age: 54 y.o.  Date of assessment: 2025  PCP: Marsha Christianson D.O.  Persons in attendance: Patient      REASON FOR VISIT/CHIEF COMPLAINT (as stated by Patient):  Asher Galeas is a 54 y.o., White female, attending follow-up appointment for mood and anxiety management.      HISTORY OF PRESENT ILLNESS:  Asher Galeas is a 54 y.o. old female with MICHELLE and insomnia comes in today for follow up. Patient was last seen 6 weeks ago, and following treatment planning recommendations were done:  Continue Seroquel 25-50 mg HS PRN for sleep.  Check HbA1c & follow Lipid panel.   Consider Metformin if indicated.  Stop Kratom use  Referral placed to sleep medicine for evaluation of chronic insomnia.    History of Present Illness    She reports a persistent increase in appetite, which she attributes to Seroquel. This has resulted in a weight gain of 4 pounds. She has been on Seroquel since the end of 2025 and   She has been practicing intermittent fasting.  She also reports a subdued mood and lack of motivation, which she believes may be side effects of the Seroquel as well. She has been experiencing anhedonia, a lack of pleasure in activities she previously enjoyed.  She attempted to reduce her Seroquel dosage from 50 mg to 25 mg last night, which resulted in improved wakefulness today. However, she notes that her sleep quality was not as good as when she was on the higher dose, but it was still acceptable.  Again discussed the metabolic side effects and sedation side effect of Seroquel.  Discussed her cardiac history and recent plaques found on CT cardiac scoring.  She had a family history of cardiac diseases with hypercholesterolemia requiring stents and many family members.  Emphasized how risk is higher compared to benefit with Seroquel.  Agreed to plan of considering  trazodone.  She has trazodone at home from a previous prescription, although she does not recall the dosage.   Patient will monitor for changes in mood and anxiety without Seroquel and will consider adding Zoloft in upcoming session if indicated      CURRENT MEDICATIONS:  Current Outpatient Medications   Medication Sig Dispense Refill    amitriptyline (ELAVIL) 50 MG Tab TAKE 1 TABLET BY MOUTH EVERY EVENING FOR HEADACHE PREVENTION 90 Tablet 1    QUEtiapine (SEROQUEL) 50 MG tablet Take 1 Tablet by mouth at bedtime as needed (Sleep). 90 Tablet 2    ezetimibe (ZETIA) 10 MG Tab Take 1 Tablet by mouth every day. 90 Tablet 3    doxycycline (VIBRAMYCIN) 100 MG Tab Take 1 Tablet by mouth 2 times a day. 14 Tablet 0    rosuvastatin (CRESTOR) 40 MG tablet Take 1 Tablet by mouth every day. 90 Tablet 0    losartan (COZAAR) 100 MG Tab TAKE 1 TABLET BY MOUTH EVERY DAY 90 Tablet 3    propranolol CR (INDERAL LA) 120 MG CAPSULE SR 24 HR TAKE 1 CAPSULE BY MOUTH EVERY DAY 90 Capsule 3    butalbital/apap/caffeine (FIORICET) -40 mg Tab Take 1 Tablet by mouth every 6 hours as needed for Migraine.      Tirzepatide 2.5 MG/0.5ML Solution Pen-injector Inject 2.5 mg under the skin every 7 days. Taking every 2 to 3 weeks      Levonorgestrel (MIRENA, 52 MG, IU) by Intrauterine route.      estradiol (WILFRIDO) 0.05 MG/24HR PATCH BIWEEKLY        No current facility-administered medications for this visit.       MEDICAL HISTORY  Past Medical History:   Diagnosis Date    Abdominal bloating 4/25/2023    Cancer (HCC)     back    Elevated alkaline phosphatase level 8/30/2022    Elevated lipoprotein(a) 6/8/2023    Head ache     Hyperlipidemia     Hypertension     Hyponatremia 8/30/2022    Migraine     Upper abdominal pain 4/25/2023     Past Surgical History:   Procedure Laterality Date    TONSILLECTOMY           PAST PSYCHIATRIC MEDICATIONS  Paxil, Zoloft  Effexor, Cymbalta  Mirtazapine  Lamictal  Trazodone, Ambien, Lunesta, Seroquel, Amitriptyline.  Clonidine  Buspar, Hydroxyzine, Xanax, Ativan, Valium      REVIEW OF SYSTEMS:        Constitutional negative   Eyes negative   Ears/Nose/Mouth/Throat negative   Cardiovascular  HTN   Respiratory negative   Gastrointestinal negative   Genitourinary negative   Muscular negative   Integumentary negative   Neurological negative   Endocrine negative   Hematologic/Lymphatic negative     PHYSICAL EXAMINAION:  Vital signs: There were no vitals taken for this visit.  Musculoskeletal: Normal gait.   Abnormal movements: none      MENTAL STATUS EXAMINATION      General:   - Grooming and hygiene: Casual,   - Apparent distress: none,   - Behavior: Calm  - Eye Contact:  Good,   - no psychomotor agitation or retardation    - Participation: Active verbal participation  Orientation: Alert and Fully Oriented to person, place and time  Mood: Euthymic  Affect: Flexible and Full range,  Thought Process: Logical and Goal-directed  Thought Content: Denies suicidal or homicidal ideations, intent or plan   Perception: Denies auditory or visual hallucinations. No delusions noted   Attention span and concentration: Intact   Speech:Rate within normal limits and Volume within normal limits  Language: Appropriate   Insight: Good  Judgment: Good  Recent and remote memory: No gross evidence of memory deficits        DEPRESSION SCREENIN/25/2023     4:00 PM 7/10/2024     1:00 PM 2025     2:00 PM   Depression Screen (PHQ-2/PHQ-9)   PHQ-2 Total Score 0 0 2   PHQ-9 Total Score   13       Interpretation of PHQ-9 Total Score   Score Severity   1-4 No Depression   5-9 Mild Depression   10-14 Moderate Depression   15-19 Moderately Severe Depression   20-27 Severe Depression    CURRENT RISK:       Suicidal: Low       Homicidal: Low       Self-Harm: Low       Relapse: Low       Crisis Safety Plan Reviewed Not Indicated       If evidence of imminent risk is present, intervention/plan:      MEDICAL RECORDS/LABS/DIAGNOSTIC TESTS REVIEWED:  No new  lab since last visit     [place] [facility] records -   Reviewed     (1) MICHELLE; (2) Insomnia  Increase in weight and sedation with Seroquel  Stop Seroquel   Add Trazodone  mg HS PRN for sleep.  Consider Zoloft if indicated for mood or anxiety.  Stop Kratom use  Referral placed to sleep medicine for evaluation of chronic insomnia.  Medication options, alternatives (including no medications) and medication risks/benefits/side effects were discussed in detail.  Explained importance of contraceptive measures while on psychotropic medications, educated to let provider know if ever pregnant or wanting to become pregnant. Verbalized understanding.  The patient was advised to call, message provider on [employer], or come in to the clinic if symptoms worsen or if any future questions/issues regarding their medications arise; the patient verbalized understanding and agreement.    The patient was educated to call 911, call the suicide hotline, or go to local ER if having thoughts of suicide or homicide; verbalized understanding.      Billing Coding based on:  38037 based on MDM    Return to clinic in 1 month or sooner if symptoms worsen.  Next Appointment: instruction provided on how to make the next appointment.     The proposed treatment plan was discussed with the patient who was provided the opportunity to ask questions and make suggestions regarding alternative treatment. Patient verbalized understanding and expressed agreement with the plan.       Shree Plata M.D.  04/09/25    This note was created using voice recognition software (Dragon). The accuracy of the dictation is limited by the abilities of the software. I have reviewed the note prior to signing, however some errors in grammar and context are still possible. If you have any questions related to this note please do not hesitate to contact our office.

## 2025-04-16 DIAGNOSIS — Z01.89 ASSESSMENT OF EFFECTS OF PSYCHOTROPIC DRUG IN PATIENT AT RISK FOR METABOLIC SYNDROME: ICD-10-CM

## 2025-04-16 DIAGNOSIS — Z91.89 ASSESSMENT OF EFFECTS OF PSYCHOTROPIC DRUG IN PATIENT AT RISK FOR METABOLIC SYNDROME: ICD-10-CM

## 2025-04-16 DIAGNOSIS — Z79.899 ASSESSMENT OF EFFECTS OF PSYCHOTROPIC DRUG IN PATIENT AT RISK FOR METABOLIC SYNDROME: ICD-10-CM

## 2025-04-16 RX ORDER — METFORMIN HYDROCHLORIDE 500 MG/1
500 TABLET, EXTENDED RELEASE ORAL DAILY
Qty: 100 TABLET | Refills: 3 | Status: SHIPPED | OUTPATIENT
Start: 2025-04-16 | End: 2026-05-21

## 2025-05-13 ENCOUNTER — OFFICE VISIT (OUTPATIENT)
Dept: BEHAVIORAL HEALTH | Facility: CLINIC | Age: 55
End: 2025-05-13
Payer: OTHER GOVERNMENT

## 2025-05-13 DIAGNOSIS — F39 MOOD DISORDER (HCC): ICD-10-CM

## 2025-05-13 DIAGNOSIS — G47.00 INSOMNIA, UNSPECIFIED TYPE: ICD-10-CM

## 2025-05-13 DIAGNOSIS — F41.1 GAD (GENERALIZED ANXIETY DISORDER): ICD-10-CM

## 2025-05-13 PROCEDURE — 99214 OFFICE O/P EST MOD 30 MIN: CPT | Performed by: PSYCHIATRY & NEUROLOGY

## 2025-05-13 RX ORDER — QUETIAPINE FUMARATE 25 MG/1
TABLET, FILM COATED ORAL
Qty: 180 TABLET | Refills: 1 | Status: SHIPPED | OUTPATIENT
Start: 2025-05-13

## 2025-05-13 NOTE — PROGRESS NOTES
PSYCHIATRY FOLLOW-UP NOTE      Name: Asher Galeas  MRN: [id number]  : 1970  Age: 54 y.o.  Date of assessment: 2025  PCP: Marsha Christianson D.O.  Persons in attendance: Patient      REASON FOR VISIT/CHIEF COMPLAINT (as stated by Patient):  Asher Galeas is a 54 y.o., White female, attending follow-up appointment for mood and anxiety management.      HISTORY OF PRESENT ILLNESS:  Asher Galeas is a 54 y.o. old female with MICHELLE and insomnia comes in today for follow up. Patient was last seen 1 month ago, and following treatment planning recommendations were done:  Stop Seroquel   Add Trazodone  mg HS PRN for sleep.  Consider Zoloft if indicated for mood or anxiety.  Stop Kratom use  Referral placed to sleep medicine for evaluation of chronic insomnia.    History of Present Illness  Patient contacted me on mychart :   I gave the Trazadone a go, but unfortunately I had several unpleasant side effects that started with severe congestion & then vertigo came on. The following morning, I woke up with the worst jaw pain (took 1 & a half 100mg pills as stated per directions) which ended up in headache. Please advise if Metformin + Seroquel is a way to move forward.  Agreed with restarting Seroquel and Adding Metformin to prevent metabolic side effects.       She has been on metformin for less than a month, initially taking it in the evening but experiencing gastrointestinal discomfort approximately 5 hours post-administration. She subsequently switched to morning dosing, which has significantly reduced side effects.  Her current weight is 129.2 pounds, up from 126 pounds in 2025.  She has resumed Seroquel, which allows her to achieve up to 9 hours of sleep, although she finds it difficult to awaken from. She is currently on a 25 mg dose of Seroquel.   She is also on amitriptyline for headaches, which she takes at bedtime.   She reports that her sleep  quality improves when her  is away on business trips, but she requires a full dose of Seroquel upon his return.  She is seeking a therapist to discuss relationship issues and potential neurodiversity that may be impacting her behavior in relationships.   Agreed to plan of getting 25 mg Seroquel prescription and she will work on taking 12.5 and assessing if sleep improves and keeping on the lowest effective dose of Seroquel.  Agreed with gradual titration of metformin if indicated in the upcoming sessions after 2 months.  Patient continues to engage in intermittent fasting.  She continues to use kratom on a daily basis and currently not interested in cutting down or stopping.      CURRENT MEDICATIONS:  Current Outpatient Medications   Medication Sig Dispense Refill    metFORMIN ER (GLUCOPHAGE XR) 500 MG TABLET SR 24 HR Take 1 Tablet by mouth every day. 100 Tablet 3    amitriptyline (ELAVIL) 50 MG Tab TAKE 1 TABLET BY MOUTH EVERY EVENING FOR HEADACHE PREVENTION 90 Tablet 1    ezetimibe (ZETIA) 10 MG Tab Take 1 Tablet by mouth every day. 90 Tablet 3    doxycycline (VIBRAMYCIN) 100 MG Tab Take 1 Tablet by mouth 2 times a day. 14 Tablet 0    rosuvastatin (CRESTOR) 40 MG tablet Take 1 Tablet by mouth every day. 90 Tablet 0    losartan (COZAAR) 100 MG Tab TAKE 1 TABLET BY MOUTH EVERY DAY 90 Tablet 3    propranolol CR (INDERAL LA) 120 MG CAPSULE SR 24 HR TAKE 1 CAPSULE BY MOUTH EVERY DAY 90 Capsule 3    butalbital/apap/caffeine (FIORICET) -40 mg Tab Take 1 Tablet by mouth every 6 hours as needed for Migraine.      Tirzepatide 2.5 MG/0.5ML Solution Pen-injector Inject 2.5 mg under the skin every 7 days. Taking every 2 to 3 weeks      Levonorgestrel (MIRENA, 52 MG, IU) by Intrauterine route.      estradiol (WILFRIDO) 0.05 MG/24HR PATCH BIWEEKLY        No current facility-administered medications for this visit.       MEDICAL HISTORY  Past Medical History:   Diagnosis Date    Abdominal bloating 4/25/2023    Cancer  (HCC)     back    Elevated alkaline phosphatase level 2022    Elevated lipoprotein(a) 2023    Head ache     Hyperlipidemia     Hypertension     Hyponatremia 2022    Migraine     Upper abdominal pain 2023     Past Surgical History:   Procedure Laterality Date    TONSILLECTOMY           PAST PSYCHIATRIC MEDICATIONS  Paxil, Zoloft  Effexor, Cymbalta  Mirtazapine  Lamictal  Trazodone, Ambien, Lunesta, Seroquel, Amitriptyline. Clonidine  Buspar, Hydroxyzine, Xanax, Ativan, Valium      REVIEW OF SYSTEMS:        Constitutional negative   Eyes negative   Ears/Nose/Mouth/Throat negative   Cardiovascular  HTN   Respiratory negative   Gastrointestinal negative   Genitourinary negative   Muscular negative   Integumentary negative   Neurological negative   Endocrine negative   Hematologic/Lymphatic negative     PHYSICAL EXAMINAION:  Vital signs: There were no vitals taken for this visit.  Musculoskeletal: Normal gait.   Abnormal movements: none      MENTAL STATUS EXAMINATION      General:   - Grooming and hygiene: Casual,   - Apparent distress: none,   - Behavior: Calm  - Eye Contact:  Good,   - no psychomotor agitation or retardation    - Participation: Active verbal participation  Orientation: Alert and Fully Oriented to person, place and time  Mood: Euthymic  Affect: Flexible and Full range,  Thought Process: Logical and Goal-directed  Thought Content: Denies suicidal or homicidal ideations, intent or plan   Perception: Denies auditory or visual hallucinations. No delusions noted   Attention span and concentration: Intact   Speech:Rate within normal limits and Volume within normal limits  Language: Appropriate   Insight: Good  Judgment: Good  Recent and remote memory: No gross evidence of memory deficits        DEPRESSION SCREENIN/25/2023     4:00 PM 7/10/2024     1:00 PM 2025     2:00 PM   Depression Screen (PHQ-2/PHQ-9)   PHQ-2 Total Score 0 0 2   PHQ-9 Total Score   13       Interpretation  of PHQ-9 Total Score   Score Severity   1-4 No Depression   5-9 Mild Depression   10-14 Moderate Depression   15-19 Moderately Severe Depression   20-27 Severe Depression    CURRENT RISK:       Suicidal: Low       Homicidal: Low       Self-Harm: Low       Relapse: Low       Crisis Safety Plan Reviewed Not Indicated       If evidence of imminent risk is present, intervention/plan:      MEDICAL RECORDS/LABS/DIAGNOSTIC TESTS REVIEWED:  No new lab since last visit     [place]  records -   Reviewed     (1) MICHELLE; (2) Insomnia  improving  Continue Seroquel 12.5-25 mg HS PRN for sleep.  Continue Metformin  mg daily  Stop Kratom use  Referral placed to sleep medicine for evaluation of chronic insomnia.  Medication options, alternatives (including no medications) and medication risks/benefits/side effects were discussed in detail.  Explained importance of contraceptive measures while on psychotropic medications, educated to let provider know if ever pregnant or wanting to become pregnant. Verbalized understanding.  The patient was advised to call, message provider on [facility], or come in to the clinic if symptoms worsen or if any future questions/issues regarding their medications arise; the patient verbalized understanding and agreement.    The patient was educated to call 911, call the suicide hotline, or go to local ER if having thoughts of suicide or homicide; verbalized understanding.      Billing Coding based on:  12030 based on MDM    Return to clinic in 2 months or sooner if symptoms worsen.  Next Appointment: instruction provided on how to make the next appointment.     The proposed treatment plan was discussed with the patient who was provided the opportunity to ask questions and make suggestions regarding alternative treatment. Patient verbalized understanding and expressed agreement with the plan.       Shree Plata M.D.  05/13/25    This note was created using voice recognition software (Dragon). The  accuracy of the dictation is limited by the abilities of the software. I have reviewed the note prior to signing, however some errors in grammar and context are still possible. If you have any questions related to this note please do not hesitate to contact our office.

## 2025-05-15 ENCOUNTER — HOSPITAL ENCOUNTER (OUTPATIENT)
Dept: CARDIOLOGY | Facility: MEDICAL CENTER | Age: 55
End: 2025-05-15
Attending: INTERNAL MEDICINE
Payer: OTHER GOVERNMENT

## 2025-05-15 DIAGNOSIS — E78.01 FAMILIAL HYPERCHOLESTEREMIA: ICD-10-CM

## 2025-05-15 DIAGNOSIS — R06.02 SOB (SHORTNESS OF BREATH): ICD-10-CM

## 2025-05-15 DIAGNOSIS — R07.89 OTHER CHEST PAIN: ICD-10-CM

## 2025-05-15 DIAGNOSIS — R94.31 ABNORMAL EKG: ICD-10-CM

## 2025-05-15 PROCEDURE — 93017 CV STRESS TEST TRACING ONLY: CPT | Mod: TC

## 2025-05-16 ENCOUNTER — RESULTS FOLLOW-UP (OUTPATIENT)
Dept: CARDIOLOGY | Facility: MEDICAL CENTER | Age: 55
End: 2025-05-16

## 2025-05-16 LAB — LV EJECT FRACT  99904: 65

## 2025-05-16 PROCEDURE — 93351 STRESS TTE COMPLETE: CPT | Mod: 26 | Performed by: INTERNAL MEDICINE

## 2025-05-29 ENCOUNTER — OFFICE VISIT (OUTPATIENT)
Dept: MEDICAL GROUP | Facility: LAB | Age: 55
End: 2025-05-29
Payer: OTHER GOVERNMENT

## 2025-05-29 ENCOUNTER — HOSPITAL ENCOUNTER (OUTPATIENT)
Facility: MEDICAL CENTER | Age: 55
End: 2025-05-29
Attending: STUDENT IN AN ORGANIZED HEALTH CARE EDUCATION/TRAINING PROGRAM
Payer: OTHER GOVERNMENT

## 2025-05-29 VITALS
OXYGEN SATURATION: 98 % | HEART RATE: 78 BPM | RESPIRATION RATE: 16 BRPM | BODY MASS INDEX: 21.05 KG/M2 | HEIGHT: 66 IN | DIASTOLIC BLOOD PRESSURE: 88 MMHG | WEIGHT: 131 LBS | SYSTOLIC BLOOD PRESSURE: 142 MMHG | TEMPERATURE: 97.7 F

## 2025-05-29 DIAGNOSIS — E78.00 HYPERCHOLESTEROLEMIA WITH LDL GREATER THAN 190 MG/DL: ICD-10-CM

## 2025-05-29 DIAGNOSIS — Z79.899 HIGH RISK MEDICATION USE: ICD-10-CM

## 2025-05-29 DIAGNOSIS — R07.9 CHEST PAIN, UNSPECIFIED TYPE: ICD-10-CM

## 2025-05-29 DIAGNOSIS — R04.2 BLOOD IN SPUTUM: ICD-10-CM

## 2025-05-29 DIAGNOSIS — E78.01 FAMILIAL HYPERCHOLESTEREMIA: ICD-10-CM

## 2025-05-29 DIAGNOSIS — Z12.31 ENCOUNTER FOR SCREENING MAMMOGRAM FOR BREAST CANCER: ICD-10-CM

## 2025-05-29 DIAGNOSIS — I10 ESSENTIAL HYPERTENSION: ICD-10-CM

## 2025-05-29 DIAGNOSIS — R06.02 SHORTNESS OF BREATH: ICD-10-CM

## 2025-05-29 PROCEDURE — 80307 DRUG TEST PRSMV CHEM ANLYZR: CPT

## 2025-05-29 RX ORDER — ROSUVASTATIN CALCIUM 40 MG/1
40 TABLET, COATED ORAL DAILY
Qty: 90 TABLET | Refills: 1 | Status: SHIPPED | OUTPATIENT
Start: 2025-05-29

## 2025-05-29 RX ORDER — ZOLPIDEM TARTRATE 5 MG/1
5 TABLET ORAL NIGHTLY PRN
COMMUNITY
Start: 2023-11-22

## 2025-05-29 ASSESSMENT — FIBROSIS 4 INDEX: FIB4 SCORE: 0.83

## 2025-05-29 NOTE — PATIENT INSTRUCTIONS
Ideal blood pressure <130/80.  If <100 for the top number we may be over treating.  Severe range >180/120, with symptoms=ER  Bring in machine to cross check.

## 2025-05-29 NOTE — PROGRESS NOTES
Verbal consent was acquired by the patient to use [employer] ambient listening note generation during this visit Yes.    Subjective:     Chief Complaint   Patient presents with    Medication Management    Blood in Sputum     2 X a month      HPI:     History of Present Illness  The patient is a 54-year-old female who presents for evaluation of blood in sputum and medication management.    She reports expectorating phlegm with scant blood on one occasion, which she has been experiencing for the past 6 months. The blood is typically present in the morning and is described as a small dot. She has a history of annual bronchitis episodes in her 20s, during her smoking period, but has been abstinent from smoking since her 30s. She does not experience any coughing or wheezing but reports a sensation of phlegm accumulation before bedtime and upon waking. She also experiences dry mouth.    She experiences occasional chest pain and shortness of breath, even at rest, which she initially attributed to anxiety. The chest pain is brief, lasting only a few seconds, and can occur up to 3 times within a 5-minute period. She has previously undergone a CT scan for cardiac calcium scoring and had a normal stress test. She has been on estrogen therapy for 2 years and continues to use the patch.    She is currently on Seroquel 25-50 mg for anxiety, which she finds effective but notes it increases her appetite. She has gained 4 pounds since 02/2025. Only uses Ambien when traveling. She is on metformin for blood sugar control prescribed by her psychiatrist due to use of seroquel, which she takes in the morning with food. She experiences mild cramps at night, lasting about 5 minutes, but does not have heartburn or vomiting. She discontinued GLP-1 in 02/2025.    She is also on amitriptyline for migraines and takes Fiorinal as needed for severe headaches.    She is on rosuvastatin for hyperlipidemia, not taking zetia. Prefers to avoid  "medication.     She does not monitor her blood pressure at home due to stress.     Review of Systems   Constitutional:  Negative for chills, fever, malaise/fatigue and weight loss.   Respiratory:  Positive for cough, hemoptysis and shortness of breath. Negative for wheezing.    Cardiovascular:  Positive for chest pain.   Gastrointestinal:  Positive for constipation. Negative for abdominal pain, diarrhea, nausea and vomiting.   Skin:  Negative for rash.       Objective:     Exam:  BP (!) 142/88 (BP Location: Left arm, Patient Position: Sitting, BP Cuff Size: Adult)   Pulse 78   Temp 36.5 °C (97.7 °F)   Resp 16   Ht 1.676 m (5' 6\")   Wt 59.4 kg (131 lb)   SpO2 98%   BMI 21.14 kg/m²  Body mass index is 21.14 kg/m².    Physical Exam  Vitals reviewed.   Constitutional:       General: She is not in acute distress.     Appearance: Normal appearance. She is not ill-appearing.   HENT:      Head: Normocephalic and atraumatic.      Nose: Nose normal.      Mouth/Throat:      Mouth: Mucous membranes are moist.      Pharynx: No oropharyngeal exudate or posterior oropharyngeal erythema.   Eyes:      General: No scleral icterus.     Conjunctiva/sclera: Conjunctivae normal.   Cardiovascular:      Rate and Rhythm: Normal rate and regular rhythm.      Heart sounds: Normal heart sounds.   Pulmonary:      Effort: Pulmonary effort is normal. No respiratory distress.      Breath sounds: Normal breath sounds. No wheezing, rhonchi or rales.   Abdominal:      General: Abdomen is flat. Bowel sounds are normal. There is no distension.      Palpations: Abdomen is soft. There is no mass.      Tenderness: There is no abdominal tenderness. There is no guarding or rebound.   Musculoskeletal:      Cervical back: Neck supple. No rigidity or tenderness.      Right lower leg: No edema.      Left lower leg: No edema.   Lymphadenopathy:      Cervical: No cervical adenopathy.   Skin:     General: Skin is warm and dry.      Coloration: Skin is not " jaundiced or pale.   Neurological:      General: No focal deficit present.      Mental Status: She is alert and oriented to person, place, and time.   Psychiatric:         Mood and Affect: Mood normal.         Behavior: Behavior normal.         Thought Content: Thought content normal.       Reviewed most recent/relevant labs/imaging.    Assessment & Plan:     54 y.o. female with the following -     1. Blood in sputum  Chronic, minimal.  Potentially related to dry mouth as this typically occurs in the morning  -Patient did have a CT cardiac calcium score in March while she was symptomatic and there were no lung findings however we discussed that this is not adequate to rule out PE.  - She elects to check D-dimer first and if negative would not need to proceed with CTA of the chest.  - She is advised to consider using a humidifier to alleviate her phlegm symptoms. If the D-dimer test results are elevated, a CT scan will be scheduled.  - CBC WITH DIFFERENTIAL; Future  - Prothrombin Time; Future  - CT-CTA CHEST PULMONARY ARTERY W/ RECONS; Future  - D-DIMER; Future    2. Shortness of breath  Chronic, does not ever occur with exercise.  Potentially related to anxiety as previous CT of the chest in March showed clear lungs.  - A pulmonary function test will be performed to investigate further.  - She is advised to monitor for any worsening symptoms and report them immediately.  - PULMONARY FUNCTION TESTS -Test requested: Complete Pulmonary Function Test; Include MIPS/MEPS? No; Future    3. Chest pain, unspecified type  Chronic, brief and intermittent.  Patient has had extensive evaluation with her cardiologist including CT cardiac calcium score and a stress test.  Has an echocardiogram scheduled.    4. Familial hypercholesteremia  5. Hypercholesterolemia with LDL greater than 190 mg/dL  Chronic, uncontrolled.  Followed by vascular medicine with upcoming appointment next week and labs have not been repeated on rosuvastatin  40 mg nightly.  Self discontinued Zetia, prefers to avoid medication if possible.  - Labs ordered to be completed prior to vascular medicine appointment, plan pending results.  - Comp Metabolic Panel; Future  - TSH WITH REFLEX TO FT4; Future  - Lipid Profile; Future  - Lipoprotein (a); Future  - rosuvastatin (CRESTOR) 40 MG tablet; Take 1 Tablet by mouth every day.  Dispense: 90 Tablet; Refill: 1    6. Essential hypertension  Chronic, patient has concerns for whitecoat hypertension.  Improved significantly on repeat but still above goal.  Continues with propranolol which is primarily used to prevent migraine and on the maximum dose of losartan 100 mg daily.  Patient to monitor at home, blood pressure to be rechecked at vascular medicine appointment next week.    7. High risk medication use  - She is taking amitriptyline and propranolol for migraine prevention.  - Reports that Fiorinal is effective for severe headaches, requests refills. Using Kratom daily.  - A drug screen will be repeated due to previous abnormal results.  - Fiorinal refill will be considered upon review of the drug screen results.  - PAIN MANAGEMENT SCRN, W/ RFLX TO QNT; Future  - Controlled Substance Treatment Agreement    8. Encounter for screening mammogram for breast cancer  - MA-SCREENING MAMMO BILAT W/TOMOSYNTHESIS W/CAD; Future    I spent a total of 30 minutes with record review, exam, communication with the patient, and documentation of this encounter.    Return if symptoms worsen or fail to improve.    Please note that this dictation was created using voice recognition software. I have made every reasonable attempt to correct obvious errors, but I expect that there are errors of grammar and possibly content that I did not discover before finalizing the note.

## 2025-05-30 ENCOUNTER — HOSPITAL ENCOUNTER (OUTPATIENT)
Dept: LAB | Facility: MEDICAL CENTER | Age: 55
End: 2025-05-30
Attending: STUDENT IN AN ORGANIZED HEALTH CARE EDUCATION/TRAINING PROGRAM
Payer: OTHER GOVERNMENT

## 2025-05-30 ENCOUNTER — RESULTS FOLLOW-UP (OUTPATIENT)
Dept: MEDICAL GROUP | Facility: LAB | Age: 55
End: 2025-05-30

## 2025-05-30 DIAGNOSIS — E78.01 FAMILIAL HYPERCHOLESTEREMIA: ICD-10-CM

## 2025-05-30 DIAGNOSIS — R07.9 CHEST PAIN, UNSPECIFIED TYPE: ICD-10-CM

## 2025-05-30 DIAGNOSIS — R06.02 SHORTNESS OF BREATH: ICD-10-CM

## 2025-05-30 DIAGNOSIS — R04.2 BLOOD IN SPUTUM: ICD-10-CM

## 2025-05-30 LAB
ALBUMIN SERPL BCP-MCNC: 4.3 G/DL (ref 3.2–4.9)
ALBUMIN/GLOB SERPL: 1.7 G/DL
ALP SERPL-CCNC: 58 U/L (ref 30–99)
ALT SERPL-CCNC: 8 U/L (ref 2–50)
ANION GAP SERPL CALC-SCNC: 11 MMOL/L (ref 7–16)
AST SERPL-CCNC: 13 U/L (ref 12–45)
BASOPHILS # BLD AUTO: 0.7 % (ref 0–1.8)
BASOPHILS # BLD: 0.04 K/UL (ref 0–0.12)
BILIRUB SERPL-MCNC: 0.5 MG/DL (ref 0.1–1.5)
BUN SERPL-MCNC: 17 MG/DL (ref 8–22)
CALCIUM ALBUM COR SERPL-MCNC: 8.9 MG/DL (ref 8.5–10.5)
CALCIUM SERPL-MCNC: 9.1 MG/DL (ref 8.5–10.5)
CHLORIDE SERPL-SCNC: 106 MMOL/L (ref 96–112)
CHOLEST SERPL-MCNC: 251 MG/DL (ref 100–199)
CO2 SERPL-SCNC: 22 MMOL/L (ref 20–33)
CREAT SERPL-MCNC: 0.88 MG/DL (ref 0.5–1.4)
D DIMER PPP IA.FEU-MCNC: <0.27 UG/ML (FEU) (ref 0–0.5)
EOSINOPHIL # BLD AUTO: 0.33 K/UL (ref 0–0.51)
EOSINOPHIL NFR BLD: 6.2 % (ref 0–6.9)
ERYTHROCYTE [DISTWIDTH] IN BLOOD BY AUTOMATED COUNT: 43.7 FL (ref 35.9–50)
GFR SERPLBLD CREATININE-BSD FMLA CKD-EPI: 77 ML/MIN/1.73 M 2
GLOBULIN SER CALC-MCNC: 2.5 G/DL (ref 1.9–3.5)
GLUCOSE SERPL-MCNC: 87 MG/DL (ref 65–99)
HCT VFR BLD AUTO: 38.6 % (ref 37–47)
HDLC SERPL-MCNC: 61 MG/DL
HGB BLD-MCNC: 12.6 G/DL (ref 12–16)
IMM GRANULOCYTES # BLD AUTO: 0.01 K/UL (ref 0–0.11)
IMM GRANULOCYTES NFR BLD AUTO: 0.2 % (ref 0–0.9)
INR PPP: 0.97 (ref 0.87–1.13)
LDLC SERPL CALC-MCNC: 176 MG/DL
LYMPHOCYTES # BLD AUTO: 2.74 K/UL (ref 1–4.8)
LYMPHOCYTES NFR BLD: 51.3 % (ref 22–41)
MCH RBC QN AUTO: 30.5 PG (ref 27–33)
MCHC RBC AUTO-ENTMCNC: 32.6 G/DL (ref 32.2–35.5)
MCV RBC AUTO: 93.5 FL (ref 81.4–97.8)
MONOCYTES # BLD AUTO: 0.4 K/UL (ref 0–0.85)
MONOCYTES NFR BLD AUTO: 7.5 % (ref 0–13.4)
NEUTROPHILS # BLD AUTO: 1.82 K/UL (ref 1.82–7.42)
NEUTROPHILS NFR BLD: 34.1 % (ref 44–72)
NRBC # BLD AUTO: 0 K/UL
NRBC BLD-RTO: 0 /100 WBC (ref 0–0.2)
PLATELET # BLD AUTO: 253 K/UL (ref 164–446)
PMV BLD AUTO: 11 FL (ref 9–12.9)
POTASSIUM SERPL-SCNC: 3.9 MMOL/L (ref 3.6–5.5)
PROT SERPL-MCNC: 6.8 G/DL (ref 6–8.2)
PROTHROMBIN TIME: 12.9 SEC (ref 12–14.6)
RBC # BLD AUTO: 4.13 M/UL (ref 4.2–5.4)
SODIUM SERPL-SCNC: 139 MMOL/L (ref 135–145)
TRIGL SERPL-MCNC: 70 MG/DL (ref 0–149)
TSH SERPL DL<=0.005 MIU/L-ACNC: 3.83 UIU/ML (ref 0.38–5.33)
WBC # BLD AUTO: 5.3 K/UL (ref 4.8–10.8)

## 2025-05-30 PROCEDURE — 80053 COMPREHEN METABOLIC PANEL: CPT

## 2025-05-30 PROCEDURE — 85379 FIBRIN DEGRADATION QUANT: CPT

## 2025-05-30 PROCEDURE — 36415 COLL VENOUS BLD VENIPUNCTURE: CPT

## 2025-05-30 PROCEDURE — 80061 LIPID PANEL: CPT

## 2025-05-30 PROCEDURE — 85610 PROTHROMBIN TIME: CPT

## 2025-05-30 PROCEDURE — 85025 COMPLETE CBC W/AUTO DIFF WBC: CPT

## 2025-05-30 PROCEDURE — 83695 ASSAY OF LIPOPROTEIN(A): CPT

## 2025-05-30 PROCEDURE — 84443 ASSAY THYROID STIM HORMONE: CPT

## 2025-05-30 ASSESSMENT — ENCOUNTER SYMPTOMS
SHORTNESS OF BREATH: 1
ABDOMINAL PAIN: 0
VOMITING: 0
DIARRHEA: 0
WHEEZING: 0
CHILLS: 0
WEIGHT LOSS: 0
NAUSEA: 0
COUGH: 1
FEVER: 0
CONSTIPATION: 1
HEMOPTYSIS: 1

## 2025-05-31 LAB
AMPHET CTO UR CFM-MCNC: NEGATIVE NG/ML
BARBITURATES CTO UR CFM-MCNC: NEGATIVE NG/ML
BENZODIAZ CTO UR CFM-MCNC: NEGATIVE NG/ML
BUPRENORPHINE UR-MCNC: NEGATIVE NG/ML
CANNABINOIDS CTO UR CFM-MCNC: NEGATIVE NG/ML
CARISOPRODOL UR-MCNC: NEGATIVE NG/ML
COCAINE CTO UR CFM-MCNC: NEGATIVE NG/ML
CREAT UR-MCNC: 50.6 MG/DL (ref 20–400)
DRUG SCREEN COMMENT UR-IMP: NORMAL
ETHYL GLUCURONIDE UR QL SCN: NEGATIVE NG/ML
FENTANYL UR-MCNC: NEGATIVE NG/ML
MEPERIDINE CTO UR SCN-MCNC: NEGATIVE NG/ML
METHADONE CTO UR CFM-MCNC: NEGATIVE NG/ML
OPIATES UR QL SCN: NEGATIVE NG/ML
OXYCDOXYM URSCRN 2005102: NEGATIVE NG/ML
PCP CTO UR CFM-MCNC: NEGATIVE NG/ML
PROPOXYPH CTO UR CFM-MCNC: NEGATIVE NG/ML
TAPENTADOL UR-MCNC: NEGATIVE NG/ML
TRAMADOL CTO UR SCN-MCNC: NEGATIVE NG/ML
ZOLPIDEM UR-MCNC: NEGATIVE NG/ML

## 2025-06-01 LAB — LPA SERPL-MCNC: 23 MG/DL

## 2025-06-02 ENCOUNTER — PATIENT MESSAGE (OUTPATIENT)
Dept: MEDICAL GROUP | Facility: LAB | Age: 55
End: 2025-06-02
Payer: OTHER GOVERNMENT

## 2025-06-02 DIAGNOSIS — G44.219 EPISODIC TENSION-TYPE HEADACHE, NOT INTRACTABLE: Primary | ICD-10-CM

## 2025-06-02 DIAGNOSIS — G43.001 MIGRAINE WITHOUT AURA AND WITH STATUS MIGRAINOSUS, NOT INTRACTABLE: ICD-10-CM

## 2025-06-03 RX ORDER — BUTALBITAL, ACETAMINOPHEN AND CAFFEINE 50; 325; 40 MG/1; MG/1; MG/1
1 TABLET ORAL EVERY 6 HOURS PRN
Qty: 30 TABLET | Refills: 0 | Status: SHIPPED | OUTPATIENT
Start: 2025-06-03 | End: 2025-09-01

## 2025-06-03 NOTE — PATIENT COMMUNICATION
Received request via: Patient    Was the patient seen in the last year in this department? Yes    Does the patient have an active prescription (recently filled or refills available) for medication(s) requested? No    Pharmacy Name: [facility]    Does the patient have FPC Plus and need 100-day supply? (This applies to ALL medications) Patient does not have SCP

## 2025-06-05 ENCOUNTER — OFFICE VISIT (OUTPATIENT)
Facility: MEDICAL CENTER | Age: 55
End: 2025-06-05
Attending: INTERNAL MEDICINE
Payer: OTHER GOVERNMENT

## 2025-06-05 ENCOUNTER — TELEPHONE (OUTPATIENT)
Dept: VASCULAR LAB | Facility: MEDICAL CENTER | Age: 55
End: 2025-06-05

## 2025-06-05 VITALS
HEIGHT: 66 IN | BODY MASS INDEX: 21.38 KG/M2 | WEIGHT: 133 LBS | SYSTOLIC BLOOD PRESSURE: 137 MMHG | DIASTOLIC BLOOD PRESSURE: 89 MMHG | HEART RATE: 74 BPM

## 2025-06-05 DIAGNOSIS — E78.00 HYPERCHOLESTEROLEMIA WITH LDL GREATER THAN 190 MG/DL: ICD-10-CM

## 2025-06-05 DIAGNOSIS — E78.01 FAMILIAL HYPERCHOLESTEREMIA: Primary | ICD-10-CM

## 2025-06-05 DIAGNOSIS — I10 ESSENTIAL HYPERTENSION: ICD-10-CM

## 2025-06-05 PROCEDURE — 99212 OFFICE O/P EST SF 10 MIN: CPT

## 2025-06-05 PROCEDURE — RXMED WILLOW AMBULATORY MEDICATION CHARGE: Performed by: INTERNAL MEDICINE

## 2025-06-05 PROCEDURE — 99214 OFFICE O/P EST MOD 30 MIN: CPT | Performed by: INTERNAL MEDICINE

## 2025-06-05 PROCEDURE — 3075F SYST BP GE 130 - 139MM HG: CPT | Performed by: INTERNAL MEDICINE

## 2025-06-05 PROCEDURE — 3079F DIAST BP 80-89 MM HG: CPT | Performed by: INTERNAL MEDICINE

## 2025-06-05 RX ORDER — SPIRONOLACTONE 25 MG/1
12.5 TABLET ORAL DAILY
Qty: 15 TABLET | Refills: 3 | Status: SHIPPED | OUTPATIENT
Start: 2025-06-05

## 2025-06-05 RX ORDER — ROSUVASTATIN CALCIUM 40 MG/1
20 TABLET, COATED ORAL DAILY
Qty: 90 TABLET | Refills: 1
Start: 2025-06-05

## 2025-06-05 ASSESSMENT — FIBROSIS 4 INDEX: FIB4 SCORE: 1.02

## 2025-06-05 NOTE — TELEPHONE ENCOUNTER
Contact:  Phone number:[phone] (mobile)    Name of person spoken with and relationship to patient: alireza Sterling   Patient’s Adherence:  How patient is doing on medication: N/A-New Start    How many missed doses and reason: 0 N/A    Any new medications: N/A, new start    Any new conditions: N/A, new start    Any new allergies: N/A, new start    Any new side effects: N/A, new start    Any new diagnoses: N/A, new start    How many doses remainin    Did patient want to speak with pharmacist: No   Delivery:  Delivery date and method: 6/10 via [employer] Overnight    Needs by Date: 6/10    Signature required: No     Any additional details for : N/A   Teach Appointment Date:  N/A   Shipping Address:  [address] [place] [id number]   Medication(name,strength and dose):  Repatha SureClick 140mg/mL SOAJ   Copay:  $15   Payment Method:  Credit card on file   Supplies:  None at this time   Additional Information:  N/A     Thank you,  Winnie Duncan  Pharmacy Liaison  [facility]  [phone]  2025 4:38 PM

## 2025-06-05 NOTE — TELEPHONE ENCOUNTER
Received New Start request via [employer]  for REPATHA SURECLICK 140MG/ML SOLUTION AUTO INJECTOR. (Quantity:2 mls, Day Supply:28)     Insurance: [employer]   Member ID:  [id number]  BIN: [id number]  PCN: [id number]  Group: [id number]     Ran Test claim via [employer] [facility] Pays for a $43 copay. Will outreach to patient to offer specialty pharmacy services and or release to preferred pharmacy    HERLINDA Hurley, PhT  Vascular Pharmacy Liaison (Rx Coordinator)  P: [phone]  6/5/2025 3:25 PM

## 2025-06-05 NOTE — PROGRESS NOTES
[facility] - Follow up Visit  Date of Service: 06/05/25    Here for vascular follow up - cholesterol and BP    Subjective      HPI  History of ASCVD: No  Other Established (non-atherosclerotic) Vascular Disease, if Present: None  Age at Initial Diagnosis of Dyslipidemia: early 30s    Current Prescription Lipid Lowering Medications - including dose:   Statin: Rosuvastatin 40 mg daily  Non-Statin: None  Current Lipid Lowering and Related Supplements:   none  Any Current Side Effects Potentially Related to Lipid Lowering therapy?   No  Current Adherence to Lipid Lowering Therapies   Completely non-adherent  Previously Attempted Interventions for Lipids - including outcome  Statin: None    Outcome: N/A  Non-Statin: ezetimibe   Outcome: tolerated well  Any Previous History of Statin Intolerance?   No  Baseline Lipids Prior to Treatment:   Shown here:  LDL-C: 298  HDL-C: 58  Trigs: 140 Date: June 2020  Other Pertinent History:   She continues to follow-up with psychiatry  Her medications are unchanged  Still on relatively new HRT patch and feels better on it  She lost quite a bit of weight with a GLP-1.  She is put back on a few pounds since stopping it.  She tried some metformin but has GI upset  History of other CV risk factors:   -HTN -she does not check her blood pressure at home because it makes her anxious.  She remains on losartan and propranolol.    FAMILY HISTORY:   Mom - cholesterol over 300  2 brothers - both with MI in 40s    SOCIAL HISTORY   Social History     Tobacco Use   Smoking Status Former    Types: Cigarettes   Smokeless Tobacco Never   Tobacco Comments    former light smoker; quit when brother had bypass     Change in weight: Lost about 30 pounds - about 5 lb back since off GLP1 in feb  Exercise habits: moderate regular exercise program  Diet: relatively heart healthy        Objective      Vitals:    06/05/25 1111 06/05/25 1115   BP: (!) 146/84 137/89   BP Location: Left arm Left arm  "  Patient Position: Sitting Sitting   BP Cuff Size: Adult Adult   Pulse: 73 74   Weight: 60.3 kg (133 lb)    Height: 1.676 m (5' 6\")      BP Readings from Last 5 Encounters:   06/05/25 137/89   05/29/25 (!) 142/88   02/25/25 (!) 162/82   02/14/25 (!) 140/80   12/06/24 (!) 138/98       Physical Exam  Vitals reviewed.   Constitutional:       General: She is not in acute distress.     Appearance: She is not diaphoretic.   HENT:      Head: Normocephalic and atraumatic.   Eyes:      General: No scleral icterus.     Conjunctiva/sclera: Conjunctivae normal.   Neck:      Comments: No carotid bruits  Cardiovascular:      Rate and Rhythm: Normal rate and regular rhythm.      Heart sounds: Normal heart sounds. No murmur heard.  Pulmonary:      Effort: Pulmonary effort is normal. No respiratory distress.      Breath sounds: Normal breath sounds. No wheezing or rales.   Abdominal:      General: There is no distension.      Palpations: Abdomen is soft.   Musculoskeletal:         General: No tenderness.   Skin:     Coloration: Skin is not pale.   Neurological:      Mental Status: She is alert and oriented to person, place, and time.      Cranial Nerves: No cranial nerve deficit.      Coordination: Coordination normal.   Psychiatric:         Behavior: Behavior normal.       DATA REVIEW:  Most Recent Lipid Panel:   Lab Results   Component Value Date    CHOLSTRLTOT 251 (H) 05/30/2025    TRIGLYCERIDE 70 05/30/2025    HDL 61 05/30/2025     (H) 05/30/2025     Lab Results   Component Value Date/Time    LIPOPROTA 23 05/30/2025 07:23 AM    LIPOPROTA 26 07/10/2024 02:00 PM    LIPOPROTA 27 12/12/2023 09:53 AM      No results found for: \"APOB\"   No results found for: \"CRPHIGHSEN\"     Other Pertinent Blood Work:   Lab Results   Component Value Date    SODIUM 139 05/30/2025    POTASSIUM 3.9 05/30/2025    CHLORIDE 106 05/30/2025    CO2 22 05/30/2025    ANION 11.0 05/30/2025    GLUCOSE 87 05/30/2025    BUN 17 05/30/2025    CREATININE 0.88 " 05/30/2025    CALCIUM 9.1 05/30/2025    ASTSGOT 13 05/30/2025    ALTSGPT 8 05/30/2025    ALKPHOSPHAT 58 05/30/2025    TBILIRUBIN 0.5 05/30/2025    ALBUMIN 4.3 05/30/2025    AGRATIO 1.7 05/30/2025    CREACTPROT 0.55 11/08/2023    LIPOPROTA 23 05/30/2025    TSHULTRASEN 3.830 05/30/2025     Cardiovascular Imaging:    CAC score March 2025  LMA - 0.0  LCX - 0.0  LAD - 27.2  RCA - 0.0. There are calcifications that were not detected by the system.  PDA - 0.0  Total Calcium Score: 27.2    Stress echo may 2025  Negative stress echocardiogram for ischemia.  Normal resting left ventricular systolic function.  The left ventricular ejection fraction is visually estimated to be 65%.  Negative ETT.  Excellent exercise tolerance.  Wilbert 10:00, 12.1 METs.  No arrhythmias.      ASSESSMENT AND PLAN  Patient Type, check all that apply:   Primary Prevention  Established Atherosclerotic Cardiovascular Disease (ASCVD)  No  Other Established (non-atherosclerotic) Vascular Disease, if Present:  None  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):   Yes  Based on high LDL-c at young age and fh of premature cad in multiple family members -previously recommended cascade screening  ACC/AHA Indication for Statin Therapy, missael all that apply:  LDL-C at baseline >190 mg/dl: Indication for High intensity statin   Calculated Risk for ASCVD, if applicable    N/A  Other Significant Risk Markers, if any, missael all that apply   Family history of premature ASCVD in first degree relative  Normal lipoprotein a  Favorable HDL, triglycerides, and presumably LDL particle size  Modestly elevated CAC score  Goal LDL-C and nonHDL-C based on Clinic Protocol  LDL-C:   <100 mg/dL -she remains above target despite maximum dose rosuvastatin  Lifestyle Recommendations From Today’s Visit:    Exercise: continue decent exercise habits  Eating Plan: Concentrate on  Low sat/Trans fat  Statin Therapy Recommendations from Today’s Visit:   - Decrease  rosuvastatin to 20 mg daily  Non-Statin Medications Recommendations from Today’s Visit:   FH with suboptimal control of LDL-C despite maximally tolerated statin   - Start Repatha 40 mg every 2 weeks -sent to [facility] pharmacy  Supplements Recommended at this visit:   None currently  Recommendations for Other Cardiovascular Risk Factors, missael all that apply:   - HTN - unclear level of control at home.  Elevated in the office today.   Feels inderal helps mood and HAs so will continue. Continue losartan.  She has not tolerated hydrochlorothiazide or calcium channel blocker in the past.  Will try adding spironolactone 12.5 mg daily.  Consider ABPM in the future  - Overweight -she had significant weight loss with compounded GLP-1.  She has had some weight regain since going off of it.  Metformin has been associate with GI side effects and has really reduced her appetite.  She will continue with diet and exercise for now but we could consider a low-dose of maintenance Mounjaro in the future if she has significant weight regain.  She understands this will likely not be covered by insurance  Other Issues:  - HRT -recently started.  Maybe okay for a few years of her vascular screen does not show significant atherosclerosis.  Will discuss further at next visit    Studies Ordered at Todays Visit: None currently but could consider CIMT and vascular screening following serially in the future  Blood Work Ordered At Today’s visit: Lipid panel, apolipoprotein B, CMP, CRP, urine for albumin prior to next visit  Follow-Up: 8 weeks    Michael J Bloch, M.D.

## 2025-06-05 NOTE — TELEPHONE ENCOUNTER
Medication: Repatha SureClick 140mg/mL SOAJ  Type of Insurance: Commercial  Type of Financial assistance requested Copay Card  Source: [place]  Source Phone #: [phone]  Outcome: APPROVED!  Effective dates: 6/5/25 until 12/31/25  Details/Billing Information:   [id number]  PCN: [id number]  GRP: [id number]  ID: [id number]  Max Award Amount: $2500  Final Copay: $15/28ds    Thank you,  Winnie Duncan  Pharmacy Liaison  [facility]  [phone]  6/5/2025 4:37 PM

## 2025-06-06 ENCOUNTER — HOSPITAL ENCOUNTER (OUTPATIENT)
Dept: CARDIOLOGY | Facility: MEDICAL CENTER | Age: 55
End: 2025-06-06
Attending: INTERNAL MEDICINE
Payer: OTHER GOVERNMENT

## 2025-06-06 ENCOUNTER — DOCUMENTATION (OUTPATIENT)
Dept: PHARMACY | Facility: MEDICAL CENTER | Age: 55
End: 2025-06-06
Payer: OTHER GOVERNMENT

## 2025-06-06 DIAGNOSIS — R06.02 SOB (SHORTNESS OF BREATH): ICD-10-CM

## 2025-06-06 DIAGNOSIS — R07.89 OTHER CHEST PAIN: ICD-10-CM

## 2025-06-06 DIAGNOSIS — E78.01 FAMILIAL HYPERCHOLESTEREMIA: ICD-10-CM

## 2025-06-06 DIAGNOSIS — R94.31 ABNORMAL EKG: ICD-10-CM

## 2025-06-06 LAB
LV EJECT FRACT  99904: 65
LV EJECT FRACT MOD 2C 99903: 64.2
LV EJECT FRACT MOD 4C 99902: 65.88
LV EJECT FRACT MOD BP 99901: 64.64

## 2025-06-06 PROCEDURE — 93306 TTE W/DOPPLER COMPLETE: CPT | Mod: 26 | Performed by: INTERNAL MEDICINE

## 2025-06-06 PROCEDURE — 93306 TTE W/DOPPLER COMPLETE: CPT

## 2025-06-06 NOTE — PROGRESS NOTES
PHARMACIST CLINICAL ONBOARDING - Clinical Onboarding -   Result = Complete, Next card status: New Start    Therapeutic Category: Hyperlipidemia  ICD10:   Diagnosis Details: Familial hypercholesteremia [E78.01]  Medication(s) associated with Therapeutic Category: Repatha SureClick Solution Auto-injector 140 MG/ML  Medication(s) prescribed for FDA approved indication?   -Repatha SureClick Solution Auto-injector 140 MG/ML: Yes  Full drug therapy: Repatha 140mg/mL SureClick pen injecting 1mL (140mg) under the skin every 14 days + [Rosuvastatin 20mg by mouth daily]   Past treatments: Rosuvastatin, Atorvastatin, Ezetimibe   Goals of therapy: Promote or maintain optimal therapy administration and adherence, Mitigation of side effects, Achieve goal LDL levels (< mg/dL) to reduce risk of cardiovascular events, Implement lifestyle modifications: diet, exercise, weight loss, and/or smoking cessation    Regimen Appropriateness Review: Hyperlipidemia  Any concerning drug and/or non-drug allergies? No  Any concerning drug interactions (drug-drug or dietary)? No  Any concern with prescribed dose (appropriateness, renal, and hepatic adjustments needed)? No  Any comorbidities, past medical history, precautions, or contraindications that pose a medication safety concern? No  Any relevant lab work needed that affects the initial start date? No  Any issues with patient's ability to self-administer medication? No      Patient declined initial clinical pharmacist counseling.

## 2025-06-09 ENCOUNTER — PHARMACY VISIT (OUTPATIENT)
Dept: PHARMACY | Facility: MEDICAL CENTER | Age: 55
End: 2025-06-09
Payer: COMMERCIAL

## 2025-06-17 NOTE — Clinical Note
REFERRAL APPROVAL NOTICE         Sent on June 17, 2025                   Asher Galeas  [address] [address]                   Dear Ms. Jacob Galeas,    After a careful review of the medical information and benefit coverage, [facility] has processed your referral. See below for additional details.    If applicable, you must be actively enrolled with your insurance for coverage of the authorized service. If you have any questions regarding your coverage, please contact your insurance directly.    REFERRAL INFORMATION   Referral #:  [id number]  Referred-To Department    Referred-By Provider:  Pulmonary and Sleep Medicine    Marsha Christianson D.O.   [facility]      [address]  [place] [address]  [phone] [address]  [place] [address]  [phone]    Referral Start Date:  05/29/2025  Referral End Date:   05/29/2026             SCHEDULING  If you do not already have an appointment, please call [phone] to make an appointment.     MORE INFORMATION  If you do not already have a [employer] account, sign up at: [email or web address]  You can access your medical information, make appointments, see lab results, billing information, and more.  If you have questions regarding this referral, please contact  the [facility] Referrals department at:             [phone]. Monday - Friday 8:00AM - 5:00PM.     Sincerely,    [facility]

## 2025-06-26 ENCOUNTER — DOCUMENTATION (OUTPATIENT)
Facility: MEDICAL CENTER | Age: 55
End: 2025-06-26
Payer: OTHER GOVERNMENT

## 2025-06-27 NOTE — PROGRESS NOTES
Received message that patient is not going to start Repatha and stop spironolactone  Will discuss with patient at follow-up visit    Michael Bloch, MD  Vascular Care

## 2025-07-10 ENCOUNTER — OFFICE VISIT (OUTPATIENT)
Dept: BEHAVIORAL HEALTH | Facility: CLINIC | Age: 55
End: 2025-07-10
Payer: OTHER GOVERNMENT

## 2025-07-10 DIAGNOSIS — F41.1 GAD (GENERALIZED ANXIETY DISORDER): Primary | ICD-10-CM

## 2025-07-10 DIAGNOSIS — F33.1 MDD (MAJOR DEPRESSIVE DISORDER), RECURRENT EPISODE, MODERATE (HCC): ICD-10-CM

## 2025-07-10 PROCEDURE — 90834 PSYTX W PT 45 MINUTES: CPT

## 2025-07-10 NOTE — PROGRESS NOTES
[facility]   Initial Assessment      Therapy was provided on this date in coordination with the [facility] approved Clinical Supervisor under the direct supervision of  who was on site during this visit.    Therapist reviewed informed consent, limits of confidentiality and [facility] policies; patient expressed understanding and agreed to voluntarily proceed with evaluation and treatment.    Name: Asher Galeas  MRN: [id number]  : 1970  Age: 54 y.o.  Date of assessment: 7/10/2025  PCP: Marsha Christianson D.O.  Persons in attendance: Patient   Total session time: 50 minutes      CHIEF COMPLAINT AND HISTORY OF PRESENTING PROBLEM:  (as stated by Patient):  Asher Galeas is a 54 y.o., White female referred for assessment by No ref. provider found.  Primary presenting issue includes No chief complaint on file.  . Client reports establishing care for help understanding and dx of ADHD or ASD 1. Client reports researching into ASD and resonating with ADS dx.       BEHAVIORAL HEALTH TREATMENT HISTORY  Does patient/parent report a history of prior behavioral health treatment for patient? Client reports current medication management with   History of untreated behavioral health issues identified? No  Does patient/parent report change in appetite or weight loss/gain? No  Does patient/parent report physical pain? No                     FAMILY/SOCIAL HISTORY  Current living situation/household members: Client reports currently living with her second . Client reports she has a great support from him. Client reports they have been together for 13 years. Client reports distant relationship with her 5 siblings, client expressed they will talk occasionally.    Does patient/parent report a family history of behavioral health issues, diagnoses, or treatment?   Family History   Problem Relation Age of Onset    Hyperlipidemia Mother     Heart  "Disease Sister     Hypertension Sister     Hyperlipidemia Sister     Heart Attack Brother     Hyperlipidemia Brother     Heart Disease Brother     Hypertension Brother     Cancer Neg Hx     Diabetes Neg Hx     Stroke Neg Hx           EMPLOYMENT/RESOURCES  Is the patient currently employed? No  Does the patient/parent report adequate financial resources? 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               SPIRITUAL/CULTURAL/IDENTITY:  What are the patient's/family's spiritual beliefs or practices? Mona report she is currentyl on a spiritual path.      ABUSE/NEGLECT/TRAUMA SCREENING  Does patient report feeling “unsafe” in his/her home, or afraid of anyone? No  Does patient report any history of physical, sexual, or emotional abuse? No  Is there evidence of neglect by self? No  Is there evidence of neglect by a caregiver? No                                                                                                          SAFETY ASSESSMENT - SELF  Does patient acknowledge current or past symptoms of dangerousness to self? No  Recent change in frequency/specificity/intensity of suicidal thoughts or self-harm behavior? Client reports passive SI, client expressed she will get thoughts of \"what life would be if I wasn't here\" but denies any plan or intent. Client expressed these thoughts do not occur frequently.  Current access to firearms, medications, or other identified means of suicide/self-harm? No  If yes, willing to restrict access to means of suicide/self-harm? Yes      Current Suicide Risk: Low  Crisis Safety Plan completed and copy given to patient: No,The patient was educated to call 911, call the suicide hotline, or go to local ER if having thoughts of suicide or homicide, client verbalized understanding.      SAFETY ASSESSMENT - OTHERS  Recent change in frequency/specificity/intensity of thoughts or threats to harm others? " No  If Yes:  Current access to firearms/other identified means of harm?   If yes, willing to restrict access to weapons/means of harm?     Current Homicide Risk:  Not applicable  Crisis Safety Plan completed and copy given to patient? No  Based on information provided during the current assessment, is a mandated “duty to warn” being exercised? No      SUBSTANCE USE/ADDICTION HISTORY  Patient denies use of any substance/addictive behaviors Yes    If No:  Is there a family history of substance use/addiction? Yes  Does patient acknowledge or parent/significant other report use of/dependence on substances? No  Last time patient used alcohol: Occasional use  Within the past week? No  Last time patient used marijuana: N/A  Within the past month? No  Any other street drugs ever tried even once? No  Any use of prescription medications/pills without a prescription, or for reasons others than originally prescribed?  No  Any other addictive behavior reported (gambling, shopping, sex)? No     .         PHQ-9 Screening   Little interest or pleasure in doing things 1 - several days   Feeling down, depressed, or hopeless 1 - several days   Trouble falling or staying asleep, or sleeping too much 3 - nearly every day   Feeling tired or having little energy 1 - several days   Poor appetite or overeating 2 - more than half the days   Feeling bad about yourself - or that you are a failure or have let yourself or your family down 2 - more than half the days   Trouble concentrating on things, such as reading the newspaper or watching television 1 - several days   Moving or speaking so slowly that other people could have noticed. Or the opposite - being so fidgety or restless that you have been moving around a lot more than usual 2 - more than half the days   Thoughts that you would be better off dead, or of hurting yourself in some way 0 - not at all   PHQ-2 Total Score 2   PHQ-9 Total Score 13       Interpretation of PHQ-9 Total Score  "  Score Severity   1-4 No Depression   5-9 Mild Depression   10-14 Moderate Depression   15-19 Moderately Severe Depression   20-27 Severe Depression      ..      BH MICHELLE-7 ANXIETY SCALE FLOWSHEET   Feeling nervous, anxious, or on edge 3   Not being able to stop or control worrying 3   Worrying too much about different things 3   Trouble relaxing 3   Being so restless that it is hard to sit still 2   Becoming easily annoyed or irritable 3   Feeling afraid as if something awful might happen 3   MICHELLE-7 Total Score 20   How difficult have these problems made it for you to do your work, take care of things at home, or get along with other people? Very difficult       Interpretation of MICHELLE-7 Total Score   Score Severity   0-4 Minimal Anxiety  5-9 Mild Anxiety   10-14 Moderate Anxiety  15-21 Severe Anxiety        MENTAL STATUS/OBSERVATIONS              Participation: Active verbal participation, Attentive, and Engaged  Grooming: Casual  Orientation:Alert and Fully Oriented   Behavior: Tense  Eye contact: Good          Mood:Anxious  Affect:Congruent with content  Thought process: Logical  Thought content:  Within normal limits  Speech: Rate within normal limits and Volume within normal limits  Perception: Within normal limits  Memory: No gross evidence of memory deficits  Insight: Adequate  Judgment:  Adequate  Other:               Family/couple interaction observations:       Patient's motivation/readiness for change: Client reports motivation for therapy is to help understand herself.    Topics addressed in psychotherapy include: Initial assessment and building rapport. Client and therapist discussed ASD dx in-depth. Client reports feeling as she has always been \"neuro divergent\" but has been able to understand it and see it more due to hx of behaviors and patterns. Client and therapist discussed ASD, client reports she has difficulty in maintaining and developing relationships, insistence on having a fixed routine and " patterns, and catastrophic thinking. Client and therapist discussed behaviors, client reports she has a hard time navigating on being close and intimate with her  or maintaining friendships, client expressed feeling as she will have to force herself to do things out of responsibility for her . Client reports hx of past behavior of when she was younger such as always needing to a follow a routine and becoming hyper focused on activities         Diagnosis:  1. MICHELLE (generalized anxiety disorder)    2. MDD (major depressive disorder), recurrent episode, moderate (HCC)          Referral appointment(s) scheduled? Yes       Adelia Bonilla LMSW, CSW-Intern

## 2025-07-16 ENCOUNTER — OFFICE VISIT (OUTPATIENT)
Dept: BEHAVIORAL HEALTH | Facility: CLINIC | Age: 55
End: 2025-07-16
Payer: OTHER GOVERNMENT

## 2025-07-16 DIAGNOSIS — Z01.89 ASSESSMENT OF EFFECTS OF PSYCHOTROPIC DRUG IN PATIENT AT RISK FOR METABOLIC SYNDROME: Primary | ICD-10-CM

## 2025-07-16 DIAGNOSIS — Z91.89 ASSESSMENT OF EFFECTS OF PSYCHOTROPIC DRUG IN PATIENT AT RISK FOR METABOLIC SYNDROME: Primary | ICD-10-CM

## 2025-07-16 DIAGNOSIS — G47.00 INSOMNIA, UNSPECIFIED TYPE: ICD-10-CM

## 2025-07-16 DIAGNOSIS — F41.1 GAD (GENERALIZED ANXIETY DISORDER): ICD-10-CM

## 2025-07-16 DIAGNOSIS — F39 MOOD DISORDER (HCC): ICD-10-CM

## 2025-07-16 DIAGNOSIS — Z79.899 ASSESSMENT OF EFFECTS OF PSYCHOTROPIC DRUG IN PATIENT AT RISK FOR METABOLIC SYNDROME: Primary | ICD-10-CM

## 2025-07-16 PROCEDURE — 99214 OFFICE O/P EST MOD 30 MIN: CPT | Performed by: PSYCHIATRY & NEUROLOGY

## 2025-07-16 RX ORDER — QUETIAPINE FUMARATE 25 MG/1
TABLET, FILM COATED ORAL
Qty: 90 TABLET | Refills: 1 | Status: SHIPPED | OUTPATIENT
Start: 2025-07-16

## 2025-07-16 RX ORDER — METFORMIN HYDROCHLORIDE 500 MG/1
1000 TABLET, EXTENDED RELEASE ORAL DAILY
Qty: 200 TABLET | Refills: 1 | Status: SHIPPED | OUTPATIENT
Start: 2025-07-16 | End: 2025-10-24

## 2025-07-16 NOTE — PROGRESS NOTES
PSYCHIATRY FOLLOW-UP NOTE      Name: Asher Galeas  MRN: [id number]  : 1970  Age: 54 y.o.  Date of assessment: 2025  PCP: Marsha Christianson D.O.  Persons in attendance: Patient      REASON FOR VISIT/CHIEF COMPLAINT (as stated by Patient):  Asher Galeas is a 54 y.o., White female, attending follow-up appointment for mood and anxiety management.      HISTORY OF PRESENT ILLNESS:  Asher Galeas is a 54 y.o. old female with MICHELLE and insomnia comes in today for follow up. Patient was last seen 2 months ago, and following treatment planning recommendations were done:  Continue Seroquel 12.5-25 mg HS PRN for sleep.  Continue Metformin  mg daily  Stop Kratom use  Referral placed to sleep medicine for evaluation of chronic insomnia.    History of Present Illness    She has been experiencing weight gain, with a recent increase of 6 pounds.   She met with Dr. Lawson, a cardiologist, about a month ago. He inquired about her eating habits and exercise routine. She mentioned that she had lost 30 pounds, but Dr. Lawson expressed a desire for her to maintain the weight loss. She explained that Seroquel increases her appetite, making it challenging to keep the weight off. Dr. Lawson suggested considering microdosing GLP-1 as a potential solution.   She discontinued her GLP-1 medication in 2025 and has since gained 6 pounds. Dr. Lawson suggested microdosing GLP-1 as her good cholesterol levels had improved while on this medication. She found a provider online who approved her for microdosing semaglutide, which she started last week.   She is also taking metformin, which occasionally causes stomach upset but helps with bowel movements. She stopped metformin for a few days last week but resumed it due to constipation. Agreed with increasing Metformin to help with weight gain from seroquel.        CURRENT MEDICATIONS:  Current Medications[1]    MEDICAL HISTORY  Past  Medical History[2]  Past Surgical History[3]      PAST PSYCHIATRIC MEDICATIONS  Paxil, Zoloft  Effexor, Cymbalta  Mirtazapine  Lamictal  Trazodone, Ambien, Lunesta, Seroquel, Amitriptyline. Clonidine  Buspar, Hydroxyzine, Xanax, Ativan, Valium      REVIEW OF SYSTEMS:        Constitutional negative   Eyes negative   Ears/Nose/Mouth/Throat negative   Cardiovascular  HTN   Respiratory negative   Gastrointestinal negative   Genitourinary negative   Muscular negative   Integumentary negative   Neurological negative   Endocrine negative   Hematologic/Lymphatic negative     PHYSICAL EXAMINAION:  Vital signs: There were no vitals taken for this visit.  Musculoskeletal: Normal gait.   Abnormal movements: none      MENTAL STATUS EXAMINATION      General:   - Grooming and hygiene: Casual,   - Apparent distress: none,   - Behavior: Calm  - Eye Contact:  Good,   - no psychomotor agitation or retardation    - Participation: Active verbal participation  Orientation: Alert and Fully Oriented to person, place and time  Mood: Euthymic  Affect: Flexible and Full range,  Thought Process: Logical and Goal-directed  Thought Content: Denies suicidal or homicidal ideations, intent or plan   Perception: Denies auditory or visual hallucinations. No delusions noted   Attention span and concentration: Intact   Speech:Rate within normal limits and Volume within normal limits  Language: Appropriate   Insight: Good  Judgment: Good  Recent and remote memory: No gross evidence of memory deficits        DEPRESSION SCREENIN/25/2023     4:00 PM 7/10/2024     1:00 PM 2025     2:00 PM   Depression Screen (PHQ-2/PHQ-9)   PHQ-2 Total Score 0 0 2   PHQ-9 Total Score   13       Interpretation of PHQ-9 Total Score   Score Severity   1-4 No Depression   5-9 Mild Depression   10-14 Moderate Depression   15-19 Moderately Severe Depression   20-27 Severe Depression    CURRENT RISK:       Suicidal: Low       Homicidal: Low       Self-Harm: Low        Relapse: Low       Crisis Safety Plan Reviewed Not Indicated       If evidence of imminent risk is present, intervention/plan:      MEDICAL RECORDS/LABS/DIAGNOSTIC TESTS REVIEWED:  No new lab since last visit     [place]  records -   Reviewed     (1) MICHELLE; (2) Insomnia  improving  Continue Seroquel 12.5-25 mg HS PRN for sleep.  Increase Metformin XR 1000 mg daily  Stop Kratom use  Referral placed to sleep medicine for evaluation of chronic insomnia.  Medication options, alternatives (including no medications) and medication risks/benefits/side effects were discussed in detail.  Explained importance of contraceptive measures while on psychotropic medications, educated to let provider know if ever pregnant or wanting to become pregnant. Verbalized understanding.  The patient was advised to call, message provider on [facility], or come in to the clinic if symptoms worsen or if any future questions/issues regarding their medications arise; the patient verbalized understanding and agreement.    The patient was educated to call 911, call the suicide hotline, or go to local ER if having thoughts of suicide or homicide; verbalized understanding.      Billing Coding based on:  [id number] based on MDM    Return to clinic in 2-3 months or sooner if symptoms worsen.  Next Appointment: instruction provided on how to make the next appointment.     The proposed treatment plan was discussed with the patient who was provided the opportunity to ask questions and make suggestions regarding alternative treatment. Patient verbalized understanding and expressed agreement with the plan.       Shree Plata M.D.  07/16/25    This note was created using voice recognition software (Dragon). The accuracy of the dictation is limited by the abilities of the software. I have reviewed the note prior to signing, however some errors in grammar and context are still possible. If you have any questions related to this note please do not hesitate to contact  our office.            [1]   Current Outpatient Medications   Medication Sig Dispense Refill    spironolactone (ALDACTONE) 25 MG Tab Take 0.5 Tablets by mouth every day. 15 Tablet 3    Evolocumab (REPATHA) 140 MG/ML Solution Auto-injector SubQ injection pen Inject 1 mL under the skin every 14 days. 2.1 Each 11    rosuvastatin (CRESTOR) 40 MG tablet Take 0.5 Tablets by mouth every day. 90 Tablet 1    butalbital/apap/caffeine (FIORICET) -40 MG Tab Take 1 Tablet by mouth every 6 hours as needed for Migraine for up to 90 days. 30 Tablet 0    zolpidem (AMBIEN) 5 MG Tab Take 5 mg by mouth at bedtime as needed for Sleep (travel). (Patient not taking: Reported on 6/5/2025)      QUEtiapine (SEROQUEL) 25 MG Tab Take 1 to 2 tabs at bedtime as needed for sleep (Patient taking differently: 25-50 mg. Take 1 to 2 tabs at bedtime as needed for sleep) 180 Tablet 1    amitriptyline (ELAVIL) 50 MG Tab TAKE 1 TABLET BY MOUTH EVERY EVENING FOR HEADACHE PREVENTION 90 Tablet 1    losartan (COZAAR) 100 MG Tab TAKE 1 TABLET BY MOUTH EVERY DAY 90 Tablet 3    propranolol CR (INDERAL LA) 120 MG CAPSULE SR 24 HR TAKE 1 CAPSULE BY MOUTH EVERY DAY 90 Capsule 3    Levonorgestrel (MIRENA, 52 MG, IU) by Intrauterine route.      estradiol (WILFRIDO) 0.05 MG/24HR PATCH BIWEEKLY        No current facility-administered medications for this visit.   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

## 2025-07-23 ENCOUNTER — OFFICE VISIT (OUTPATIENT)
Dept: BEHAVIORAL HEALTH | Facility: CLINIC | Age: 55
End: 2025-07-23
Payer: OTHER GOVERNMENT

## 2025-07-23 DIAGNOSIS — F41.1 GAD (GENERALIZED ANXIETY DISORDER): Primary | ICD-10-CM

## 2025-07-23 DIAGNOSIS — F33.1 MDD (MAJOR DEPRESSIVE DISORDER), RECURRENT EPISODE, MODERATE (HCC): ICD-10-CM

## 2025-07-23 PROCEDURE — 90837 PSYTX W PT 60 MINUTES: CPT

## 2025-07-23 NOTE — PROGRESS NOTES
[facility] Progress Note      Therapy was provided on this date in coordination with the [facility] approved Clinical Supervisor under the direct supervision of  who was on site during this visit.    Therapist reviewed informed consent, limits of confidentiality and [facility] policies; patient expressed understanding and agreed to voluntarily proceed with evaluation and treatment.    Name: Asher Galeas  MRN: [id number]  : 1970  Age: 54 y.o.  Date of assessment: 2025  PCP: Marsha Christianson D.O.  Persons in attendance: Patient   Total session time: 60 minutes      Topics addressed in psychotherapy include:     Data: Client reports reflecting on behaviors from the past she now understands as ASD. Client expressed things she struggles with the most is relationships especially intimate relationships. Client reports wanting her  to understand clients feelings towards being intimate.     Assessment: client used session for emotional decompression. Client and therapist discussed situation in-depth. Client and therapist discussed behaviors client reflected on, client expressed in the past behaviors such as repetitive behaviors to help soothe herself (rocking herself whenever), feeling the need to constantly mask interests or things she likes due to not wanting to be “rejected from others.” Client and therapist explored relationships, client expressed she always struggled maintaining friendships and romantic relationships due to being blunt and difficulty in “having to be intimate.” Client and therapist discussed relationships, client expressed she will often feel the need to do things for her partner or friendships because “it’s what they need.”     Plan: Therapist offered supportive psychotherapy. Plan of care is CBT.    Objective Observations:   Participation:Active verbal participation, Attentive, and  Engaged   Grooming:Casual   Cognition:Alert and Fully Oriented   Eye Contact:Good   Mood:Anxious   Affect:Congruent with content   Thought Process:Logical   Speech:Rate within normal limits    Current Risk:   Suicide:  Not indicated   Homicide:  Not indicated   Self-Harm:  Not indicated    Relapse:  Not indicated    Safety Plan Reviewed: not applicable        Diagnosis:  1. MICHELLE (generalized anxiety disorder)    2. MDD (major depressive disorder), recurrent episode, moderate (HCC)            Adelia Bonilla LMSW, CSW-Intern

## 2025-08-05 ENCOUNTER — TELEPHONE (OUTPATIENT)
Dept: VASCULAR LAB | Facility: MEDICAL CENTER | Age: 55
End: 2025-08-05
Payer: OTHER GOVERNMENT

## 2025-08-07 ENCOUNTER — OFFICE VISIT (OUTPATIENT)
Dept: URGENT CARE | Facility: PHYSICIAN GROUP | Age: 55
End: 2025-08-07
Payer: OTHER GOVERNMENT

## 2025-08-07 VITALS
RESPIRATION RATE: 20 BRPM | OXYGEN SATURATION: 99 % | HEIGHT: 66 IN | HEART RATE: 84 BPM | TEMPERATURE: 98.3 F | BODY MASS INDEX: 21.38 KG/M2 | DIASTOLIC BLOOD PRESSURE: 110 MMHG | SYSTOLIC BLOOD PRESSURE: 180 MMHG | WEIGHT: 133 LBS

## 2025-08-07 DIAGNOSIS — F41.0 PANIC ATTACK: ICD-10-CM

## 2025-08-07 DIAGNOSIS — J01.90 ACUTE NON-RECURRENT SINUSITIS, UNSPECIFIED LOCATION: ICD-10-CM

## 2025-08-07 DIAGNOSIS — F41.1 GAD (GENERALIZED ANXIETY DISORDER): Primary | ICD-10-CM

## 2025-08-07 PROCEDURE — 3080F DIAST BP >= 90 MM HG: CPT | Performed by: NURSE PRACTITIONER

## 2025-08-07 PROCEDURE — 3077F SYST BP >= 140 MM HG: CPT | Performed by: NURSE PRACTITIONER

## 2025-08-07 PROCEDURE — 99214 OFFICE O/P EST MOD 30 MIN: CPT | Performed by: NURSE PRACTITIONER

## 2025-08-07 RX ORDER — PREDNISONE 20 MG/1
20 TABLET ORAL 2 TIMES DAILY
Qty: 10 TABLET | Refills: 0 | Status: SHIPPED | OUTPATIENT
Start: 2025-08-07 | End: 2025-08-12

## 2025-08-07 RX ORDER — PREDNISONE 20 MG/1
20 TABLET ORAL DAILY
Qty: 5 TABLET | Refills: 0 | Status: SHIPPED | OUTPATIENT
Start: 2025-08-07 | End: 2025-08-07

## 2025-08-07 RX ORDER — ALPRAZOLAM 0.25 MG
0.25 TABLET ORAL EVERY 6 HOURS PRN
Qty: 20 TABLET | Refills: 0 | Status: SHIPPED | OUTPATIENT
Start: 2025-08-07 | End: 2025-08-07

## 2025-08-07 RX ORDER — ALPRAZOLAM 0.25 MG
0.25 TABLET ORAL EVERY 6 HOURS PRN
Qty: 20 TABLET | Refills: 0 | Status: SHIPPED | OUTPATIENT
Start: 2025-08-07 | End: 2025-08-12

## 2025-08-07 ASSESSMENT — ENCOUNTER SYMPTOMS
SINUS PAIN: 1
RESPIRATORY NEGATIVE: 1
NERVOUS/ANXIOUS: 1
SHORTNESS OF BREATH: 0
CONSTITUTIONAL NEGATIVE: 1

## 2025-08-07 ASSESSMENT — VISUAL ACUITY: OU: 1

## 2025-08-07 ASSESSMENT — FIBROSIS 4 INDEX: FIB4 SCORE: 1.02

## 2025-08-12 ENCOUNTER — APPOINTMENT (OUTPATIENT)
Facility: MEDICAL CENTER | Age: 55
End: 2025-08-12
Attending: INTERNAL MEDICINE
Payer: OTHER GOVERNMENT

## 2025-08-20 ENCOUNTER — APPOINTMENT (OUTPATIENT)
Dept: BEHAVIORAL HEALTH | Facility: CLINIC | Age: 55
End: 2025-08-20
Payer: OTHER GOVERNMENT